# Patient Record
Sex: FEMALE | Race: WHITE | NOT HISPANIC OR LATINO | Employment: OTHER | URBAN - METROPOLITAN AREA
[De-identification: names, ages, dates, MRNs, and addresses within clinical notes are randomized per-mention and may not be internally consistent; named-entity substitution may affect disease eponyms.]

---

## 2020-02-22 ENCOUNTER — HOSPITAL ENCOUNTER (EMERGENCY)
Facility: HOSPITAL | Age: 85
Discharge: HOME | End: 2020-02-22
Attending: EMERGENCY MEDICINE | Admitting: EMERGENCY MEDICINE
Payer: MEDICARE

## 2020-02-22 ENCOUNTER — APPOINTMENT (EMERGENCY)
Dept: RADIOLOGY | Facility: HOSPITAL | Age: 85
End: 2020-02-22
Payer: MEDICARE

## 2020-02-22 VITALS
RESPIRATION RATE: 18 BRPM | DIASTOLIC BLOOD PRESSURE: 70 MMHG | BODY MASS INDEX: 28.25 KG/M2 | TEMPERATURE: 97.4 F | HEART RATE: 55 BPM | HEIGHT: 67 IN | WEIGHT: 180 LBS | OXYGEN SATURATION: 97 % | SYSTOLIC BLOOD PRESSURE: 154 MMHG

## 2020-02-22 DIAGNOSIS — T14.8XXA ABRASION: ICD-10-CM

## 2020-02-22 DIAGNOSIS — S02.31XA FRACTURE OF RIGHT ORBITAL FLOOR (HCC): Primary | ICD-10-CM

## 2020-02-22 LAB
ALBUMIN SERPL BCP-MCNC: 3.7 G/DL (ref 3.5–5)
ALP SERPL-CCNC: 97 U/L (ref 46–116)
ALT SERPL W P-5'-P-CCNC: 25 U/L (ref 12–78)
ANION GAP SERPL CALCULATED.3IONS-SCNC: 6 MMOL/L (ref 4–13)
AST SERPL W P-5'-P-CCNC: 25 U/L (ref 5–45)
ATRIAL RATE: 57 BPM
ATRIAL RATE: 61 BPM
BASOPHILS # BLD AUTO: 0.06 THOUSANDS/ΜL (ref 0–0.1)
BASOPHILS NFR BLD AUTO: 1 % (ref 0–1)
BILIRUB SERPL-MCNC: 0.4 MG/DL (ref 0.2–1)
BUN SERPL-MCNC: 28 MG/DL (ref 5–25)
CALCIUM SERPL-MCNC: 10.1 MG/DL (ref 8.3–10.1)
CHLORIDE SERPL-SCNC: 103 MMOL/L (ref 100–108)
CO2 SERPL-SCNC: 25 MMOL/L (ref 21–32)
CREAT SERPL-MCNC: 1.19 MG/DL (ref 0.6–1.3)
EOSINOPHIL # BLD AUTO: 0.39 THOUSAND/ΜL (ref 0–0.61)
EOSINOPHIL NFR BLD AUTO: 4 % (ref 0–6)
ERYTHROCYTE [DISTWIDTH] IN BLOOD BY AUTOMATED COUNT: 13.5 % (ref 11.6–15.1)
GFR SERPL CREATININE-BSD FRML MDRD: 39 ML/MIN/1.73SQ M
GLUCOSE SERPL-MCNC: 108 MG/DL (ref 65–140)
HCT VFR BLD AUTO: 41.7 % (ref 34.8–46.1)
HGB BLD-MCNC: 14 G/DL (ref 11.5–15.4)
IMM GRANULOCYTES # BLD AUTO: 0.04 THOUSAND/UL (ref 0–0.2)
IMM GRANULOCYTES NFR BLD AUTO: 0 % (ref 0–2)
INR PPP: 1.03 (ref 0.84–1.19)
LYMPHOCYTES # BLD AUTO: 1.63 THOUSANDS/ΜL (ref 0.6–4.47)
LYMPHOCYTES NFR BLD AUTO: 16 % (ref 14–44)
MCH RBC QN AUTO: 31.3 PG (ref 26.8–34.3)
MCHC RBC AUTO-ENTMCNC: 33.6 G/DL (ref 31.4–37.4)
MCV RBC AUTO: 93 FL (ref 82–98)
MONOCYTES # BLD AUTO: 0.71 THOUSAND/ΜL (ref 0.17–1.22)
MONOCYTES NFR BLD AUTO: 7 % (ref 4–12)
NEUTROPHILS # BLD AUTO: 7.17 THOUSANDS/ΜL (ref 1.85–7.62)
NEUTS SEG NFR BLD AUTO: 72 % (ref 43–75)
NRBC BLD AUTO-RTO: 0 /100 WBCS
P AXIS: 50 DEGREES
P AXIS: 51 DEGREES
PLATELET # BLD AUTO: 218 THOUSANDS/UL (ref 149–390)
PMV BLD AUTO: 9.6 FL (ref 8.9–12.7)
POTASSIUM SERPL-SCNC: 4.5 MMOL/L (ref 3.5–5.3)
PR INTERVAL: 180 MS
PR INTERVAL: 182 MS
PROT SERPL-MCNC: 7 G/DL (ref 6.4–8.2)
PROTHROMBIN TIME: 13.1 SECONDS (ref 11.6–14.5)
QRS AXIS: -33 DEGREES
QRS AXIS: -33 DEGREES
QRSD INTERVAL: 130 MS
QRSD INTERVAL: 136 MS
QT INTERVAL: 428 MS
QT INTERVAL: 456 MS
QTC INTERVAL: 430 MS
QTC INTERVAL: 443 MS
RBC # BLD AUTO: 4.47 MILLION/UL (ref 3.81–5.12)
SODIUM SERPL-SCNC: 134 MMOL/L (ref 136–145)
T WAVE AXIS: 52 DEGREES
T WAVE AXIS: 54 DEGREES
VENTRICULAR RATE: 57 BPM
VENTRICULAR RATE: 61 BPM
WBC # BLD AUTO: 10 THOUSAND/UL (ref 4.31–10.16)

## 2020-02-22 PROCEDURE — 85025 COMPLETE CBC W/AUTO DIFF WBC: CPT | Performed by: EMERGENCY MEDICINE

## 2020-02-22 PROCEDURE — 80053 COMPREHEN METABOLIC PANEL: CPT | Performed by: EMERGENCY MEDICINE

## 2020-02-22 PROCEDURE — 70450 CT HEAD/BRAIN W/O DYE: CPT

## 2020-02-22 PROCEDURE — 72125 CT NECK SPINE W/O DYE: CPT

## 2020-02-22 PROCEDURE — 90715 TDAP VACCINE 7 YRS/> IM: CPT | Performed by: EMERGENCY MEDICINE

## 2020-02-22 PROCEDURE — 90471 IMMUNIZATION ADMIN: CPT

## 2020-02-22 PROCEDURE — 99284 EMERGENCY DEPT VISIT MOD MDM: CPT | Performed by: SURGERY

## 2020-02-22 PROCEDURE — 70486 CT MAXILLOFACIAL W/O DYE: CPT

## 2020-02-22 PROCEDURE — 85610 PROTHROMBIN TIME: CPT | Performed by: EMERGENCY MEDICINE

## 2020-02-22 PROCEDURE — 36415 COLL VENOUS BLD VENIPUNCTURE: CPT | Performed by: EMERGENCY MEDICINE

## 2020-02-22 PROCEDURE — 99285 EMERGENCY DEPT VISIT HI MDM: CPT

## 2020-02-22 PROCEDURE — 99284 EMERGENCY DEPT VISIT MOD MDM: CPT | Performed by: EMERGENCY MEDICINE

## 2020-02-22 PROCEDURE — NC001 PR NO CHARGE: Performed by: EMERGENCY MEDICINE

## 2020-02-22 PROCEDURE — 93005 ELECTROCARDIOGRAM TRACING: CPT

## 2020-02-22 PROCEDURE — 93010 ELECTROCARDIOGRAM REPORT: CPT | Performed by: INTERNAL MEDICINE

## 2020-02-22 PROCEDURE — NC001 PR NO CHARGE: Performed by: SURGERY

## 2020-02-22 RX ORDER — METOPROLOL TARTRATE 50 MG/1
50 TABLET, FILM COATED ORAL EVERY 12 HOURS SCHEDULED
COMMUNITY
End: 2021-02-05

## 2020-02-22 RX ORDER — ACETAMINOPHEN 325 MG/1
975 TABLET ORAL ONCE
Status: COMPLETED | OUTPATIENT
Start: 2020-02-22 | End: 2020-02-22

## 2020-02-22 RX ORDER — HYDROCHLOROTHIAZIDE 12.5 MG/1
12.5 TABLET ORAL DAILY
COMMUNITY
End: 2021-01-22 | Stop reason: SDUPTHER

## 2020-02-22 RX ORDER — ASPIRIN 81 MG/1
81 TABLET, CHEWABLE ORAL DAILY
COMMUNITY

## 2020-02-22 RX ADMIN — TETANUS TOXOID, REDUCED DIPHTHERIA TOXOID AND ACELLULAR PERTUSSIS VACCINE, ADSORBED 0.5 ML: 5; 2.5; 8; 8; 2.5 SUSPENSION INTRAMUSCULAR at 11:35

## 2020-02-22 RX ADMIN — ACETAMINOPHEN 975 MG: 325 TABLET ORAL at 11:35

## 2020-02-22 NOTE — ED PROVIDER NOTES
H&P Exam - Trauma   Edwin Corado 80 y o  female MRN: [id number]  Unit/Bed#: ED 22/ED 22 Encounter: [id number]    Assessment/Plan   Trauma Alert: Trauma Acuity: C  Model of Arrival: Mode of Arrival: BLS via Trauma Squad Name and Number: Ruchi Rivas [id number] Team: Current Providers  Attending Provider: Trinidad Gasca MD  Resident: Adi Avelar MD  Resident: Lima Bowles MD  Registered Nurse: Kevin Moody RN  ED Technician: Darrin Dakin  Consultants: Trauma Team, Dr Bridget Jefferson Active Problems: Mechanical fall on ASA    Trauma Plan: CT head, neck and facial bones  Consult to trauma team    Chief Complaint:   Chief Complaint   Patient presents with    Fall     Patient fell while taking out garbage with head strike and 81 mg Aspirin  GCS 15, no LOC  Level C trauma       History of Present Illness   HPI:  Edwin Corado is a 80 y o  female who presents status post fall  Patient states that she was taking out the trash when she missed a step and caused her to fall forward  She struck her face  She denies any LOC  She had difficulty getting up and had to call for help  Her only complaint at this time is facial pain  Denies headache dizziness lightheadedness  No visual changes  No neck pain  No back pain  No chest pain shortness breath palpitations  No nausea no vomiting  No abdominal discomfort  Patient takes a daily aspirin  She denies other blood thinners     Mechanism:Details of Incident: Patient states she was taking out the garbage, tripped and fell  Struck right side of her face on cement  Denies LOC  HPI  Review of Systems   Positive Facial pain  Denies headache or dizziness   Denies visual changes  Denies chest pain  Denies shortness of breath  Denies abdominal pain  No nausea no vomiting  No focal numbness weakness or tingling      Historical Information     Immunizations:   Immunization History   Administered Date(s) Administered    Tdap 02/22/2020 Past Medical History:   Diagnosis Date    Hypertension      History reviewed  No pertinent family history  Past Surgical History:   Procedure Laterality Date    BREAST SURGERY       Social History     Socioeconomic History    Marital status:      Spouse name: None    Number of children: None    Years of education: None    Highest education level: None   Occupational History    None   Social Needs    Financial resource strain: None    Food insecurity:     Worry: None     Inability: None    Transportation needs:     Medical: None     Non-medical: None   Tobacco Use    Smoking status: Never Smoker    Smokeless tobacco: Never Used   Substance and Sexual Activity    Alcohol use: Not Currently    Drug use: Never    Sexual activity: None   Lifestyle    Physical activity:     Days per week: None     Minutes per session: None    Stress: None   Relationships    Social connections:     Talks on phone: None     Gets together: None     Attends Zoroastrian service: None     Active member of club or organization: None     Attends meetings of clubs or organizations: None     Relationship status: None    Intimate partner violence:     Fear of current or ex partner: None     Emotionally abused: None     Physically abused: None     Forced sexual activity: None   Other Topics Concern    None   Social History Narrative    None       Family History: noncontribuatory    Meds/Allergies   Prior to Admission Medications   Prescriptions Last Dose Informant Patient Reported?  Taking?   aspirin 81 mg chewable tablet 2/21/2020 at Unknown time  Yes Yes   Sig: Chew 81 mg daily   hydrochlorothiazide (HYDRODIURIL) 12 5 mg tablet 2/21/2020 at Unknown time  Yes Yes   Sig: Take 12 5 mg by mouth daily   metoprolol tartrate (LOPRESSOR) 50 mg tablet 2/22/2020 at Unknown time  Yes Yes   Sig: Take 50 mg by mouth every 12 (twelve) hours      Facility-Administered Medications: None       No Known Allergies    PHYSICAL EXAM    On normocephalic atraumatic  Pupils equal round reactive to light  Extraocular movements intact bilaterally  Ecchymosis to right periorbital region into the maxillary region  Abrasions to nose  Intact mid face  No active epistaxis  No hemotympanum  Neck is supple full range of motion  No midline tenderness to palpation over CT or L-spine  No step-off no deformity  Heart is regular rate  Lungs are clear to auscultation bilaterally  Abdomen is soft positive bowel sounds no rebound or guarding  No lower extremity edema or calf tenderness palpation  Patient does have abrasions over right ulnar aspect of hand as well as bilateral knees  Full range of motion no tenderness to palpation  No swelling  Intact distal pulses  Awake alert oriented appropriate      Objective   Vitals:   First set: Temperature: (!) 97 4 °F (36 3 °C) (02/22/20 1034)  Pulse: 60 (02/22/20 1034)  Respirations: 18 (02/22/20 1034)  Blood Pressure: (!) 185/89 (02/22/20 1034)  SpO2: 97 % (02/22/20 1034)    Primary Survey:   (A) Airway:  Intact  (B) Breathing:  A vicente normal  (C) Circulation: Pulses:   +2 throughout  (D) Disabliity:  GCS 15  (E) Expose:  Ecchymosis to face and abrasions to right hand and bilateral knees    Secondary Survey: (Click on Physical Exam tab above)  Physical Exam    Invasive Devices     Peripheral Intravenous Line            Peripheral IV 02/22/20 Right Forearm less than 1 day                Lab Results:   Results Reviewed     Procedure Component Value Units Date/Time    Comprehensive metabolic panel [[id number]]  (Abnormal) Collected:  02/22/20 1133    Lab Status:  Final result Specimen:  Blood from Arm, Right Updated:  02/22/20 1203     Sodium 134 mmol/L      Potassium 4 5 mmol/L      Chloride 103 mmol/L      CO2 25 mmol/L      ANION GAP 6 mmol/L      BUN 28 mg/dL      Creatinine 1 19 mg/dL      Glucose 108 mg/dL      Calcium 10 1 mg/dL      AST 25 U/L      ALT 25 U/L      Alkaline Phosphatase 97 U/L Total Protein 7 0 g/dL      Albumin 3 7 g/dL      Total Bilirubin 0 40 mg/dL      eGFR 39 ml/min/1 73sq m     Narrative:       National Kid[employer] guidelines for Chronic Kidney Disease (CKD):     Stage 1 with normal or high GFR (GFR > 90 mL/min/1 73 square meters)    Stage 2 Mild CKD (GFR = 60-89 mL/min/1 73 square meters)    Stage 3A Moderate CKD (GFR = 45-59 mL/min/1 73 square meters)    Stage 3B Moderate CKD (GFR = 30-44 mL/min/1 73 square meters)    Stage 4 Severe CKD (GFR = 15-29 mL/min/1 73 square meters)    Stage 5 End Stage CKD (GFR <15 mL/min/1 73 square meters)  Note: GFR calculation is accurate only with a steady state creatinine    Protime-INR [[id number]]  (Normal) Collected:  02/22/20 1133    Lab Status:  Final result Specimen:  Blood from Arm, Right Updated:  02/22/20 1158     Protime 13 1 seconds      INR 1 03    CBC and differential [[id number]] Collected:  02/22/20 1133    Lab Status:  Final result Specimen:  Blood from Arm, Right Updated:  02/22/20 1144     WBC 10 00 Thousand/uL      RBC 4 47 Million/uL      Hemoglobin 14 0 g/dL      Hematocrit 41 7 %      MCV 93 fL      MCH 31 3 pg      MCHC 33 6 g/dL      RDW 13 5 %      MPV 9 6 fL      Platelets 427 Thousands/uL      nRBC 0 /100 WBCs      Neutrophils Relative 72 %      Immat GRANS % 0 %      Lymphocytes Relative 16 %      Monocytes Relative 7 %      Eosinophils Relative 4 %      Basophils Relative 1 %      Neutrophils Absolute 7 17 Thousands/µL      Immature Grans Absolute 0 04 Thousand/uL      Lymphocytes Absolute 1 63 Thousands/µL      Monocytes Absolute 0 71 Thousand/µL      Eosinophils Absolute 0 39 Thousand/µL      Basophils Absolute 0 06 Thousands/µL                  Imaging Studies:   Direct to CT: Yes  CT head wo contrast   Final Result by Sandra Reinoso MD (02/22 1116)      No acute intracranial abnormality          Right inferior orbital wall fracture with periorbital soft tissue swelling and blood products within the right maxillary sinus  Please see report for dedicated facial bone CT  I personally discussed this study with Yahaira Lucas on 2/22/2020 at 11:16 AM                      Workstation performed: [id number]         CT facial bones without contrast   Final Result by Jaclyn Ortega MD (02/22 1116)      Right inferior orbital wall fracture with displacement of fracture fragments into the maxillary sinus  No herniation of extraocular muscle through fracture defect or retrobulbar hematoma  Periorbital soft tissue swelling and blood products within the    right maxillary sinus also present  I personally discussed this study with Yahaira Lucas on 2/22/2020 at 11:16 AM                         Workstation performed: [id number]         CT spine cervical without contrast   Final Result by Jaclyn Ortega MD (02/22 1117)      No cervical spine fracture or traumatic malalignment  Incidental thyroid nodule(s) for which nonemergent thyroid ultrasound is recommended  I personally discussed this study with Kimberley Godoy on 2/22/2020 at 11:16 AM                       Workstation performed: [id number]             Other Studies:  EKG LAD with LVH    Code Status: No Order  Advance Directive and Living Will:      Power of :    POLST:      Procedures  Procedures         ED Course    please refer to at the station note  MDM      Disposition  [facility] Transfer: No  Final diagnoses:   Fracture of right orbital floor [facility])   Abrasion     Time reflects when diagnosis was documented in both MDM as applicable and the Disposition within this note     Time User Action Codes Description Comment    2/22/2020  3:16 PM Dakota Tolbert [S02 31XA] Fracture of right orbital floor ([address] )     2/22/2020  3:16 PM Dakota Tolbert  [address] Abrasion       ED Disposition     ED Disposition Condition Date/Time Comment    Discharge Stable Sat Feb 22, 2020 3:15 PM Go Mendez discharge to home/self care  Follow-up Information     Follow up With Specialties Details Why Contact Info Additional Information    Rj Rahman, BHAKTI Oral Maxillofacial Surgery Schedule an appointment as soon as possible for a visit   [address] [facility], [place] 16       Chuyita Lind MD [address] [facility] [phone]       [facility] [address] Emergency Department Emergency Medicine   [address]  [phone]  ED, [address] [place] [address], [address]   [phone]        Patient's Medications   Discharge Prescriptions    No medications on file     No discharge procedures on file      PDMP Review     None          ED Provider  Electronically Signed by         Pete Hagan MD  02/28/20 0500

## 2020-02-22 NOTE — ED PROVIDER NOTES
History  Chief Complaint   Patient presents with    Fall     Patient fell while taking out garbage with head strike and 81 mg Aspirin  GCS 15, no LOC  Level C trauma     Patient presents for evaluation of fall  Patient tells me that she when out this morning and was taking out her trash and in doing so she tripped over step falling forward and struck her right face against the concrete ground  She had the immediate onset of pain on the right side of her face as well as a fair amount of bleeding from her nose  Denies any loss of consciousness, headache, change in vision, nausea/vomiting  Patient takes a baby aspirin, no other anti-platelet or anti coagulants  Patient was not able to get up on her own and contacted EMS who brought her into the hospital   Patient also complains of some pain in her right palm where she believes she tried to catch herself during her fall  Prior to Admission Medications   Prescriptions Last Dose Informant Patient Reported? Taking?   aspirin 81 mg chewable tablet 2/21/2020 at Unknown time  Yes Yes   Sig: Chew 81 mg daily   hydrochlorothiazide (HYDRODIURIL) 12 5 mg tablet 2/21/2020 at Unknown time  Yes Yes   Sig: Take 12 5 mg by mouth daily   metoprolol tartrate (LOPRESSOR) 50 mg tablet 2/22/2020 at Unknown time  Yes Yes   Sig: Take 50 mg by mouth every 12 (twelve) hours      Facility-Administered Medications: None       Past Medical History:   Diagnosis Date    Hypertension        Past Surgical History:   Procedure Laterality Date    BREAST SURGERY         History reviewed  No pertinent family history  I have reviewed and agree with the history as documented  Social History     Tobacco Use    Smoking status: Never Smoker    Smokeless tobacco: Never Used   Substance Use Topics    Alcohol use: Not Currently    Drug use: Never        Review of Systems   Constitutional: Negative for chills, diaphoresis, fatigue and fever     Respiratory: Negative for cough and shortness and no friction rub  No murmur heard  Pulmonary/Chest: Effort normal and breath sounds normal  No respiratory distress  She has no wheezes  She has no rales  Abdominal: Soft  Bowel sounds are normal  She exhibits no distension and no mass  There is no tenderness  There is no guarding  Musculoskeletal: Normal range of motion  She exhibits tenderness  She exhibits no deformity  Hands:       Legs:  Neurological: She is alert and oriented to person, place, and time  No cranial nerve deficit or sensory deficit  She exhibits normal muscle tone  Coordination normal    Skin: Skin is warm  She is not diaphoretic  Psychiatric: She has a normal mood and affect  Her behavior is normal  Judgment and thought content normal    Vitals reviewed        ED Medications  Medications   acetaminophen (TYLENOL) tablet 975 mg (975 mg Oral Given 2/22/20 1135)   tetanus-diphtheria-acellular pertussis (BOOSTRIX) IM injection 0 5 mL (0 5 mL Intramuscular Given 2/22/20 1135)       Diagnostic Studies  Results Reviewed     Procedure Component Value Units Date/Time    Comprehensive metabolic panel [[id number]]  (Abnormal) Collected:  02/22/20 1133    Lab Status:  Final result Specimen:  Blood from Arm, Right Updated:  02/22/20 1203     Sodium 134 mmol/L      Potassium 4 5 mmol/L      Chloride 103 mmol/L      CO2 25 mmol/L      ANION GAP 6 mmol/L      BUN 28 mg/dL      Creatinine 1 19 mg/dL      Glucose 108 mg/dL      Calcium 10 1 mg/dL      AST 25 U/L      ALT 25 U/L      Alkaline Phosphatase 97 U/L      Total Protein 7 0 g/dL      Albumin 3 7 g/dL      Total Bilirubin 0 40 mg/dL      eGFR 39 ml/min/1 73sq m     Narrative:       [employer] guidelines for Chronic Kidney Disease (CKD):     Stage 1 with normal or high GFR (GFR > 90 mL/min/1 73 square meters)    Stage 2 Mild CKD (GFR = 60-89 mL/min/1 73 square meters)    Stage 3A Moderate CKD (GFR = 45-59 mL/min/1 73 square meters)    Stage 3B Moderate CKD (GFR = 30-44 mL/min/1 73 square meters)    Stage 4 Severe CKD (GFR = 15-29 mL/min/1 73 square meters)    Stage 5 End Stage CKD (GFR <15 mL/min/1 73 square meters)  Note: GFR calculation is accurate only with a steady state creatinine    Protime-INR [[id number]]  (Normal) Collected:  02/22/20 1133    Lab Status:  Final result Specimen:  Blood from Arm, Right Updated:  02/22/20 1158     Protime 13 1 seconds      INR 1 03    CBC and differential [[id number]] Collected:  02/22/20 1133    Lab Status:  Final result Specimen:  Blood from Arm, Right Updated:  02/22/20 1144     WBC 10 00 Thousand/uL      RBC 4 47 Million/uL      Hemoglobin 14 0 g/dL      Hematocrit 41 7 %      MCV 93 fL      MCH 31 3 pg      MCHC 33 6 g/dL      RDW 13 5 %      MPV 9 6 fL      Platelets 408 Thousands/uL      nRBC 0 /100 WBCs      Neutrophils Relative 72 %      Immat GRANS % 0 %      Lymphocytes Relative 16 %      Monocytes Relative 7 %      Eosinophils Relative 4 %      Basophils Relative 1 %      Neutrophils Absolute 7 17 Thousands/µL      Immature Grans Absolute 0 04 Thousand/uL      Lymphocytes Absolute 1 63 Thousands/µL      Monocytes Absolute 0 71 Thousand/µL      Eosinophils Absolute 0 39 Thousand/µL      Basophils Absolute 0 06 Thousands/µL                  CT head wo contrast   Final Result by Pramod Bell MD (02/22 1116)      No acute intracranial abnormality  Right inferior orbital wall fracture with periorbital soft tissue swelling and blood products within the right maxillary sinus  Please see report for dedicated facial bone CT  I personally discussed this study with Moustapha Ordonez on 2/22/2020 at 11:16 AM                      Workstation performed: [id number]         CT facial bones without contrast   Final Result by Pramod Bell MD (02/22 1116)      Right inferior orbital wall fracture with displacement of fracture fragments into the maxillary sinus    No herniation of extraocular muscle through fracture defect or retrobulbar hematoma  Periorbital soft tissue swelling and blood products within the    right maxillary sinus also present  I personally discussed this study with Andrea Kate on 2/22/2020 at 11:16 AM                         Workstation performed: [id number]         CT spine cervical without contrast   Final Result by Neto Vidal MD (02/22 1117)      No cervical spine fracture or traumatic malalignment  Incidental thyroid nodule(s) for which nonemergent thyroid ultrasound is recommended  I personally discussed this study with Monae Becker on 2/22/2020 at 11:16 AM                       Workstation performed: [id number]               Procedures  Procedures      ED Course           Identification of Seniors at Risk      Most Recent Value   (ISAR) Identification of Seniors at Risk   Before the illness or injury that brought you to the Emergency, did you need someone to help you on a regular basis? 0 Filed at: 02/22/2020 1048   In the last 24 hours, have you needed more help than usual?  0 Filed at: 02/22/2020 1048   Have you been hospitalized for one or more nights during the past 6 months? 0 Filed at: 02/22/2020 1048   In general, do you see well?  0 Filed at: 02/22/2020 1048   In general, do you have serious problems with your memory? 0 Filed at: 02/22/2020 1048   Do you take more than three different medications every day? 1 Filed at: 02/22/2020 1048   ISAR Score  1 Filed at: 02/22/2020 1048                          MDM  Number of Diagnoses or Management Options  Abrasion:   Fracture of right orbital floor [facility]):   Diagnosis management comments: Rate: 57, sinus rhythm, left axis  Normal RI interval, 136 QRS, QT interval within normal range  No ST elevations or depressions to indicate ischemia  No diffuse elevations to indicate pericarditis  No biphasic T waves in precordial leads to indicate Wellens       CT of facial bones showed a inferior orbit fracture on the right side  Trauma was consulted who evaluated patient, contacted [facility]  It was decided to have the patient discharged home to self-care and to follow-up with OMFS as soon as possible  No antibiotic treatment  I discussed with the patient return precautions including severe head pain, eye pain, change in vision, loss of balance, weakness, difficulty swallowing or breathing  Disposition  Final diagnoses:   Fracture of right orbital floor [facility])   Abrasion     Time reflects when diagnosis was documented in both MDM as applicable and the Disposition within this note     Time User Action Codes Description Comment    2/22/2020  3:16 PM José Miguel Tolbert [S02 31XA] Fracture of right orbital floor ([address] )     2/22/2020  3:16 PM José Miguel Tolbert Darwin Membreno  [address] Abrasion       ED Disposition     ED Disposition Condition Date/Time Comment    Discharge Stable Sat Feb 22, 2020  3:15 PM Fabby Christianson discharge to home/self care  Follow-up Information     Follow up With Specialties Details Why Contact Info Additional Information    Bailey Vidal DMD Oral Maxillofacial Surgery Schedule an appointment as soon as possible for a visit   [address] [place] [address]       Marilyn Spence MD Internal [address] [place] [address] [phone]       [address] [place] [address] Emergency Department Emergency Medicine   [address]  [phone]  ED, [address] [place] [address], [address]   [phone]          Patient's Medications   Discharge Prescriptions    No medications on file     No discharge procedures on file  ED Provider  Attending physically available and evaluated Fabby Christianson  I managed the patient along with the ED Attending      Electronically Signed by         Taylor Cabrera MD  02/22/20 2220

## 2020-02-22 NOTE — H&P
H&P Exam - Trauma   Tripp Simpson 80 y o  female MRN: [id number]  Unit/Bed#: ED 22 Encounter: [id number]    Assessment/Plan   Trauma Alert: Evaluation and Other level c  Model of Arrival: Ambulance  Trauma Team: Attending Alexx Farooq, Residents isidoro and Fellow Petit-Me  Consultants: Oral Maxillofacial:   Time Called   now and Ophthalmology:  Time Called   now    Trauma Active Problems:   Mechanical fall  Right orbital floor fracture    Trauma Plan: ***    Chief Complaint: ***    History of Present Illness   HPI:  Tripp Simpson is a 80 y o  female who presents with ***  Mechanism:{Mechanism of injury:99459}    Review of Systems    12-point, complete review of systems was reviewed and negative except as stated above  Historical Information   History is unobtainable from the patient due to ***  Efforts to obtain history included the following sources: {Reason history is unobtainable:27263}    Past Medical History:   Diagnosis Date    Hypertension      Past Surgical History:   Procedure Laterality Date    BREAST SURGERY       Social History   Social History     Substance and Sexual Activity   Alcohol Use Not Currently     Social History     Substance and Sexual Activity   Drug Use Never     Social History     Tobacco Use   Smoking Status Never Smoker   Smokeless Tobacco Never Used     There is no immunization history for the selected administration types on file for this patient    Last Tetanus: ***  Family History: Non-contributory  Unable to obtain/limited by ***      Meds/Allergies   { IP EMDP:074195796}    No Known Allergies      PHYSICAL EXAM    PE limited by: ***    Objective   Vitals:   First set: Temperature: (!) 97 4 °F (36 3 °C) (02/22/20 1034)  Pulse: 60 (02/22/20 1034)  Respirations: 18 (02/22/20 1034)  Blood Pressure: (!) 185/89 (02/22/20 1034)    Primary Survey:   (A) Airway: ***  (B) Breathing: ***  (C) Circulation: Pulses:   {Pulses:32342}  (D) Disabliity:  {GCS response:81118}  (E) Expose: {Completed:50777}    Secondary Survey: (Click on Physical Exam tab above)  Physical Exam    Invasive Devices     None                 Lab Results: {Laboratory AHNB:07531}  Imaging/EKG Studies: {Radiographic HLNV:46684}  Other Studies: ***    Code Status: No Order  Advance Directive and Living Will:      Power of :    POLST:

## 2020-02-22 NOTE — ED ATTENDING ATTESTATION
2/22/2020  I, Trinidad Gasca MD, saw and evaluated the patient  I have discussed the patient with the resident/non-physician practitioner and agree with the resident's/non-physician practitioner's findings, Plan of Care, and MDM as documented in the resident's/non-physician practitioner's note, except where noted  All available labs and Radiology studies were reviewed  I was present for key portions of any procedure(s) performed by the resident/non-physician practitioner and I was immediately available to provide assistance  At this point I agree with the current assessment done in the Emergency Department  I have conducted an independent evaluation of this patient a history and physical is as follows:    OA:  This is evaluation of a 80year-old female who presents emergency department as a trauma will see  She was seen by resident and diamond as myself  She states then walking out to throughout the trash can earlier today, she turned to walk back and House missed her footing on a step that she did not see causing her to fall face forward  She tried to catch herself by placing her hand down on the ground but she struck her head and face  She denies any LOC  She did have difficulty getting him up off the ground and was assisted by family members who were at the home  She currently denies any headache dizziness lightheadedness  No visual changes  No neck or back pain  No preceding or current chest pain, pressure, palpitations, shortness of breath abdominal pain nausea vomiting or focal numbness weakness or tingling  On physical exam patient has swelling and ecchymosis to her right periorbital region and maxillary region  Extraocular muscles intact  Pupils equal round reactive to light  There is no hemotympanum  There is no act all epistaxis  She does have abrasions to her nose  She has intact mid face  Neck is supple full range of motion no midline tenderness palpation no step-off    Heart is regular rate  Lungs are clear  Nontender to palpation over chest as well as hips  Abdomen is soft nontender nondistended positive bowel sounds no rebound or guarding  Patient has small abrasions to knees but no discomfort with range of motion  Intact distal pulses capillary refill less than 2 seconds no edema  Awake alert oriented appropriate  Assessment and plan CT head neck and facial bones  After initial imaging was performed patient found to have orbital floor fracture  Discussion was traumas in the emergency department decision made to obtain basic blood work as well as EKG in anticipation of admission  After trauma evaluated the patient, Dr Mateus Andrade noted okay for dc, does not require admission and can be followed with OMFS as an outpatient  ED Course    1520  Lengthy conversation with the patient and her family  She feels well and wishes for dc  She has never had any chest pain/pressure/palpiations/SOB/lightheadedness or dizziness in associated with the fall or subsequently  EKG obtained given initial concern for admission to trauma  LVH with LAD  No previous however ercords obtained form [facility] and noted to have similar reading (no tracing sent)  This was discussed with pt and family  They are comfortable without additional testing/blood work and wish for dc  They understand strict return and f/u precautions given orbital floor fracture  Seen and evaluated by traumaamol with dc and per discussion with trauma team to FS, no abx necessary at this time  Pt will have family staying with her  Ambulatory in the ED, eating and in NAD         Critical Care Time  Procedures

## 2020-07-17 ENCOUNTER — APPOINTMENT (OUTPATIENT)
Dept: LAB | Facility: HOSPITAL | Age: 85
End: 2020-07-17
Payer: MEDICARE

## 2020-07-17 ENCOUNTER — TRANSCRIBE ORDERS (OUTPATIENT)
Dept: ADMINISTRATIVE | Facility: HOSPITAL | Age: 85
End: 2020-07-17

## 2020-07-17 DIAGNOSIS — Z79.899 ENCOUNTER FOR LONG-TERM (CURRENT) USE OF OTHER MEDICATIONS: ICD-10-CM

## 2020-07-17 DIAGNOSIS — I10 ESSENTIAL HYPERTENSION, BENIGN: Primary | ICD-10-CM

## 2020-07-17 DIAGNOSIS — M81.0 SENILE OSTEOPOROSIS: ICD-10-CM

## 2020-07-17 DIAGNOSIS — E55.9 VITAMIN D DEFICIENCY: ICD-10-CM

## 2020-07-17 DIAGNOSIS — I10 ESSENTIAL HYPERTENSION, BENIGN: ICD-10-CM

## 2020-07-17 LAB
25(OH)D3 SERPL-MCNC: 55.4 NG/ML (ref 30–100)
ALBUMIN SERPL BCP-MCNC: 4.3 G/DL (ref 3.4–4.8)
ALP SERPL-CCNC: 62.9 U/L (ref 35–140)
ALT SERPL W P-5'-P-CCNC: 16 U/L (ref 5–54)
ANION GAP SERPL CALCULATED.3IONS-SCNC: 10 MMOL/L (ref 4–13)
AST SERPL W P-5'-P-CCNC: 22 U/L (ref 15–41)
BASOPHILS # BLD AUTO: 0.06 THOUSANDS/ΜL (ref 0–0.1)
BASOPHILS NFR BLD AUTO: 1 % (ref 0–1)
BILIRUB SERPL-MCNC: 0.79 MG/DL (ref 0.3–1.2)
BUN SERPL-MCNC: 20 MG/DL (ref 6–20)
CALCIUM ALBUM COR SERPL-MCNC: 10.1 MG/DL (ref 8.3–10.1)
CALCIUM SERPL-MCNC: 10.3 MG/DL (ref 8.4–10.2)
CHLORIDE SERPL-SCNC: 98 MMOL/L (ref 96–108)
CO2 SERPL-SCNC: 26 MMOL/L (ref 22–33)
CREAT SERPL-MCNC: 1.24 MG/DL (ref 0.4–1.1)
EOSINOPHIL # BLD AUTO: 0.37 THOUSAND/ΜL (ref 0–0.61)
EOSINOPHIL NFR BLD AUTO: 4 % (ref 0–6)
ERYTHROCYTE [DISTWIDTH] IN BLOOD BY AUTOMATED COUNT: 13.6 % (ref 11.6–15.1)
GFR SERPL CREATININE-BSD FRML MDRD: 37 ML/MIN/1.73SQ M
GLUCOSE P FAST SERPL-MCNC: 99 MG/DL (ref 65–99)
HCT VFR BLD AUTO: 42 % (ref 34.8–46.1)
HGB BLD-MCNC: 14.6 G/DL (ref 11.5–15.4)
IMM GRANULOCYTES # BLD AUTO: 0.02 THOUSAND/UL (ref 0–0.2)
IMM GRANULOCYTES NFR BLD AUTO: 0 % (ref 0–2)
LYMPHOCYTES # BLD AUTO: 2.13 THOUSANDS/ΜL (ref 0.6–4.47)
LYMPHOCYTES NFR BLD AUTO: 25 % (ref 14–44)
MCH RBC QN AUTO: 30.7 PG (ref 26.8–34.3)
MCHC RBC AUTO-ENTMCNC: 34.8 G/DL (ref 31.4–37.4)
MCV RBC AUTO: 88 FL (ref 82–98)
MONOCYTES # BLD AUTO: 0.7 THOUSAND/ΜL (ref 0.17–1.22)
MONOCYTES NFR BLD AUTO: 8 % (ref 4–12)
NEUTROPHILS # BLD AUTO: 5.35 THOUSANDS/ΜL (ref 1.85–7.62)
NEUTS SEG NFR BLD AUTO: 62 % (ref 43–75)
PLATELET # BLD AUTO: 272 THOUSANDS/UL (ref 149–390)
PMV BLD AUTO: 9.6 FL (ref 8.9–12.7)
POTASSIUM SERPL-SCNC: 4 MMOL/L (ref 3.5–5)
PROT SERPL-MCNC: 6.7 G/DL (ref 6.4–8.3)
RBC # BLD AUTO: 4.75 MILLION/UL (ref 3.81–5.12)
SODIUM SERPL-SCNC: 134 MMOL/L (ref 133–145)
WBC # BLD AUTO: 8.63 THOUSAND/UL (ref 4.31–10.16)

## 2020-07-17 PROCEDURE — 36415 COLL VENOUS BLD VENIPUNCTURE: CPT

## 2020-07-17 PROCEDURE — 80053 COMPREHEN METABOLIC PANEL: CPT

## 2020-07-17 PROCEDURE — 82306 VITAMIN D 25 HYDROXY: CPT

## 2020-07-17 PROCEDURE — 85025 COMPLETE CBC W/AUTO DIFF WBC: CPT

## 2020-08-19 ENCOUNTER — TRANSCRIBE ORDERS (OUTPATIENT)
Dept: ADMINISTRATIVE | Facility: HOSPITAL | Age: 85
End: 2020-08-19

## 2020-08-19 DIAGNOSIS — Z12.31 ENCOUNTER FOR SCREENING MAMMOGRAM FOR MALIGNANT NEOPLASM OF BREAST: Primary | ICD-10-CM

## 2021-01-16 ENCOUNTER — TRANSCRIBE ORDERS (OUTPATIENT)
Dept: LAB | Facility: HOSPITAL | Age: 86
End: 2021-01-16

## 2021-01-16 ENCOUNTER — APPOINTMENT (OUTPATIENT)
Dept: LAB | Facility: HOSPITAL | Age: 86
End: 2021-01-16
Attending: INTERNAL MEDICINE
Payer: MEDICARE

## 2021-01-16 DIAGNOSIS — E83.52 HYPERCALCEMIA: ICD-10-CM

## 2021-01-16 DIAGNOSIS — E83.52 HYPERCALCEMIA: Primary | ICD-10-CM

## 2021-01-16 LAB
ALBUMIN SERPL BCP-MCNC: 4.3 G/DL (ref 3.4–4.8)
ALP SERPL-CCNC: 75.2 U/L (ref 35–140)
ALT SERPL W P-5'-P-CCNC: 16 U/L (ref 5–54)
ANION GAP SERPL CALCULATED.3IONS-SCNC: 7 MMOL/L (ref 4–13)
AST SERPL W P-5'-P-CCNC: 23 U/L (ref 15–41)
BILIRUB SERPL-MCNC: 0.61 MG/DL (ref 0.3–1.2)
BUN SERPL-MCNC: 25 MG/DL (ref 6–20)
CALCIUM SERPL-MCNC: 10.1 MG/DL (ref 8.4–10.2)
CHLORIDE SERPL-SCNC: 102 MMOL/L (ref 96–108)
CO2 SERPL-SCNC: 30 MMOL/L (ref 22–33)
CREAT SERPL-MCNC: 1.25 MG/DL (ref 0.4–1.1)
GFR SERPL CREATININE-BSD FRML MDRD: 37 ML/MIN/1.73SQ M
GLUCOSE P FAST SERPL-MCNC: 96 MG/DL (ref 70–105)
POTASSIUM SERPL-SCNC: 5.1 MMOL/L (ref 3.5–5)
PROT SERPL-MCNC: 6.7 G/DL (ref 6.4–8.3)
SODIUM SERPL-SCNC: 139 MMOL/L (ref 133–145)

## 2021-01-16 PROCEDURE — 36415 COLL VENOUS BLD VENIPUNCTURE: CPT

## 2021-01-16 PROCEDURE — 80053 COMPREHEN METABOLIC PANEL: CPT

## 2021-01-22 ENCOUNTER — OFFICE VISIT (OUTPATIENT)
Dept: INTERNAL MEDICINE CLINIC | Facility: CLINIC | Age: 86
End: 2021-01-22
Payer: MEDICARE

## 2021-01-22 VITALS
BODY MASS INDEX: 28.19 KG/M2 | TEMPERATURE: 98.2 F | SYSTOLIC BLOOD PRESSURE: 128 MMHG | HEART RATE: 70 BPM | DIASTOLIC BLOOD PRESSURE: 74 MMHG | OXYGEN SATURATION: 98 % | HEIGHT: 67 IN

## 2021-01-22 DIAGNOSIS — M17.0 PRIMARY OSTEOARTHRITIS OF BOTH KNEES: Chronic | ICD-10-CM

## 2021-01-22 DIAGNOSIS — I10 ESSENTIAL HYPERTENSION: Primary | ICD-10-CM

## 2021-01-22 DIAGNOSIS — E83.52 HYPERCALCEMIA: Chronic | ICD-10-CM

## 2021-01-22 DIAGNOSIS — N18.32 STAGE 3B CHRONIC KIDNEY DISEASE (HCC): Chronic | ICD-10-CM

## 2021-01-22 DIAGNOSIS — R60.0 EDEMA OF BOTH LOWER LEGS: Chronic | ICD-10-CM

## 2021-01-22 DIAGNOSIS — H35.30 MACULAR DEGENERATION OF BOTH EYES, UNSPECIFIED TYPE: Chronic | ICD-10-CM

## 2021-01-22 PROBLEM — N18.9 CKD (CHRONIC KIDNEY DISEASE): Chronic | Status: ACTIVE | Noted: 2021-01-22

## 2021-01-22 PROBLEM — E87.5 HYPERKALEMIA: Status: ACTIVE | Noted: 2021-01-22

## 2021-01-22 PROBLEM — M81.0 AGE RELATED OSTEOPOROSIS: Chronic | Status: ACTIVE | Noted: 2021-01-22

## 2021-01-22 PROBLEM — M19.91 PRIMARY OSTEOARTHRITIS: Chronic | Status: ACTIVE | Noted: 2021-01-22

## 2021-01-22 PROCEDURE — 99214 OFFICE O/P EST MOD 30 MIN: CPT | Performed by: INTERNAL MEDICINE

## 2021-01-22 RX ORDER — ZINC GLUCONATE 50 MG
50 TABLET ORAL DAILY
COMMUNITY

## 2021-01-22 RX ORDER — CALCIUM ACETATE 667 MG/1
667 CAPSULE ORAL
COMMUNITY
End: 2021-01-22 | Stop reason: CLARIF

## 2021-01-22 RX ORDER — ACETAMINOPHEN 500 MG
500 TABLET ORAL 2 TIMES DAILY PRN
COMMUNITY
End: 2022-01-21 | Stop reason: ALTCHOICE

## 2021-01-22 RX ORDER — OMEGA-3-ACID ETHYL ESTERS 1 G/1
2 CAPSULE, LIQUID FILLED ORAL 2 TIMES DAILY
COMMUNITY

## 2021-01-22 RX ORDER — TAMOXIFEN CITRATE 10 MG/1
TABLET ORAL
COMMUNITY
End: 2021-01-22 | Stop reason: ALTCHOICE

## 2021-01-22 RX ORDER — MULTIVIT-MIN/IRON FUM/FOLIC AC 7.5 MG-4
1 TABLET ORAL DAILY
COMMUNITY

## 2021-01-22 RX ORDER — METOPROLOL SUCCINATE 25 MG/1
TABLET, EXTENDED RELEASE ORAL
COMMUNITY
End: 2021-04-23 | Stop reason: SDUPTHER

## 2021-01-22 RX ORDER — HYDROCHLOROTHIAZIDE 12.5 MG/1
12.5 TABLET ORAL DAILY
Qty: 90 TABLET | Refills: 1 | Status: SHIPPED | OUTPATIENT
Start: 2021-01-22 | End: 2021-04-23 | Stop reason: SDUPTHER

## 2021-01-22 RX ORDER — PHENOL 1.4 %
600 AEROSOL, SPRAY (ML) MUCOUS MEMBRANE 2 TIMES DAILY WITH MEALS
COMMUNITY

## 2021-01-22 NOTE — ASSESSMENT & PLAN NOTE
Better  Still has slightly  around ankles area  Most likely dependent and venous insufficiency  Patient does not like to wear elastic stockings    Advised to keep legs elevated

## 2021-01-22 NOTE — ASSESSMENT & PLAN NOTE
Potassium 5 1  Could be secondary to dehydration versus increase potassium intake  Discussed with patient low-potassium diet  Advised to increase fluids

## 2021-01-22 NOTE — PATIENT INSTRUCTIONS
Patient advised to continue present medications discussed with the patient medications and laboratory data in detail  Follow-up with me in 3 months    If any blood test was ordered please do 1 week prior to next appointment  If you have any questions please call the office [phone]

## 2021-01-23 NOTE — PROGRESS NOTES
Assessment/Plan:         Problem List Items Addressed This Visit        Cardiovascular and Mediastinum    Essential hypertension - Primary (Chronic)     BP contolled on meds  And diet;         Relevant Medications    metoprolol succinate (Toprol XL) 25 mg 24 hr tablet    hydrochlorothiazide (HYDRODIURIL) 12 5 mg tablet       Musculoskeletal and Integument    Primary osteoarthritis (Chronic)     See takes p r n  Acetaminophen  Genitourinary    CKD (chronic kidney disease) (Chronic)     Lab Results   Component Value Date    EGFR 37 01/16/2021    EGFR 37 07/17/2020    EGFR 39 02/22/2020    CREATININE 1 25 (H) 01/16/2021    CREATININE 1 24 (H) 07/17/2020    CREATININE 1 19 02/22/2020   BUN and creatinine increased probable prerenal versus renal   Patient advised to increase fluid  Relevant Medications    hydrochlorothiazide (HYDRODIURIL) 12 5 mg tablet       Other    Hypercalcemia (Chronic)     Her calcium decrease from 10 3-10 1 after she decreased her calcium intake 500 mg t i d  To b i d  Edema of both lower legs (Chronic)     Better  Still has slightly  around ankles area  Most likely dependent and venous insufficiency  Patient does not like to wear elastic stockings  Advised to keep legs elevated         BMI 28 0-28 9,adult (Chronic)     Advised to watch calori  intake lose weight         Macular degeneration (Chronic)     Patient has been following up turns use  She does not drive anymore  Subjective:      Patient ID: Anabella Hunt is a 80 y o  female  Patient is here with her daughter for follow-up  Patient overall doing well  She denies any chest pain, shortness of breath, or pain in abdomen  She denies any cough, mucus, chills,  She has some swelling around the ankles which is chronic in nature        The following portions of the patient's history were reviewed and updated as appropriate:   Past Medical History:  She has a past medical history of Age related osteoporosis (1/22/2021), BMI 28 0-28 9,adult (1/22/2021), CKD (chronic kidney disease), CKD (chronic kidney disease) (1/22/2021), Colonoscopy refused, Dizziness, DJD (degenerative joint disease), Edema of both legs, Edema of both legs, Edema of both lower legs (1/22/2021), Essential hypertension (1/22/2021), GE reflux, General weakness, Heart murmur, aortic, History of breast cancer, Hypercalcemia, Hypercalcemia (1/22/2021), Hyperkalemia (1/22/2021), Hypertension, Macular degeneration, Macular degeneration, Macular degeneration (1/22/2021), Osteoporosis, Overweight, Primary osteoarthritis (1/22/2021), Skin lesion, and Stasis edema of both lower extremities  ,  _______________________________________________________________________  Medical Problems:  does not have any pertinent problems on file ,  _______________________________________________________________________  Past Surgical History:   has a past surgical history that includes Breast surgery; Squamous cell carcinoma excision; Cholecystectomy; Total hip arthroplasty (Bilateral); Total shoulder replacement (Right); Abdominal hysterectomy; Mastectomy (Left); Colonoscopy (07/18/2011); DXA procedure(historical) (01/29/2014); Mammo (historical) (10/05/2020); IR kyphoplasty/vertebroplasty; and [facility] CKD PROGRAM (HISTORICAL)  ,  _______________________________________________________________________  Family History:  family history includes Lung cancer in her father ,  _______________________________________________________________________  Social History:   reports that she has never smoked  She has never used smokeless tobacco  She reports previous alcohol use  She reports that she does not use drugs  ,  _______________________________________________________________________  Allergies:  is allergic to actonel [risedronate sodium]; amlodipine; and orudis [ketoprofen]     _______________________________________________________________________  Current Outpatient Medications   Medication Sig Dispense Refill    acetaminophen (TYLENOL) 500 mg tablet Take 500 mg by mouth 2 (two) times a day as needed      aspirin 81 mg chewable tablet Chew 81 mg daily      calcium carbonate (OS-HUMBERTO) 600 MG tablet Take 600 mg by mouth 2 (two) times a day with meals 500mg BID      hydrochlorothiazide (HYDRODIURIL) 12 5 mg tablet Take 1 tablet (12 5 mg total) by mouth daily 90 tablet 1    metoprolol succinate (Toprol XL) 25 mg 24 hr tablet 1 5 daily      metoprolol tartrate (LOPRESSOR) 50 mg tablet Take 50 mg by mouth every 12 (twelve) hours      Multiple Vitamins-Minerals (multivitamin with minerals) tablet Take 1 tablet by mouth daily      omega-3-acid ethyl esters (LOVAZA) 1 g capsule Take 2 g by mouth 2 (two) times a day      zinc gluconate 50 mg tablet Take 50 mg by mouth daily       No current facility-administered medications for this visit       _______________________________________________________________________  Review of Systems   Constitutional: Negative for chills, fatigue and fever  HENT: Negative for congestion, ear pain, postnasal drip, sinus pain, sore throat and trouble swallowing  Eyes: Negative for pain and visual disturbance  Respiratory: Negative for cough, chest tightness and shortness of breath  Cardiovascular: Negative for chest pain, palpitations and leg swelling  Gastrointestinal: Negative for abdominal pain, blood in stool, constipation, diarrhea and nausea  Genitourinary: Negative for dysuria, flank pain and frequency  Musculoskeletal: Positive for arthralgias (See gets sometime pain in her knees and shoulders but which is chronic in nature and not worsening taking acetaminophen p r n  )  Negative for myalgias  Skin: Negative for rash  Neurological: Negative for dizziness, speech difficulty, weakness and headaches  Hematological: Does not bruise/bleed easily  Psychiatric/Behavioral: Negative for behavioral problems  Objective:  Vitals:    01/22/21 0853   BP: 128/74   BP Location: Right arm   Patient Position: Sitting   Cuff Size: Adult   Pulse: 70   Temp: 98 2 °F (36 8 °C)   TempSrc: Tympanic   SpO2: 98%   Height: 5' 7" (1 702 m)     Body mass index is 28 19 kg/m²  Physical Exam  Vitals signs and nursing note reviewed  Constitutional:       Appearance: Normal appearance  HENT:      Head: Normocephalic  Right Ear: Tympanic membrane and external ear normal       Left Ear: Tympanic membrane and external ear normal       Mouth/Throat:      Comments: Patient has face mask on  Eyes:      General: No scleral icterus  Pupils: Pupils are equal, round, and reactive to light  Neck:      Musculoskeletal: No neck rigidity or muscular tenderness  Vascular: No carotid bruit  Cardiovascular:      Rate and Rhythm: Normal rate and regular rhythm  Pulses: Normal pulses  Heart sounds: Normal heart sounds  No murmur  Pulmonary:      Effort: Pulmonary effort is normal  No respiratory distress  Breath sounds: Normal breath sounds  No wheezing  Abdominal:      General: Abdomen is flat  Palpations: Abdomen is soft  There is no mass  Tenderness: There is no abdominal tenderness  There is no rebound  Musculoskeletal:      Right lower leg: Edema (Has stress edema mainly around ankle  Homans signs negative  has trace edema around ankle area, size negative ) present  Left lower leg: Edema ( has edema around ankle area  Homans signs negative ) present  Skin:     General: Skin is warm  Findings: No rash  Neurological:      General: No focal deficit present  Mental Status: She is alert  Motor: No weakness     Psychiatric:         Mood and Affect: Mood normal          Behavior: Behavior normal

## 2021-01-25 ENCOUNTER — IMMUNIZATIONS (OUTPATIENT)
Dept: FAMILY MEDICINE CLINIC | Facility: HOSPITAL | Age: 86
End: 2021-01-25

## 2021-01-25 DIAGNOSIS — Z23 ENCOUNTER FOR IMMUNIZATION: Primary | ICD-10-CM

## 2021-01-25 PROCEDURE — 91301 SARS-COV-2 / COVID-19 MRNA VACCINE (MODERNA) 100 MCG: CPT

## 2021-01-25 PROCEDURE — 0011A SARS-COV-2 / COVID-19 MRNA VACCINE (MODERNA) 100 MCG: CPT

## 2021-01-26 DIAGNOSIS — Z12.31 ENCOUNTER FOR SCREENING MAMMOGRAM FOR MALIGNANT NEOPLASM OF BREAST: ICD-10-CM

## 2021-02-05 DIAGNOSIS — I10 ESSENTIAL HYPERTENSION: Primary | Chronic | ICD-10-CM

## 2021-02-05 RX ORDER — METOPROLOL TARTRATE 50 MG/1
TABLET, FILM COATED ORAL
Qty: 180 TABLET | Refills: 1 | Status: SHIPPED | OUTPATIENT
Start: 2021-02-05 | End: 2021-04-23 | Stop reason: SDUPTHER

## 2021-02-22 ENCOUNTER — IMMUNIZATIONS (OUTPATIENT)
Dept: FAMILY MEDICINE CLINIC | Facility: HOSPITAL | Age: 86
End: 2021-02-22

## 2021-02-22 DIAGNOSIS — Z23 ENCOUNTER FOR IMMUNIZATION: Primary | ICD-10-CM

## 2021-02-22 PROCEDURE — 0012A SARS-COV-2 / COVID-19 MRNA VACCINE (MODERNA) 100 MCG: CPT | Performed by: NURSE PRACTITIONER

## 2021-02-22 PROCEDURE — 91301 SARS-COV-2 / COVID-19 MRNA VACCINE (MODERNA) 100 MCG: CPT | Performed by: NURSE PRACTITIONER

## 2021-03-22 ENCOUNTER — TELEPHONE (OUTPATIENT)
Dept: INTERNAL MEDICINE CLINIC | Facility: CLINIC | Age: 86
End: 2021-03-22

## 2021-03-22 DIAGNOSIS — C50.912 CARCINOMA OF LEFT FEMALE BREAST, UNSPECIFIED ESTROGEN RECEPTOR STATUS, UNSPECIFIED SITE OF BREAST (HCC): Primary | ICD-10-CM

## 2021-03-22 DIAGNOSIS — Z12.31 ENCOUNTER FOR SCREENING MAMMOGRAM FOR MALIGNANT NEOPLASM OF BREAST: ICD-10-CM

## 2021-03-22 NOTE — TELEPHONE ENCOUNTER
Has her appointment for her Mammogram set up for 4/9/21 - we need to fax an order to [facility] AT [facility] @ [phone]    Order needs to be for Diagnostic RT  Breast and RT breast US

## 2021-04-23 ENCOUNTER — OFFICE VISIT (OUTPATIENT)
Dept: INTERNAL MEDICINE CLINIC | Facility: CLINIC | Age: 86
End: 2021-04-23
Payer: MEDICARE

## 2021-04-23 VITALS
HEART RATE: 60 BPM | DIASTOLIC BLOOD PRESSURE: 82 MMHG | SYSTOLIC BLOOD PRESSURE: 128 MMHG | OXYGEN SATURATION: 99 % | TEMPERATURE: 98.9 F

## 2021-04-23 DIAGNOSIS — E87.5 HYPERKALEMIA: ICD-10-CM

## 2021-04-23 DIAGNOSIS — N18.32 STAGE 3B CHRONIC KIDNEY DISEASE (HCC): Chronic | ICD-10-CM

## 2021-04-23 DIAGNOSIS — I10 ESSENTIAL HYPERTENSION: Primary | Chronic | ICD-10-CM

## 2021-04-23 DIAGNOSIS — C50.012 MALIGNANT NEOPLASM OF NIPPLE OF LEFT BREAST IN FEMALE, UNSPECIFIED ESTROGEN RECEPTOR STATUS (HCC): ICD-10-CM

## 2021-04-23 DIAGNOSIS — R60.0 EDEMA OF BOTH LOWER LEGS: Chronic | ICD-10-CM

## 2021-04-23 DIAGNOSIS — M17.0 PRIMARY OSTEOARTHRITIS OF BOTH KNEES: Chronic | ICD-10-CM

## 2021-04-23 DIAGNOSIS — H35.30 MACULAR DEGENERATION OF BOTH EYES, UNSPECIFIED TYPE: Chronic | ICD-10-CM

## 2021-04-23 PROCEDURE — 99214 OFFICE O/P EST MOD 30 MIN: CPT | Performed by: INTERNAL MEDICINE

## 2021-04-23 RX ORDER — HYDROCHLOROTHIAZIDE 12.5 MG/1
12.5 TABLET ORAL DAILY
Qty: 90 TABLET | Refills: 1 | Status: SHIPPED | OUTPATIENT
Start: 2021-04-23 | End: 2021-10-21

## 2021-04-23 RX ORDER — METOPROLOL TARTRATE 50 MG/1
50 TABLET, FILM COATED ORAL 2 TIMES DAILY
Qty: 180 TABLET | Refills: 1 | Status: SHIPPED | OUTPATIENT
Start: 2021-04-23 | End: 2021-10-21

## 2021-04-23 RX ORDER — LUTEIN 10 MG
TABLET ORAL
COMMUNITY

## 2021-04-23 NOTE — ASSESSMENT & PLAN NOTE
She had a mammogram of her right breast April 9, 2021 was unremarkable except fat necrosis was advised follow-up in 1 year    Patient has stopped seeing her oncologist

## 2021-04-23 NOTE — ASSESSMENT & PLAN NOTE
Most likely dependent and secondary venous insufficiency  On small dose of HCTZ  Has stress edema of the right leg patient advised to wear elastic stockings    Will avoid more diuretics to avoid dehydration

## 2021-04-23 NOTE — PATIENT INSTRUCTIONS
Patient advised to continue present medications discussed with the patient medications and laboratory data in detail  Follow-up with me in 3 months    If any blood test was ordered please do 1 week prior to next appointment  If you have any questions please call the office [phone]

## 2021-04-23 NOTE — ASSESSMENT & PLAN NOTE
Patient has a blurring of vision  She does not drive she has been seeing ophthalmologist every 3 months and getting injection right eye

## 2021-04-23 NOTE — ASSESSMENT & PLAN NOTE
She gets sometime pain in the knees and sometime low back pain for which she takes p r n  Acetaminophen

## 2021-04-23 NOTE — PROGRESS NOTES
Assessment/Plan:    1  Essential hypertension  Assessment & Plan:  Blood pressure well controlled  Advised to continue present medication  Advised for low-salt diet    Orders:  -     metoprolol tartrate (LOPRESSOR) 50 mg tablet; Take 1 tablet (50 mg total) by mouth 2 (two) times a day  -     hydrochlorothiazide (HYDRODIURIL) 12 5 mg tablet; Take 1 tablet (12 5 mg total) by mouth daily  -     CBC and differential; Future  -     Comprehensive metabolic panel; Future    2  Primary osteoarthritis of both knees  Assessment & Plan:  She gets sometime pain in the knees and sometime low back pain for which she takes p r n  Acetaminophen  3  Hyperkalemia  Assessment & Plan:  Last time potassium was 5 1  Could be secondary underlying CKD, dehydration, and /or high potassium diet  Second done banana to at half benign 3-4 times per week she does not drink on just been will follow potassium level    Orders:  -     Comprehensive metabolic panel; Future    4  Stage 3b chronic kidney disease [facility])  Assessment & Plan:  Lab Results   Component Value Date    EGFR 37 01/16/2021    EGFR 37 07/17/2020    EGFR 39 02/22/2020    CREATININE 1 25 (H) 01/16/2021    CREATININE 1 24 (H) 07/17/2020    CREATININE 1 19 02/22/2020   Stable   Has elevated BUN creatinine possible some prerenal component  Advised to increase fluids  Will follow GFR  Orders:  -     Comprehensive metabolic panel; Future    5  Macular degeneration of both eyes, unspecified type  Assessment & Plan:  Patient has a blurring of vision  She does not drive she has been seeing ophthalmologist every 3 months and getting injection right eye  6  Edema of both lower legs  Assessment & Plan:  Most likely dependent and secondary venous insufficiency  On small dose of HCTZ  Has stress edema of the right leg patient advised to wear elastic stockings  Will avoid more diuretics to avoid dehydration    Orders:  -     TSH, 3rd generation; Future    7   Malignant neoplasm of nipple of left breast in female, unspecified estrogen receptor status ([facility] [address] )  Assessment & Plan:  She had a mammogram of her right breast April 9, 2021 was unremarkable except fat necrosis was advised follow-up in 1 year  Patient has stopped seeing her oncologist    See has a left breast mastectomy and she wears left breast prosthesis  Subjective:  Patient presents for follow-up  Patient ID: Laine Porter is a 80 y o  female  HPI   70-year-old white female patient presents for follow-up of her medical problems  She denies any chest pain, shortness of breath, cough  Denies any fever chills denies any nausea vomiting diarrhea pain abdomen she had a COVID-19 vaccination  She has a blurring of vision bilaterally due to macular degeneration for which she has been seeing ophthalmologist and has been getting injection in right  q 3 months per patient  The following portions of the patient's history were reviewed and updated as appropriate:     Past Medical History:  She has a past medical history of Age related osteoporosis (1/22/2021), BMI 28 0-28 9,adult (1/22/2021), CKD (chronic kidney disease), Colonoscopy refused, Dizziness, DJD (degenerative joint disease), Edema of both lower legs (1/22/2021), Essential hypertension (1/22/2021), GE reflux, General weakness, Heart murmur, aortic, History of breast cancer, Hypercalcemia, Hyperkalemia (1/22/2021), Hypertension, Macular degeneration, Osteoporosis, Osteoporosis, Overweight, Primary osteoarthritis (1/22/2021), Skin lesion, and Stasis edema of both lower extremities  ,  _______________________________________________________________________  Past Surgical History:   has a past surgical history that includes Breast surgery; Squamous cell carcinoma excision; Cholecystectomy; Total hip arthroplasty (Bilateral); Total shoulder replacement (Right); Abdominal hysterectomy; Mastectomy (Left);  Colonoscopy (07/18/2011); DXA procedure(historical) (01/29/2014); Mammo (historical) (10/05/2020); IR kyphoplasty/vertebroplasty; and [facility] CKD PROGRAM (HISTORICAL)  ,  _______________________________________________________________________  Family History:  family history includes Lung cancer in her father ,  _______________________________________________________________________  Social History:   reports that she has never smoked  She has never used smokeless tobacco  She reports previous alcohol use  She reports that she does not use drugs  ,  _______________________________________________________________________  Allergies:  is allergic to actonel [risedronate sodium], amlodipine, and orudis [ketoprofen]     _______________________________________________________________________  Current Outpatient Medications   Medication Sig Dispense Refill    acetaminophen (TYLENOL) 500 mg tablet Take 500 mg by mouth 2 (two) times a day as needed      aspirin 81 mg chewable tablet Chew 81 mg daily      calcium carbonate (OS-HUMBERTO) 600 MG tablet Take 600 mg by mouth 2 (two) times a day with meals 500mg BID      hydrochlorothiazide (HYDRODIURIL) 12 5 mg tablet Take 1 tablet (12 5 mg total) by mouth daily 90 tablet 1    Lutein 10 MG TABS Take by mouth      metoprolol tartrate (LOPRESSOR) 50 mg tablet Take 1 tablet (50 mg total) by mouth 2 (two) times a day 180 tablet 1    Multiple Vitamins-Minerals (multivitamin with minerals) tablet Take 1 tablet by mouth daily      omega-3-acid ethyl esters (LOVAZA) 1 g capsule Take 2 g by mouth 2 (two) times a day      zinc gluconate 50 mg tablet Take 50 mg by mouth daily       No current facility-administered medications for this visit      _______________________________________________________________________  Review of Systems   Constitutional: Negative for chills and fever  HENT: Negative for congestion, ear pain, hearing loss, nosebleeds, sinus pain, sore throat and trouble swallowing      Eyes: Positive for visual disturbance  Negative for discharge and redness  Respiratory: Negative for cough, chest tightness and shortness of breath  Cardiovascular: Positive for leg swelling ( has slight swelling of right lower leg )  Negative for chest pain and palpitations  Gastrointestinal: Negative for abdominal pain, blood in stool, constipation, diarrhea, nausea and vomiting  Genitourinary: Negative for dysuria, flank pain, frequency and hematuria  Musculoskeletal: Positive for arthralgias (Gets pain in the knees ) and back pain ( gets low back pain mainly after she vacuums and does housework  )  Negative for myalgias and neck pain  Skin: Negative for color change and rash  Neurological: Negative for dizziness, speech difficulty, weakness and headaches  Hematological: Does not bruise/bleed easily  Psychiatric/Behavioral: Negative for agitation and behavioral problems  Objective:  Vitals:    04/23/21 0850   BP: 128/82   BP Location: Right arm   Patient Position: Sitting   Cuff Size: Adult   Pulse: 60   Temp: 98 9 °F (37 2 °C)   TempSrc: Tympanic   SpO2: 99%     There is no height or weight on file to calculate BMI  Physical Exam  Vitals signs and nursing note reviewed  Constitutional:       General: She is not in acute distress  Appearance: Normal appearance  HENT:      Head: Normocephalic and atraumatic  Right Ear: Ear canal and external ear normal       Left Ear: Ear canal and external ear normal       Nose: Nose normal       Mouth/Throat:      Mouth: Mucous membranes are moist    Eyes:      General: No scleral icterus  Conjunctiva/sclera: Conjunctivae normal    Neck:      Musculoskeletal: Normal range of motion and neck supple  No muscular tenderness  Cardiovascular:      Rate and Rhythm: Normal rate and regular rhythm  Pulses: Normal pulses  Heart sounds: Murmur (Has a 1/6 systolic murmur aortic and left sternal border area) present     Pulmonary:      Effort: Pulmonary effort is normal       Breath sounds: Normal breath sounds  Abdominal:      General: Bowel sounds are normal       Palpations: Abdomen is soft  Tenderness: There is no abdominal tenderness  Musculoskeletal: Normal range of motion  General: Tenderness ( has mild tenderness in the knees on flexion extension has a crepitations ) present  Right lower leg: Edema ( has trace edema of right lower extremity Homans signs negative) present  Left lower leg: No edema  Skin:     General: Skin is warm  Findings: No rash  Neurological:      General: No focal deficit present  Mental Status: She is alert and oriented to person, place, and time  Psychiatric:         Mood and Affect: Mood normal          Behavior: Behavior normal        I spent 30 minutes with the patient today    More than 50% time spent for reviewing of external notes, reviewing of the results of diagnostics test, management of care, patient education and ordering of test

## 2021-04-23 NOTE — ASSESSMENT & PLAN NOTE
Last time potassium was 5 1  Could be secondary underlying CKD, dehydration, and /or high potassium diet    Second done banana to at half benign 3-4 times per week she does not drink on just been will follow potassium level

## 2021-04-23 NOTE — ASSESSMENT & PLAN NOTE
Lab Results   Component Value Date    EGFR 37 01/16/2021    EGFR 37 07/17/2020    EGFR 39 02/22/2020    CREATININE 1 25 (H) 01/16/2021    CREATININE 1 24 (H) 07/17/2020    CREATININE 1 19 02/22/2020   Stable   Has elevated BUN creatinine possible some prerenal component  Advised to increase fluids  Will follow GFR

## 2021-06-07 ENCOUNTER — TELEPHONE (OUTPATIENT)
Dept: INTERNAL MEDICINE CLINIC | Facility: CLINIC | Age: 86
End: 2021-06-07

## 2021-06-07 NOTE — TELEPHONE ENCOUNTER
Pt is need of a new Left Breast Prosthesis  Hiral Harrisonzier is 8years old  [facility] Londono knows you will not be back in the office until 6/14 - they can wait for you to return      You will need to fax the order and there last OV notes to [facility] - Fax# [phone]

## 2021-07-17 ENCOUNTER — APPOINTMENT (OUTPATIENT)
Dept: LAB | Facility: HOSPITAL | Age: 86
End: 2021-07-17
Attending: INTERNAL MEDICINE
Payer: MEDICARE

## 2021-07-17 DIAGNOSIS — N18.32 STAGE 3B CHRONIC KIDNEY DISEASE (HCC): Chronic | ICD-10-CM

## 2021-07-17 DIAGNOSIS — E87.5 HYPERKALEMIA: ICD-10-CM

## 2021-07-17 DIAGNOSIS — R60.0 EDEMA OF BOTH LOWER LEGS: Chronic | ICD-10-CM

## 2021-07-17 DIAGNOSIS — I10 ESSENTIAL HYPERTENSION: Chronic | ICD-10-CM

## 2021-07-17 LAB
ALBUMIN SERPL BCP-MCNC: 4 G/DL (ref 3.4–4.8)
ALP SERPL-CCNC: 79.8 U/L (ref 35–140)
ALT SERPL W P-5'-P-CCNC: 13 U/L (ref 5–54)
ANION GAP SERPL CALCULATED.3IONS-SCNC: 7 MMOL/L (ref 4–13)
AST SERPL W P-5'-P-CCNC: 20 U/L (ref 15–41)
BASOPHILS # BLD AUTO: 0.08 THOUSANDS/ΜL (ref 0–0.1)
BASOPHILS NFR BLD AUTO: 1 % (ref 0–1)
BILIRUB SERPL-MCNC: 0.81 MG/DL (ref 0.3–1.2)
BUN SERPL-MCNC: 19 MG/DL (ref 6–20)
CALCIUM SERPL-MCNC: 9.7 MG/DL (ref 8.4–10.2)
CHLORIDE SERPL-SCNC: 97 MMOL/L (ref 96–108)
CO2 SERPL-SCNC: 28 MMOL/L (ref 22–33)
CREAT SERPL-MCNC: 1.09 MG/DL (ref 0.4–1.1)
EOSINOPHIL # BLD AUTO: 0.39 THOUSAND/ΜL (ref 0–0.61)
EOSINOPHIL NFR BLD AUTO: 4 % (ref 0–6)
ERYTHROCYTE [DISTWIDTH] IN BLOOD BY AUTOMATED COUNT: 14 % (ref 11.6–15.1)
GFR SERPL CREATININE-BSD FRML MDRD: 43 ML/MIN/1.73SQ M
GLUCOSE P FAST SERPL-MCNC: 95 MG/DL (ref 70–105)
HCT VFR BLD AUTO: 41.3 % (ref 34.8–46.1)
HGB BLD-MCNC: 14.1 G/DL (ref 11.5–15.4)
IMM GRANULOCYTES # BLD AUTO: 0.03 THOUSAND/UL (ref 0–0.2)
IMM GRANULOCYTES NFR BLD AUTO: 0 % (ref 0–2)
LYMPHOCYTES # BLD AUTO: 2.47 THOUSANDS/ΜL (ref 0.6–4.47)
LYMPHOCYTES NFR BLD AUTO: 27 % (ref 14–44)
MCH RBC QN AUTO: 30.9 PG (ref 26.8–34.3)
MCHC RBC AUTO-ENTMCNC: 34.1 G/DL (ref 31.4–37.4)
MCV RBC AUTO: 90 FL (ref 82–98)
MONOCYTES # BLD AUTO: 0.98 THOUSAND/ΜL (ref 0.17–1.22)
MONOCYTES NFR BLD AUTO: 11 % (ref 4–12)
NEUTROPHILS # BLD AUTO: 5.3 THOUSANDS/ΜL (ref 1.85–7.62)
NEUTS SEG NFR BLD AUTO: 57 % (ref 43–75)
PLATELET # BLD AUTO: 240 THOUSANDS/UL (ref 149–390)
PMV BLD AUTO: 9.6 FL (ref 8.9–12.7)
POTASSIUM SERPL-SCNC: 4.5 MMOL/L (ref 3.5–5)
PROT SERPL-MCNC: 6.6 G/DL (ref 6.4–8.3)
RBC # BLD AUTO: 4.57 MILLION/UL (ref 3.81–5.12)
SODIUM SERPL-SCNC: 132 MMOL/L (ref 133–145)
TSH SERPL DL<=0.05 MIU/L-ACNC: 7.36 UIU/ML (ref 0.34–5.6)
WBC # BLD AUTO: 9.25 THOUSAND/UL (ref 4.31–10.16)

## 2021-07-17 PROCEDURE — 80053 COMPREHEN METABOLIC PANEL: CPT

## 2021-07-17 PROCEDURE — 84443 ASSAY THYROID STIM HORMONE: CPT

## 2021-07-17 PROCEDURE — 36415 COLL VENOUS BLD VENIPUNCTURE: CPT

## 2021-07-17 PROCEDURE — 85025 COMPLETE CBC W/AUTO DIFF WBC: CPT

## 2021-07-23 ENCOUNTER — OFFICE VISIT (OUTPATIENT)
Dept: INTERNAL MEDICINE CLINIC | Facility: CLINIC | Age: 86
End: 2021-07-23
Payer: MEDICARE

## 2021-07-23 VITALS
RESPIRATION RATE: 16 BRPM | SYSTOLIC BLOOD PRESSURE: 140 MMHG | BODY MASS INDEX: 28.19 KG/M2 | OXYGEN SATURATION: 97 % | DIASTOLIC BLOOD PRESSURE: 90 MMHG | HEIGHT: 67 IN | TEMPERATURE: 97.7 F | HEART RATE: 60 BPM

## 2021-07-23 DIAGNOSIS — C50.012 MALIGNANT NEOPLASM OF NIPPLE OF LEFT BREAST IN FEMALE, UNSPECIFIED ESTROGEN RECEPTOR STATUS (HCC): ICD-10-CM

## 2021-07-23 DIAGNOSIS — R20.0 NUMBNESS AND TINGLING: ICD-10-CM

## 2021-07-23 DIAGNOSIS — H35.30 MACULAR DEGENERATION OF BOTH EYES, UNSPECIFIED TYPE: Chronic | ICD-10-CM

## 2021-07-23 DIAGNOSIS — R60.0 EDEMA OF BOTH LOWER LEGS: Chronic | ICD-10-CM

## 2021-07-23 DIAGNOSIS — Z00.00 MEDICARE ANNUAL WELLNESS VISIT, SUBSEQUENT: Primary | ICD-10-CM

## 2021-07-23 DIAGNOSIS — E03.9 ACQUIRED HYPOTHYROIDISM: ICD-10-CM

## 2021-07-23 DIAGNOSIS — N18.32 STAGE 3B CHRONIC KIDNEY DISEASE (HCC): Chronic | ICD-10-CM

## 2021-07-23 DIAGNOSIS — I10 ESSENTIAL HYPERTENSION: Chronic | ICD-10-CM

## 2021-07-23 DIAGNOSIS — R20.2 NUMBNESS AND TINGLING: ICD-10-CM

## 2021-07-23 PROCEDURE — 1123F ACP DISCUSS/DSCN MKR DOCD: CPT | Performed by: INTERNAL MEDICINE

## 2021-07-23 PROCEDURE — G0439 PPPS, SUBSEQ VISIT: HCPCS | Performed by: INTERNAL MEDICINE

## 2021-07-23 PROCEDURE — 99213 OFFICE O/P EST LOW 20 MIN: CPT | Performed by: INTERNAL MEDICINE

## 2021-07-23 RX ORDER — MULTIVIT-MIN/IRON/FOLIC ACID/K 18-600-40
CAPSULE ORAL
COMMUNITY

## 2021-07-23 RX ORDER — LEVOTHYROXINE SODIUM 0.03 MG/1
25 TABLET ORAL DAILY
Qty: 90 TABLET | Refills: 1 | Status: SHIPPED | OUTPATIENT
Start: 2021-07-23 | End: 2021-10-22 | Stop reason: SDUPTHER

## 2021-07-23 RX ORDER — IBUPROFEN 200 MG
TABLET ORAL EVERY 6 HOURS PRN
COMMUNITY
End: 2021-07-23 | Stop reason: ALTCHOICE

## 2021-07-23 NOTE — PROGRESS NOTES
Assessment/Plan:    1  Medicare annual wellness visit, subsequent    2  Essential hypertension  Assessment & Plan:  Blood pressure elevated  Patient does not want to add any more medication  Will continue her metoprolol and hydrochlorothiazide for now  Her sodium is 132 so will avoid strict low-salt diet  Able to pressure persistent elevated will adjust medications  Orders:  -     Basic metabolic panel; Future    3  Macular degeneration of both eyes, unspecified type  Assessment & Plan:  Patient has been followed by ophthalmologist and getting a injection eye  She does not drive anymore  4  Edema of both lower legs  Assessment & Plan:  Better after being on HCTZ  once a day  Advised to keep legs elevated  Orders:  -     Basic metabolic panel; Future    5  Malignant neoplasm of nipple of left breast in female, unspecified estrogen receptor status [facility])  Assessment & Plan:  Patient used to follow oncologist Dr Linda Sebastian but she does not follow with any oncologist now  She had a mammogram of the right breast which was unremarkable  6  Acquired hypothyroidism  Assessment & Plan:  Her TSH 7 4 it was 3 4 a year ago  Patient would like to start medication so will start levothyroxine 25 mcg once a day every morning  Will follow TSH level next time  Orders:  -     levothyroxine 25 mcg tablet; Take 1 tablet (25 mcg total) by mouth daily  -     TSH, 3rd generation; Future    7  Numbness and tingling  Assessment & Plan:  Complaint on tingling and numbness of toes but no numbness or tingling in legs Or foot  Will check patient's K50 folic acid level  No signs of diabetes mellitus  Advised to proper size of Shoes to avoid any compression of toes  Will observe  Orders:  -     Vitamin B12; Future  -     Folate; Future    8   Stage 3b chronic kidney disease [facility])  Assessment & Plan:  Lab Results   Component Value Date    EGFR 43 07/17/2021    EGFR 37 01/16/2021    EGFR 37 07/17/2020    CREATININE 1 09 07/17/2021    CREATININE 1 25 (H) 01/16/2021    CREATININE 1 24 (H) 07/17/2020   GFR slightly improved  Will follow renal function advised to increase fluids  Subjective:  Patient presents for annual wellness exam as well as follow-up of her medical problems  Patient ID: Rajesh Griffiths is a 80 y o  female  HPI   77-year-old white female patient presents for annual wellness exam as well as follow-up of her medical problems  She denies any chest pain, shortness of breath, pain abdomen  Denies any cough, fever, chills  Denies any nausea, vomiting diarrhea  She got her COVID-19 vaccination  She has been followed by ophthalmologist for her macular degeneration  She does not drive anymore  The following portions of the patient's history were reviewed and updated as appropriate:     Past Medical History:  She has a past medical history of Age related osteoporosis (1/22/2021), BMI 28 0-28 9,adult (1/22/2021), CKD (chronic kidney disease), Colonoscopy refused, Dizziness, DJD (degenerative joint disease), Edema of both lower legs (1/22/2021), Essential hypertension (1/22/2021), GE reflux, General weakness, Heart murmur, aortic, History of breast cancer, Hypercalcemia, Hyperkalemia (1/22/2021), Hypertension, Hypothyroidism (7/23/2021), Macular degeneration, Numbness and tingling (7/23/2021), Osteoporosis, Osteoporosis, Overweight, Primary osteoarthritis (1/22/2021), Skin lesion, and Stasis edema of both lower extremities  ,  _______________________________________________________________________  Past Surgical History:   has a past surgical history that includes Breast surgery; Squamous cell carcinoma excision; Cholecystectomy; Total hip arthroplasty (Bilateral); Total shoulder replacement (Right); Abdominal hysterectomy; Mastectomy (Left); Colonoscopy (07/18/2011); DXA procedure(historical) (01/29/2014); Mammo (historical) (10/05/2020);  IR kyphoplasty/vertebroplasty; and LITHOLINK CKD PROGRAM (HISTORICAL)  ,  _______________________________________________________________________  Family History:  family history includes Lung cancer in her father ,  _______________________________________________________________________  Social History:   reports that she has never smoked  She has never used smokeless tobacco  She reports previous alcohol use  She reports that she does not use drugs  ,  _______________________________________________________________________  Allergies:  is allergic to actonel [risedronate sodium], amlodipine, and orudis [ketoprofen]     _______________________________________________________________________  Current Outpatient Medications   Medication Sig Dispense Refill    acetaminophen (TYLENOL) 500 mg tablet Take 500 mg by mouth 2 (two) times a day as needed      Ascorbic Acid (Vitamin C) 500 MG CAPS Take by mouth      aspirin 81 mg chewable tablet Chew 81 mg daily      calcium carbonate (OS-HUMBERTO) 600 MG tablet Take 600 mg by mouth 2 (two) times a day with meals 500mg BID      hydrochlorothiazide (HYDRODIURIL) 12 5 mg tablet Take 1 tablet (12 5 mg total) by mouth daily 90 tablet 1    Lutein 10 MG TABS Take by mouth      metoprolol tartrate (LOPRESSOR) 50 mg tablet Take 1 tablet (50 mg total) by mouth 2 (two) times a day 180 tablet 1    Multiple Vitamins-Minerals (multivitamin with minerals) tablet Take 1 tablet by mouth daily      omega-3-acid ethyl esters (LOVAZA) 1 g capsule Take 2 g by mouth 2 (two) times a day      zinc gluconate 50 mg tablet Take 50 mg by mouth daily      levothyroxine 25 mcg tablet Take 1 tablet (25 mcg total) by mouth daily 90 tablet 1     No current facility-administered medications for this visit      _______________________________________________________________________  Review of Systems   Constitutional: Negative for chills and fever     HENT: Negative for congestion, ear pain, hearing loss, nosebleeds, sinus pain, sore throat and trouble swallowing  Eyes: Positive for visual disturbance ( she has macular degeneration and has a poor eyesight followed by ophthalmologist getting injections)  Negative for discharge and redness  Respiratory: Negative for cough, chest tightness and shortness of breath  Cardiovascular: Negative for chest pain and palpitations  Gastrointestinal: Negative for abdominal pain, blood in stool, constipation, diarrhea, nausea and vomiting  Genitourinary: Negative for dysuria, flank pain, frequency and hematuria  Musculoskeletal: Positive for arthralgias (Sometimes gets pain in the knees  )  Negative for myalgias and neck pain  Skin: Negative for color change and rash  Neurological: Positive for numbness (  Complain of tingling numbness of toes of both feet  )  Negative for dizziness, speech difficulty, weakness and headaches  Hematological: Does not bruise/bleed easily  Psychiatric/Behavioral: Negative for agitation and behavioral problems  Objective:  Vitals:    07/23/21 1028   BP: 140/90   BP Location: Right arm   Patient Position: Sitting   Cuff Size: Adult   Pulse: 60   Resp: 16   Temp: 97 7 °F (36 5 °C)   TempSrc: Temporal   SpO2: 97%   Height: 5' 7" (1 702 m)     Body mass index is 28 19 kg/m²  Physical Exam  Vitals and nursing note reviewed  Constitutional:       General: She is not in acute distress  Appearance: Normal appearance  HENT:      Head: Normocephalic and atraumatic  Right Ear: Tympanic membrane, ear canal and external ear normal       Left Ear: Tympanic membrane, ear canal and external ear normal       Mouth/Throat:      Comments: Patient has a face mask on  Eyes:      General: No scleral icterus  Right eye: No discharge  Left eye: No discharge  Conjunctiva/sclera: Conjunctivae normal    Cardiovascular:      Rate and Rhythm: Normal rate and regular rhythm  Pulses: Normal pulses     Pulmonary:      Effort: Pulmonary effort is normal  Breath sounds: Normal breath sounds  Abdominal:      General: Bowel sounds are normal       Palpations: Abdomen is soft  Tenderness: There is no abdominal tenderness  Musculoskeletal:         General: Normal range of motion  Cervical back: Normal range of motion and neck supple  No muscular tenderness  Right lower leg: No edema  Left lower leg: No edema  Skin:     General: Skin is warm  Findings: No rash  Neurological:      General: No focal deficit present  Mental Status: She is alert and oriented to person, place, and time     Psychiatric:         Mood and Affect: Mood normal          Behavior: Behavior normal

## 2021-07-23 NOTE — PROGRESS NOTES
Assessment and Plan:     Problem List Items Addressed This Visit     None           Preventive health issues were discussed with patient, and age appropriate screening tests were ordered as noted in patient's After Visit Summary  Personalized health advice and appropriate referrals for health education or preventive services given if needed, as noted in patient's After Visit Summary       History of Present Illness:     Patient presents for Medicare Annual Wellness visit    Patient Care Team:  Jocelyne Lebron MD as PCP - General (Internal Medicine)     Problem List:     Patient Active Problem List   Diagnosis    Essential hypertension    Hypercalcemia    Hyperkalemia    CKD (chronic kidney disease)    Edema of both lower legs    Age related osteoporosis    Primary osteoarthritis    BMI 28 0-28 9,adult    Macular degeneration    Malignant neoplasm of nipple of left breast in female [facility])      Past Medical and Surgical History:     Past Medical History:   Diagnosis Date    Age related osteoporosis 1/22/2021    BMI 28 0-28 9,adult 1/22/2021    CKD (chronic kidney disease)     Colonoscopy refused     Dizziness     DJD (degenerative joint disease)     Edema of both lower legs 1/22/2021    Essential hypertension 1/22/2021    GE reflux     General weakness     Heart murmur, aortic     History of breast cancer     Hypercalcemia     Hyperkalemia 1/22/2021    Hypertension     Macular degeneration     Osteoporosis     Osteoporosis     Overweight     Primary osteoarthritis 1/22/2021    Skin lesion     Stasis edema of both lower extremities      Past Surgical History:   Procedure Laterality Date    ABDOMINAL HYSTERECTOMY      BREAST SURGERY      CHOLECYSTECTOMY      COLONOSCOPY  07/18/2011    Does not want any more    DXA PROCEDURE (HISTORICAL)  01/29/2014    Does not want any more    IR KYPHOPLASTY/VERTEBROPLASTY      LITHOLINK CKD PROGRAM (HISTORICAL)      MAMMO (HISTORICAL) 10/05/2020    Mammo and US     MASTECTOMY Left     SQUAMOUS CELL CARCINOMA EXCISION      Left forearm     TOTAL HIP ARTHROPLASTY Bilateral     TOTAL SHOULDER REPLACEMENT Right       Family History:     Family History   Problem Relation Age of Onset    Lung cancer Father       Social History:     Social History     Socioeconomic History    Marital status:      Spouse name: None    Number of children: None    Years of education: None    Highest education level: None   Occupational History    Occupation: Retired    Tobacco Use    Smoking status: Never Smoker    Smokeless tobacco: Never Used   Vaping Use    Vaping Use: Never used   Substance and Sexual Activity    Alcohol use: Not Currently    Drug use: Never    Sexual activity: None   Other Topics Concern    None   Social History Narrative    Annual eye exam: Follows Ophthalmology     Social Determinants of Health     Financial Resource Strain:     Difficulty of Paying Living Expenses:    Food Insecurity:     Worried About Running Out of Food in the Last Year:     [address] in the Last Year:    Transportation Needs:     Lack of Transportation (Medical):      Lack of Transportation (Non-Medical):    Physical Activity:     Days of Exercise per Week:     Minutes of Exercise per Session:    Stress:     Feeling of Stress :    Social Connections:     Frequency of Communication with Friends and Family:     Frequency of Social Gatherings with Friends and Family:     Attends Buddhism Services:     Active Member of Clubs or Organizations:     Attends Club or Organization Meetings:     Marital Status:    Intimate Partner Violence:     Fear of Current or Ex-Partner:     Emotionally Abused:     Physically Abused:     Sexually Abused:       Medications and Allergies:     Current Outpatient Medications   Medication Sig Dispense Refill    acetaminophen (TYLENOL) 500 mg tablet Take 500 mg by mouth 2 (two) times a day as needed      Ascorbic Acid (Vitamin C) 500 MG CAPS Take by mouth      aspirin 81 mg chewable tablet Chew 81 mg daily      calcium carbonate (OS-HUMBERTO) 600 MG tablet Take 600 mg by mouth 2 (two) times a day with meals 500mg BID      hydrochlorothiazide (HYDRODIURIL) 12 5 mg tablet Take 1 tablet (12 5 mg total) by mouth daily 90 tablet 1    ibuprofen (MOTRIN) 200 mg tablet Take by mouth every 6 (six) hours as needed for mild pain      Lutein 10 MG TABS Take by mouth      metoprolol tartrate (LOPRESSOR) 50 mg tablet Take 1 tablet (50 mg total) by mouth 2 (two) times a day 180 tablet 1    Multiple Vitamins-Minerals (multivitamin with minerals) tablet Take 1 tablet by mouth daily      omega-3-acid ethyl esters (LOVAZA) 1 g capsule Take 2 g by mouth 2 (two) times a day      zinc gluconate 50 mg tablet Take 50 mg by mouth daily       No current facility-administered medications for this visit  Allergies   Allergen Reactions    Actonel [Risedronate Sodium] Fatigue    Amlodipine Fatigue    Orudis [Ketoprofen] Fatigue      Immunizations:     Immunization History   Administered Date(s) Administered    SARS-CoV-2 / COVID-19 mRNA IM (Moderna) 01/25/2021, 02/22/2021    Tdap 02/22/2020      Health Maintenance:         Topic Date Due    Breast Cancer Screening: Mammogram  04/09/2022         Topic Date Due    Pneumococcal Vaccine: 65+ Years (2 of 2 - PPSV23) 11/24/1990    Influenza Vaccine (1) 09/01/2021      Medicare Health Risk Assessment:     /98 (BP Location: Right arm, Patient Position: Sitting, Cuff Size: Adult)   Pulse 58   Temp 97 7 °F (36 5 °C) (Temporal)   Resp 16   Ht 5' 7" (1 702 m)   SpO2 97%   BMI 28 19 kg/m²      Ruddy Norris is here for her Subsequent Wellness visit  Health Risk Assessment:   Patient rates overall health as good  Patient feels that their physical health rating is same  Patient is very satisfied with their life  Eyesight was rated as slightly worse  Hearing was rated as same   Patient feels that their emotional and mental health rating is same  Patients states they are never, rarely angry  Patient states they are sometimes unusually tired/fatigued  Pain experienced in the last 7 days has been some  Patient's pain rating has been 5/10  Patient states that she has experienced no weight loss or gain in last 6 months  Depression Screening:   PHQ-2 Score: 0      Fall Risk Screening: In the past year, patient has experienced: no history of falling in past year      Urinary Incontinence Screening:   Patient has leaked urine accidently in the last six months  Home Safety:  Patient has trouble with stairs inside or outside of their home  Patient has working smoke alarms and has working carbon monoxide detector  Home safety hazards include: household clutter and not having non-slip bath and/or shower mats  Pt has a shower bench    Nutrition:   Current diet is No Added Salt  Medications:   Patient is currently taking over-the-counter supplements  OTC medications include: see medication list  Patient is able to manage medications  Activities of Daily Living (ADLs)/Instrumental Activities of Daily Living (IADLs):   Walk and transfer into and out of bed and chair?: Yes  Dress and groom yourself?: Yes    Bathe or shower yourself?: Yes    Feed yourself?  Yes  Do your laundry/housekeeping?: Yes  Manage your money, pay your bills and track your expenses?: No  Make your own meals?: Yes    Do your own shopping?: No    ADL comments: Pt does her own laundry, but has help from family members cleaning her home    Previous Hospitalizations:   Any hospitalizations or ED visits within the last 12 months?: No      Advance Care Planning:   Living will: Yes    Durable POA for healthcare: No    Advanced directive: Yes    Advanced directive counseling given: Yes    Five wishes given: Yes    Patient declined ACP directive: No    End of Life Decisions reviewed with patient: Yes      Cognitive Screening: Provider or family/friend/caregiver concerned regarding cognition?: No    PREVENTIVE SCREENINGS      Cardiovascular Screening:    General: Screening Current      Diabetes Screening:     General: Screening Current      Colorectal Cancer Screening:     General: Screening Not Indicated      Breast Cancer Screening:     General: History Breast Cancer and Screening Current      Cervical Cancer Screening:    General: Screening Not Indicated      Osteoporosis Screening:    General: History Osteoporosis and Risks and Benefits Discussed      Abdominal Aortic Aneurysm (AAA) Screening:        General: Screening Not Indicated      Lung Cancer Screening:     General: Screening Not Indicated    Screening, Brief Intervention, and Referral to Treatment (SBIRT)    Screening  Typical number of drinks in a day: 0  Typical number of drinks in a week: 1  Interpretation: Low risk drinking behavior  Single Item Drug Screening:  How often have you used an illegal drug (including marijuana) or a prescription medication for non-medical reasons in the past year? never    Single Item Drug Screen Score: 0  Interpretation: Negative screen for possible drug use disorder    Brief Intervention  Alcohol & drug use screenings were reviewed  No concerns regarding substance use disorder identified  Other Counseling Topics:   Skin self-exam, sunscreen and calcium and vitamin D intake and regular weightbearing exercise         Jayme Pang MD

## 2021-07-25 NOTE — ASSESSMENT & PLAN NOTE
Lab Results   Component Value Date    EGFR 43 07/17/2021    EGFR 37 01/16/2021    EGFR 37 07/17/2020    CREATININE 1 09 07/17/2021    CREATININE 1 25 (H) 01/16/2021    CREATININE 1 24 (H) 07/17/2020   GFR slightly improved  Will follow renal function advised to increase fluids

## 2021-07-25 NOTE — ASSESSMENT & PLAN NOTE
Her TSH 7 4 it was 3 4 a year ago  Patient would like to start medication so will start levothyroxine 25 mcg once a day every morning  Will follow TSH level next time

## 2021-07-25 NOTE — ASSESSMENT & PLAN NOTE
Patient used to follow oncologist Dr Adela Ortega but she does not follow with any oncologist now  She had a mammogram of the right breast which was unremarkable

## 2021-07-25 NOTE — ASSESSMENT & PLAN NOTE
Complaint on tingling and numbness of toes but no numbness or tingling in legs Or foot  Will check patient's H28 folic acid level  No signs of diabetes mellitus  Advised to proper size of Shoes to avoid any compression of toes  Will observe

## 2021-07-25 NOTE — ASSESSMENT & PLAN NOTE
Blood pressure elevated  Patient does not want to add any more medication  Will continue her metoprolol and hydrochlorothiazide for now  Her sodium is 132 so will avoid strict low-salt diet  Able to pressure persistent elevated will adjust medications

## 2021-09-15 ENCOUNTER — OFFICE VISIT (OUTPATIENT)
Dept: INTERNAL MEDICINE CLINIC | Facility: CLINIC | Age: 86
End: 2021-09-15
Payer: MEDICARE

## 2021-09-15 VITALS
DIASTOLIC BLOOD PRESSURE: 82 MMHG | TEMPERATURE: 98.3 F | SYSTOLIC BLOOD PRESSURE: 128 MMHG | HEART RATE: 80 BPM | OXYGEN SATURATION: 97 %

## 2021-09-15 DIAGNOSIS — E03.9 ACQUIRED HYPOTHYROIDISM: ICD-10-CM

## 2021-09-15 DIAGNOSIS — S00.03XA HEMATOMA OF SCALP, INITIAL ENCOUNTER: ICD-10-CM

## 2021-09-15 DIAGNOSIS — S61.011A LACERATION OF RIGHT THUMB WITHOUT FOREIGN BODY WITHOUT DAMAGE TO NAIL, INITIAL ENCOUNTER: Primary | ICD-10-CM

## 2021-09-15 DIAGNOSIS — Z23 NEED FOR PROPHYLACTIC VACCINATION AND INOCULATION AGAINST INFLUENZA: ICD-10-CM

## 2021-09-15 DIAGNOSIS — I10 ESSENTIAL HYPERTENSION: Chronic | ICD-10-CM

## 2021-09-15 PROCEDURE — G0008 ADMIN INFLUENZA VIRUS VAC: HCPCS | Performed by: INTERNAL MEDICINE

## 2021-09-15 PROCEDURE — 99214 OFFICE O/P EST MOD 30 MIN: CPT | Performed by: INTERNAL MEDICINE

## 2021-09-15 PROCEDURE — 90662 IIV NO PRSV INCREASED AG IM: CPT | Performed by: INTERNAL MEDICINE

## 2021-09-15 RX ORDER — DOXYCYCLINE HYCLATE 100 MG
100 TABLET ORAL 2 TIMES DAILY
Qty: 14 TABLET | Refills: 0 | Status: SHIPPED | OUTPATIENT
Start: 2021-09-15 | End: 2021-09-22

## 2021-09-15 NOTE — PATIENT INSTRUCTIONS
Patient advised to continue present medications discussed with the patient medications and laboratory data in detail    Follow-up with me as scheduled  If any blood test was ordered please do 1 week prior to next appointment  If you have any questions please call the office [phone]

## 2021-09-15 NOTE — ASSESSMENT & PLAN NOTE
Under sterile technique   Sutures removed from right thumb  Has minimal erythema around sutures line and distal phalanx  No discharge  Questionable mild cellulitis  We prescribe doxycycline

## 2021-09-15 NOTE — ASSESSMENT & PLAN NOTE
Has a hematoma to the left frontal area  Advised to apply warm compresses t i d   Patient had a CT scan of the brain in the emergency room

## 2021-09-15 NOTE — PROGRESS NOTES
Assessment/Plan:    1  Laceration of right thumb without foreign body without damage to nail, initial encounter  Assessment & Plan:  Under sterile technique   Sutures removed from right thumb  Has minimal erythema around sutures line and distal phalanx  No discharge  Questionable mild cellulitis  We prescribe doxycycline  Orders:  -     doxycycline hyclate (VIBRA-TABS) 100 mg tablet; Take 1 tablet (100 mg total) by mouth 2 (two) times a day for 7 days    2  Essential hypertension  Assessment & Plan:    Blood pressure well controlled  Advised to continue present medications  3  Hematoma of scalp, initial encounter  Assessment & Plan:    Has a hematoma to the left frontal area  Advised to apply warm compresses t i d   Patient had a CT scan of the brain in the emergency room  4  Need for prophylactic vaccination and inoculation against influenza  -     influenza vaccine, high-dose, PF 0 7 mL (FLUZONE HIGH-DOSE)    5  Acquired hypothyroidism  Assessment & Plan:    Continue same dose of levothyroxine  Her blood tests and CT scans report reviewed     Subjective:   Patient presents for removal of sutures and follow-up after emergency room visit after her fall  Patient ID: Lesa Stevenson is a 80 y o  female  HPI     60-year-old white female who fell accidentally at home about 12 days ago went to emergency room had a CT of the     Brain, chest, abdomen and pelvis  Had a CT scan of C-spine, T-spines, lumbar spines,  Blood test  And sutures placed in her right thumb for  Laceration  She was also given tetanus injection she had any chest pain, shortness of breath, pain in abdomen  Denies any headache or focal weakness  Has a lump on left frontal head  Denies any fever or chills or cough      The following portions of the patient's history were reviewed and updated as appropriate:     Past Medical History:  She has a past medical history of Age related osteoporosis (1/22/2021), BMI 28 0-28 9,adult (1/22/2021), CKD (chronic kidney disease), Colonoscopy refused, Dizziness, DJD (degenerative joint disease), Edema of both lower legs (1/22/2021), Essential hypertension (1/22/2021), GE reflux, General weakness, Heart murmur, aortic, Hematoma of scalp (9/15/2021), History of breast cancer, Hypercalcemia, Hyperkalemia (1/22/2021), Hypertension, Hypothyroidism (7/23/2021), Laceration of right thumb (9/15/2021), Macular degeneration, Numbness and tingling (7/23/2021), Osteoporosis, Osteoporosis, Overweight, Primary osteoarthritis (1/22/2021), Skin lesion, and Stasis edema of both lower extremities  ,  _______________________________________________________________________  Past Surgical History:   has a past surgical history that includes Breast surgery; Squamous cell carcinoma excision; Cholecystectomy; Total hip arthroplasty (Bilateral); Total shoulder replacement (Right); Abdominal hysterectomy; Mastectomy (Left); Colonoscopy (07/18/2011); DXA procedure(historical) (01/29/2014); Mammo (historical) (10/05/2020); IR kyphoplasty/vertebroplasty; and [facility] CKD PROGRAM (HISTORICAL)  ,  _______________________________________________________________________  Family History:  family history includes Lung cancer in her father ,  _______________________________________________________________________  Social History:   reports that she has never smoked  She has never used smokeless tobacco  She reports previous alcohol use  She reports that she does not use drugs  ,  _______________________________________________________________________  Allergies:  is allergic to actonel [risedronate sodium], amlodipine, and orudis [ketoprofen]     _______________________________________________________________________  Current Outpatient Medications   Medication Sig Dispense Refill    acetaminophen (TYLENOL) 500 mg tablet Take 500 mg by mouth 2 (two) times a day as needed      Ascorbic Acid (Vitamin C) 500 MG CAPS Take by mouth      aspirin 81 mg chewable tablet Chew 81 mg daily      calcium carbonate (OS-HUMBERTO) 600 MG tablet Take 600 mg by mouth 2 (two) times a day with meals 500mg BID      doxycycline hyclate (VIBRA-TABS) 100 mg tablet Take 1 tablet (100 mg total) by mouth 2 (two) times a day for 7 days 14 tablet 0    hydrochlorothiazide (HYDRODIURIL) 12 5 mg tablet Take 1 tablet (12 5 mg total) by mouth daily 90 tablet 1    levothyroxine 25 mcg tablet Take 1 tablet (25 mcg total) by mouth daily 90 tablet 1    Lutein 10 MG TABS Take by mouth      metoprolol tartrate (LOPRESSOR) 50 mg tablet Take 1 tablet (50 mg total) by mouth 2 (two) times a day 180 tablet 1    Multiple Vitamins-Minerals (multivitamin with minerals) tablet Take 1 tablet by mouth daily      omega-3-acid ethyl esters (LOVAZA) 1 g capsule Take 2 g by mouth 2 (two) times a day      zinc gluconate 50 mg tablet Take 50 mg by mouth daily       No current facility-administered medications for this visit      _______________________________________________________________________  Review of Systems   Constitutional: Negative for chills and fever  HENT: Negative for congestion, ear pain, nosebleeds, sinus pain, sore throat and trouble swallowing  Eyes: Negative for discharge, redness and visual disturbance  Respiratory: Negative for cough, chest tightness and shortness of breath  Cardiovascular: Negative for chest pain and palpitations  Gastrointestinal: Negative for abdominal pain, blood in stool, constipation, diarrhea, nausea and vomiting  Genitourinary: Negative for dysuria, flank pain and hematuria  Musculoskeletal: Negative for arthralgias and neck pain  Skin: Positive for wound ( has some abrasions  right knee and right thigh area and sutures in her right thumb )  Negative for color change and rash  Neurological: Negative for dizziness, speech difficulty, weakness and headaches  Hematological: Does not bruise/bleed easily  Psychiatric/Behavioral: Negative for agitation and behavioral problems  Objective:  Vitals:    09/15/21 0842   BP: 128/82   BP Location: Right arm   Patient Position: Sitting   Cuff Size: Adult   Pulse: 80   Temp: 98 3 °F (36 8 °C)   TempSrc: Tympanic   SpO2: 97%     There is no height or weight on file to calculate BMI  Physical Exam  Vitals and nursing note reviewed  Constitutional:       General: She is not in acute distress  Appearance: Normal appearance  HENT:      Head: Normocephalic  Comments: Has hematoma left frontal area  Right Ear: Ear canal and external ear normal       Left Ear: Ear canal and external ear normal       Nose: Nose normal       Mouth/Throat:      Mouth: Mucous membranes are moist    Eyes:      General: No scleral icterus  Extraocular Movements: Extraocular movements intact  Conjunctiva/sclera: Conjunctivae normal    Cardiovascular:      Rate and Rhythm: Normal rate and regular rhythm  Pulses: Normal pulses  Pulmonary:      Effort: Pulmonary effort is normal       Breath sounds: Normal breath sounds  Abdominal:      General: Bowel sounds are normal       Palpations: Abdomen is soft  Tenderness: There is no abdominal tenderness  Musculoskeletal:         General: Normal range of motion  Cervical back: Normal range of motion and neck supple  No muscular tenderness  Right lower leg: No edema  Left lower leg: No edema  Skin:     General: Skin is warm  Findings: No rash  Comments: Has sutures intact right thumb area with mild erythema  right distal phalanx area  No discharge  Neurological:      General: No focal deficit present  Mental Status: She is alert and oriented to person, place, and time     Psychiatric:         Mood and Affect: Mood normal          Behavior: Behavior normal

## 2021-10-16 ENCOUNTER — APPOINTMENT (OUTPATIENT)
Dept: LAB | Facility: HOSPITAL | Age: 86
End: 2021-10-16
Attending: INTERNAL MEDICINE
Payer: MEDICARE

## 2021-10-16 DIAGNOSIS — R60.0 EDEMA OF BOTH LOWER LEGS: Chronic | ICD-10-CM

## 2021-10-16 DIAGNOSIS — R20.2 NUMBNESS AND TINGLING: ICD-10-CM

## 2021-10-16 DIAGNOSIS — E03.9 ACQUIRED HYPOTHYROIDISM: ICD-10-CM

## 2021-10-16 DIAGNOSIS — I10 ESSENTIAL HYPERTENSION: Chronic | ICD-10-CM

## 2021-10-16 DIAGNOSIS — R20.0 NUMBNESS AND TINGLING: ICD-10-CM

## 2021-10-16 LAB
ANION GAP SERPL CALCULATED.3IONS-SCNC: 7 MMOL/L (ref 4–13)
BUN SERPL-MCNC: 23 MG/DL (ref 6–20)
CALCIUM SERPL-MCNC: 10.2 MG/DL (ref 8.4–10.2)
CHLORIDE SERPL-SCNC: 104 MMOL/L (ref 96–108)
CO2 SERPL-SCNC: 28 MMOL/L (ref 22–33)
CREAT SERPL-MCNC: 1.23 MG/DL (ref 0.4–1.1)
FOLATE SERPL-MCNC: >20 NG/ML (ref 3.1–17.5)
GFR SERPL CREATININE-BSD FRML MDRD: 37 ML/MIN/1.73SQ M
GLUCOSE P FAST SERPL-MCNC: 94 MG/DL (ref 70–105)
POTASSIUM SERPL-SCNC: 4.5 MMOL/L (ref 3.5–5)
SODIUM SERPL-SCNC: 139 MMOL/L (ref 133–145)
TSH SERPL DL<=0.05 MIU/L-ACNC: 5.43 UIU/ML (ref 0.34–5.6)
VIT B12 SERPL-MCNC: 4694 PG/ML (ref 100–900)

## 2021-10-16 PROCEDURE — 84443 ASSAY THYROID STIM HORMONE: CPT

## 2021-10-16 PROCEDURE — 82607 VITAMIN B-12: CPT

## 2021-10-16 PROCEDURE — 82746 ASSAY OF FOLIC ACID SERUM: CPT

## 2021-10-16 PROCEDURE — 80048 BASIC METABOLIC PNL TOTAL CA: CPT

## 2021-10-16 PROCEDURE — 36415 COLL VENOUS BLD VENIPUNCTURE: CPT

## 2021-10-21 DIAGNOSIS — I10 ESSENTIAL HYPERTENSION: Chronic | ICD-10-CM

## 2021-10-21 RX ORDER — METOPROLOL TARTRATE 50 MG/1
TABLET, FILM COATED ORAL
Qty: 180 TABLET | Refills: 0 | Status: SHIPPED | OUTPATIENT
Start: 2021-10-21 | End: 2021-10-22 | Stop reason: SDUPTHER

## 2021-10-21 RX ORDER — HYDROCHLOROTHIAZIDE 12.5 MG/1
TABLET ORAL
Qty: 90 TABLET | Refills: 0 | Status: SHIPPED | OUTPATIENT
Start: 2021-10-21 | End: 2021-10-22 | Stop reason: SDUPTHER

## 2021-10-22 ENCOUNTER — OFFICE VISIT (OUTPATIENT)
Dept: INTERNAL MEDICINE CLINIC | Facility: CLINIC | Age: 86
End: 2021-10-22
Payer: MEDICARE

## 2021-10-22 VITALS
BODY MASS INDEX: 28.25 KG/M2 | SYSTOLIC BLOOD PRESSURE: 124 MMHG | HEIGHT: 67 IN | HEART RATE: 71 BPM | OXYGEN SATURATION: 97 % | TEMPERATURE: 98.4 F | DIASTOLIC BLOOD PRESSURE: 82 MMHG | WEIGHT: 180 LBS

## 2021-10-22 DIAGNOSIS — R10.32 ABDOMINAL PAIN, LLQ: ICD-10-CM

## 2021-10-22 DIAGNOSIS — E03.9 ACQUIRED HYPOTHYROIDISM: ICD-10-CM

## 2021-10-22 DIAGNOSIS — R20.0 NUMBNESS AND TINGLING: ICD-10-CM

## 2021-10-22 DIAGNOSIS — R60.0 EDEMA OF BOTH LOWER LEGS: Chronic | ICD-10-CM

## 2021-10-22 DIAGNOSIS — R20.2 NUMBNESS AND TINGLING: ICD-10-CM

## 2021-10-22 DIAGNOSIS — H35.30 MACULAR DEGENERATION OF BOTH EYES, UNSPECIFIED TYPE: Chronic | ICD-10-CM

## 2021-10-22 DIAGNOSIS — I10 ESSENTIAL HYPERTENSION: Primary | Chronic | ICD-10-CM

## 2021-10-22 DIAGNOSIS — I35.8 HEART MURMUR, AORTIC: ICD-10-CM

## 2021-10-22 DIAGNOSIS — N18.32 STAGE 3B CHRONIC KIDNEY DISEASE (HCC): Chronic | ICD-10-CM

## 2021-10-22 PROCEDURE — 99214 OFFICE O/P EST MOD 30 MIN: CPT | Performed by: INTERNAL MEDICINE

## 2021-10-22 RX ORDER — METOPROLOL TARTRATE 50 MG/1
50 TABLET, FILM COATED ORAL 2 TIMES DAILY
Qty: 180 TABLET | Refills: 1 | Status: SHIPPED | OUTPATIENT
Start: 2021-10-22 | End: 2022-02-14 | Stop reason: SDUPTHER

## 2021-10-22 RX ORDER — LEVOTHYROXINE SODIUM 0.03 MG/1
25 TABLET ORAL DAILY
Qty: 90 TABLET | Refills: 1 | Status: SHIPPED | OUTPATIENT
Start: 2021-10-22 | End: 2022-04-22

## 2021-10-22 RX ORDER — HYDROCHLOROTHIAZIDE 12.5 MG/1
12.5 TABLET ORAL DAILY
Qty: 90 TABLET | Refills: 1 | Status: SHIPPED | OUTPATIENT
Start: 2021-10-22

## 2021-12-29 ENCOUNTER — TELEMEDICINE (OUTPATIENT)
Dept: INTERNAL MEDICINE CLINIC | Facility: CLINIC | Age: 86
End: 2021-12-29
Payer: MEDICARE

## 2021-12-29 ENCOUNTER — TELEPHONE (OUTPATIENT)
Dept: INTERNAL MEDICINE CLINIC | Facility: CLINIC | Age: 86
End: 2021-12-29

## 2021-12-29 DIAGNOSIS — I10 ESSENTIAL HYPERTENSION: Chronic | ICD-10-CM

## 2021-12-29 DIAGNOSIS — R11.0 NAUSEA: ICD-10-CM

## 2021-12-29 DIAGNOSIS — R51.9 SINUS HEADACHE: Primary | ICD-10-CM

## 2021-12-29 DIAGNOSIS — R06.02 SOB (SHORTNESS OF BREATH): ICD-10-CM

## 2021-12-29 PROCEDURE — 99213 OFFICE O/P EST LOW 20 MIN: CPT | Performed by: INTERNAL MEDICINE

## 2021-12-29 RX ORDER — CEFUROXIME AXETIL 250 MG/1
250 TABLET ORAL EVERY 12 HOURS SCHEDULED
Qty: 20 TABLET | Refills: 0 | Status: SHIPPED | OUTPATIENT
Start: 2021-12-29 | End: 2022-01-08

## 2022-01-21 ENCOUNTER — OFFICE VISIT (OUTPATIENT)
Dept: INTERNAL MEDICINE CLINIC | Facility: CLINIC | Age: 87
End: 2022-01-21
Payer: MEDICARE

## 2022-01-21 VITALS
TEMPERATURE: 98.5 F | DIASTOLIC BLOOD PRESSURE: 82 MMHG | HEART RATE: 72 BPM | SYSTOLIC BLOOD PRESSURE: 126 MMHG | OXYGEN SATURATION: 97 %

## 2022-01-21 DIAGNOSIS — H35.30 MACULAR DEGENERATION OF BOTH EYES, UNSPECIFIED TYPE: Chronic | ICD-10-CM

## 2022-01-21 DIAGNOSIS — I35.8 HEART MURMUR, AORTIC: ICD-10-CM

## 2022-01-21 DIAGNOSIS — R51.9 NONINTRACTABLE HEADACHE, UNSPECIFIED CHRONICITY PATTERN, UNSPECIFIED HEADACHE TYPE: ICD-10-CM

## 2022-01-21 DIAGNOSIS — E03.9 ACQUIRED HYPOTHYROIDISM: ICD-10-CM

## 2022-01-21 DIAGNOSIS — D72.829 LEUKOCYTOSIS, UNSPECIFIED TYPE: ICD-10-CM

## 2022-01-21 DIAGNOSIS — N18.32 STAGE 3B CHRONIC KIDNEY DISEASE (HCC): Chronic | ICD-10-CM

## 2022-01-21 DIAGNOSIS — R60.0 EDEMA OF BOTH LOWER LEGS: Chronic | ICD-10-CM

## 2022-01-21 DIAGNOSIS — I10 ESSENTIAL HYPERTENSION: Primary | Chronic | ICD-10-CM

## 2022-01-21 PROCEDURE — 99214 OFFICE O/P EST MOD 30 MIN: CPT | Performed by: INTERNAL MEDICINE

## 2022-01-21 NOTE — PATIENT INSTRUCTIONS
Patient was advised to continue present medications  discussed with the patient medications and laboratory data in detail  Follow-up with me in 3 months or as advised  If any blood test was ordered please do 1 week prior to next appointment unless advise to get earlier    If you have any questions please call the office [phone]

## 2022-01-21 NOTE — ASSESSMENT & PLAN NOTE
Patient has been followed by her ophthalmologist   She is getting a injection right eye every 3 months

## 2022-01-21 NOTE — PROGRESS NOTES
Assessment/Plan:    1  Essential hypertension  Assessment & Plan:  Blood pressure well controlled  Advised to continue present medication  Advised for low-salt diet  Orders:  -     Basic metabolic panel; Future    2  Acquired hypothyroidism  Assessment & Plan:  Last TSH 5 4 advised to continue same dose of levothyroxine and will follow TSH    Orders:  -     TSH, 3rd generation; Future    3  BMI 28 0-28 9,adult  Assessment & Plan:  Patient  was advised to decrease portion size  Advised to decrease carb, sugar, cholesterol intake  Advised to exercise 3-5 times per week as much as she can  Patient state used to go to [facility]     Advised to lose weight  4  Stage 3b chronic kidney disease [facility])  Assessment & Plan:  Lab Results   Component Value Date    EGFR 37 10/16/2021    EGFR 43 07/17/2021    EGFR 37 01/16/2021    CREATININE 1 23 (H) 10/16/2021    CREATININE 1 09 07/17/2021    CREATININE 1 25 (H) 01/16/2021   Stable  Advised to maintain hydration will follow renal function  Orders:  -     Basic metabolic panel; Future    5  Edema of both lower legs  Assessment & Plan:  Most likely dependent  Discussed with the patient to change hydrochlorothiazide to furosemide but she does not want to change the diuretic  Advised to wear elastic stockings keep legs elevated and low-salt diet  6  Nonintractable headache, unspecified chronicity pattern, unspecified headache type  Assessment & Plan:  Sometime on right side of the head, on temporal parietal area without any associated focal weakness numbness or any difficulty walking  No new visual changes except she has macular degeneration  Discussed with the patient about getting a CT scan head but she does not want  Patient states headache release by itself  Denies any nasal discharge or sinus pressure  Will observe  Patient agreed      7   Macular degeneration of both eyes, unspecified type  Assessment & Plan:  Patient has been followed by her ophthalmologist   She is getting a injection right eye every 3 months  8  Leukocytosis, unspecified type  Assessment & Plan:  Last time her WBC was 13 9 with elevated hemoglobin  No signs of infection  Will observe and follow CBC  Orders:  -     CBC and differential; Future    9  Heart murmur, aortic  Assessment & Plan:  Presently asymptomatic  Does not want to  see cardiologist     Patient advised to get a COVID-19 booster dose  Subjective:  Patient presents for follow-up of her medical problems  She denies any chest pain, shortness of breath, pain in abdomen  Denies any cough, fever, chills  She gets headache on the right side of head, on and off  Not associated with any focal weakness numbness  No new visual symptoms except she has a macular degeneration  Denies any fever or chills  Denies any injury  Patient ID: Houston Lutz is a 80 y o  female      HPI       The following portions of the patient's history were reviewed and updated as appropriate:     Past Medical History:  She has a past medical history of Abdominal pain, LLQ (10/22/2021), Age related osteoporosis (01/22/2021), BMI 28 0-28 9,adult (01/22/2021), CKD (chronic kidney disease), Colonoscopy refused, Dizziness, DJD (degenerative joint disease), Edema of both lower legs (01/22/2021), Essential hypertension (01/22/2021), GE reflux, General weakness, Heart murmur, aortic, Heart murmur, aortic (10/22/2021), Hematoma of scalp (09/15/2021), History of breast cancer, Hypercalcemia, Hyperkalemia (01/22/2021), Hypertension, Hypothyroidism (07/23/2021), Laceration of right thumb (09/15/2021), Leukocytosis (1/21/2022), Macular degeneration, Nausea (12/29/2021), Numbness and tingling (07/23/2021), Osteoporosis, Overweight, Primary osteoarthritis (01/22/2021), Sinus headache (12/29/2021), Skin lesion, SOB (shortness of breath) (12/29/2021), and Stasis edema of both lower extremities  ,  _______________________________________________________________________  Past Surgical History:   has a past surgical history that includes Breast surgery; Squamous cell carcinoma excision; Cholecystectomy; Total hip arthroplasty (Bilateral); Total shoulder replacement (Right); Abdominal hysterectomy; Mastectomy (Left); Colonoscopy (07/18/2011); DXA procedure(historical) (01/29/2014); Mammo (historical) (10/05/2020); IR kyphoplasty/vertebroplasty; and [facility] CKD PROGRAM (HISTORICAL)  ,  _______________________________________________________________________  Family History:  family history includes Lung cancer in her father ,  _______________________________________________________________________  Social History:   reports that she has never smoked  She has never used smokeless tobacco  She reports previous alcohol use  She reports that she does not use drugs  ,  _______________________________________________________________________  Allergies:  is allergic to actonel [risedronate sodium], amlodipine, and orudis [ketoprofen]     _______________________________________________________________________  Current Outpatient Medications   Medication Sig Dispense Refill    Ascorbic Acid (Vitamin C) 500 MG CAPS Take by mouth      aspirin 81 mg chewable tablet Chew 81 mg daily      calcium carbonate (OS-HUMBERTO) 600 MG tablet Take 600 mg by mouth 2 (two) times a day with meals 500mg BID      hydrochlorothiazide (HYDRODIURIL) 12 5 mg tablet Take 1 tablet (12 5 mg total) by mouth daily 90 tablet 1    levothyroxine 25 mcg tablet Take 1 tablet (25 mcg total) by mouth daily 90 tablet 1    Lutein 10 MG TABS Take by mouth      metoprolol tartrate (LOPRESSOR) 50 mg tablet Take 1 tablet (50 mg total) by mouth 2 (two) times a day 180 tablet 1    Multiple Vitamins-Minerals (multivitamin with minerals) tablet Take 1 tablet by mouth daily      omega-3-acid ethyl esters (LOVAZA) 1 g capsule Take 2 g by mouth 2 (two) times a day      zinc gluconate 50 mg tablet Take 50 mg by mouth daily       No current facility-administered medications for this visit      _______________________________________________________________________  Review of Systems   Constitutional: Negative for chills and fever  HENT: Negative for congestion, ear pain, hearing loss, nosebleeds, sinus pain, sore throat and trouble swallowing  Eyes: Negative for discharge, redness and visual disturbance  Respiratory: Negative for cough, chest tightness and shortness of breath  Cardiovascular: Negative for chest pain and palpitations  Gastrointestinal: Negative for abdominal pain, blood in stool, constipation, diarrhea, nausea and vomiting  Genitourinary: Negative for dysuria, flank pain and hematuria  Musculoskeletal: Negative for arthralgias, myalgias and neck pain  Skin: Negative for color change and rash  Neurological: Positive for headaches  Negative for dizziness, syncope, facial asymmetry, speech difficulty, weakness and numbness  Hematological: Does not bruise/bleed easily  Psychiatric/Behavioral: Negative for agitation and behavioral problems  Objective:  Vitals:    01/21/22 0856   BP: 126/82   BP Location: Left arm   Patient Position: Sitting   Cuff Size: Adult   Pulse: 72   Temp: 98 5 °F (36 9 °C)   TempSrc: Tympanic   SpO2: 97%     There is no height or weight on file to calculate BMI  Physical Exam  Vitals and nursing note reviewed  Constitutional:       General: She is not in acute distress  Appearance: Normal appearance  She is normal weight  HENT:      Head: Normocephalic and atraumatic  Right Ear: Ear canal and external ear normal       Left Ear: Ear canal and external ear normal       Nose:      Comments: Patient has a face mask on     Mouth/Throat:      Comments: Patient has a face mask on  Eyes:      General: No scleral icterus  Right eye: No discharge  Left eye: No discharge  Extraocular Movements: Extraocular movements intact  Conjunctiva/sclera: Conjunctivae normal    Cardiovascular:      Rate and Rhythm: Normal rate and regular rhythm  Pulses: Normal pulses  Heart sounds: Murmur heard  Pulmonary:      Effort: Pulmonary effort is normal  No respiratory distress  Breath sounds: Normal breath sounds  Abdominal:      General: Bowel sounds are normal       Palpations: Abdomen is soft  Tenderness: There is no abdominal tenderness  Musculoskeletal:         General: Normal range of motion  Cervical back: Normal range of motion and neck supple  Right lower leg: Edema (Trace pedal edema to 1+) present  Left lower leg: Edema ( trace pedal edema to 1+) present  Skin:     General: Skin is warm  Findings: No rash  Neurological:      General: No focal deficit present  Mental Status: She is alert and oriented to person, place, and time  Motor: No weakness  Coordination: Coordination normal    Psychiatric:         Mood and Affect: Mood normal          Behavior: Behavior normal          I spent 30 minutes with the patient today    More than 50% time spent for reviewing of external notes, reviewing of the results of diagnostics test, management of care, patient education and ordering of test

## 2022-01-21 NOTE — ASSESSMENT & PLAN NOTE
Sometime on right side of the head, on temporal parietal area without any associated focal weakness numbness or any difficulty walking  No new visual changes except she has macular degeneration  Discussed with the patient about getting a CT scan head but she does not want  Patient states headache release by itself  Denies any nasal discharge or sinus pressure  Will observe    Patient agreed

## 2022-01-21 NOTE — ASSESSMENT & PLAN NOTE
Last time her WBC was 13 9 with elevated hemoglobin  No signs of infection  Will observe and follow CBC

## 2022-01-21 NOTE — ASSESSMENT & PLAN NOTE
Most likely dependent  Discussed with the patient to change hydrochlorothiazide to furosemide but she does not want to change the diuretic  Advised to wear elastic stockings keep legs elevated and low-salt diet

## 2022-01-21 NOTE — ASSESSMENT & PLAN NOTE
Lab Results   Component Value Date    EGFR 37 10/16/2021    EGFR 43 07/17/2021    EGFR 37 01/16/2021    CREATININE 1 23 (H) 10/16/2021    CREATININE 1 09 07/17/2021    CREATININE 1 25 (H) 01/16/2021   Stable  Advised to maintain hydration will follow renal function

## 2022-01-21 NOTE — ASSESSMENT & PLAN NOTE
Patient  was advised to decrease portion size  Advised to decrease carb, sugar, cholesterol intake  Advised to exercise 3-5 times per week as much as she can  Patient state used to go to [facility]     Advised to lose weight

## 2022-02-14 ENCOUNTER — TELEPHONE (OUTPATIENT)
Dept: INTERNAL MEDICINE CLINIC | Facility: CLINIC | Age: 87
End: 2022-02-14

## 2022-02-14 DIAGNOSIS — I10 ESSENTIAL HYPERTENSION: Chronic | ICD-10-CM

## 2022-02-14 RX ORDER — METOPROLOL TARTRATE 50 MG/1
50 TABLET, FILM COATED ORAL 2 TIMES DAILY
Qty: 180 TABLET | Refills: 1 | Status: SHIPPED | OUTPATIENT
Start: 2022-02-14 | End: 2022-02-14 | Stop reason: SDUPTHER

## 2022-02-14 RX ORDER — METOPROLOL TARTRATE 50 MG/1
50 TABLET, FILM COATED ORAL 2 TIMES DAILY
Qty: 180 TABLET | Refills: 1 | Status: SHIPPED | OUTPATIENT
Start: 2022-02-14 | End: 2022-04-22 | Stop reason: SDUPTHER

## 2022-02-14 NOTE — TELEPHONE ENCOUNTER
metoprolol tartrate (LOPRESSOR) 50 mg tablet   BID #180    Send to [facility] on [address]

## 2022-02-14 NOTE — TELEPHONE ENCOUNTER
I sent prescription
Send refill request for metoprolol tartrate 50 mg to [facility] on [address] per phone call message from Shadi Voss
HSQ    ADMIT TO F

## 2022-04-09 ENCOUNTER — APPOINTMENT (OUTPATIENT)
Dept: LAB | Facility: HOSPITAL | Age: 87
End: 2022-04-09
Attending: INTERNAL MEDICINE
Payer: MEDICARE

## 2022-04-09 DIAGNOSIS — N18.32 STAGE 3B CHRONIC KIDNEY DISEASE (HCC): Chronic | ICD-10-CM

## 2022-04-09 DIAGNOSIS — I10 ESSENTIAL HYPERTENSION: Chronic | ICD-10-CM

## 2022-04-09 DIAGNOSIS — D72.829 LEUKOCYTOSIS, UNSPECIFIED TYPE: ICD-10-CM

## 2022-04-09 DIAGNOSIS — E03.9 ACQUIRED HYPOTHYROIDISM: ICD-10-CM

## 2022-04-09 LAB
ANION GAP SERPL CALCULATED.3IONS-SCNC: 7 MMOL/L (ref 4–13)
BASOPHILS # BLD AUTO: 0.08 THOUSANDS/ΜL (ref 0–0.1)
BASOPHILS NFR BLD AUTO: 1 % (ref 0–1)
BUN SERPL-MCNC: 22 MG/DL (ref 5–25)
CALCIUM SERPL-MCNC: 10.4 MG/DL (ref 8.4–10.2)
CHLORIDE SERPL-SCNC: 102 MMOL/L (ref 96–108)
CO2 SERPL-SCNC: 29 MMOL/L (ref 21–32)
CREAT SERPL-MCNC: 1.25 MG/DL (ref 0.6–1.3)
EOSINOPHIL # BLD AUTO: 0.47 THOUSAND/ΜL (ref 0–0.61)
EOSINOPHIL NFR BLD AUTO: 5 % (ref 0–6)
ERYTHROCYTE [DISTWIDTH] IN BLOOD BY AUTOMATED COUNT: 14.2 % (ref 11.6–15.1)
GFR SERPL CREATININE-BSD FRML MDRD: 36 ML/MIN/1.73SQ M
GLUCOSE P FAST SERPL-MCNC: 88 MG/DL (ref 65–99)
HCT VFR BLD AUTO: 44.6 % (ref 34.8–46.1)
HGB BLD-MCNC: 15.1 G/DL (ref 11.5–15.4)
IMM GRANULOCYTES # BLD AUTO: 0.03 THOUSAND/UL (ref 0–0.2)
IMM GRANULOCYTES NFR BLD AUTO: 0 % (ref 0–2)
LYMPHOCYTES # BLD AUTO: 2.65 THOUSANDS/ΜL (ref 0.6–4.47)
LYMPHOCYTES NFR BLD AUTO: 28 % (ref 14–44)
MCH RBC QN AUTO: 31.1 PG (ref 26.8–34.3)
MCHC RBC AUTO-ENTMCNC: 33.9 G/DL (ref 31.4–37.4)
MCV RBC AUTO: 92 FL (ref 82–98)
MONOCYTES # BLD AUTO: 0.82 THOUSAND/ΜL (ref 0.17–1.22)
MONOCYTES NFR BLD AUTO: 9 % (ref 4–12)
NEUTROPHILS # BLD AUTO: 5.46 THOUSANDS/ΜL (ref 1.85–7.62)
NEUTS SEG NFR BLD AUTO: 57 % (ref 43–75)
NRBC BLD AUTO-RTO: 0 /100 WBCS
PLATELET # BLD AUTO: 265 THOUSANDS/UL (ref 149–390)
PMV BLD AUTO: 9.6 FL (ref 8.9–12.7)
POTASSIUM SERPL-SCNC: 4.1 MMOL/L (ref 3.5–5.3)
RBC # BLD AUTO: 4.85 MILLION/UL (ref 3.81–5.12)
SODIUM SERPL-SCNC: 138 MMOL/L (ref 135–147)
TSH SERPL DL<=0.05 MIU/L-ACNC: 4.66 UIU/ML (ref 0.45–4.5)
WBC # BLD AUTO: 9.51 THOUSAND/UL (ref 4.31–10.16)

## 2022-04-09 PROCEDURE — 36415 COLL VENOUS BLD VENIPUNCTURE: CPT

## 2022-04-09 PROCEDURE — 80048 BASIC METABOLIC PNL TOTAL CA: CPT

## 2022-04-09 PROCEDURE — 85025 COMPLETE CBC W/AUTO DIFF WBC: CPT

## 2022-04-09 PROCEDURE — 84443 ASSAY THYROID STIM HORMONE: CPT

## 2022-04-22 ENCOUNTER — OFFICE VISIT (OUTPATIENT)
Dept: INTERNAL MEDICINE CLINIC | Facility: CLINIC | Age: 87
End: 2022-04-22
Payer: MEDICARE

## 2022-04-22 VITALS
SYSTOLIC BLOOD PRESSURE: 126 MMHG | DIASTOLIC BLOOD PRESSURE: 78 MMHG | HEART RATE: 70 BPM | TEMPERATURE: 98.7 F | OXYGEN SATURATION: 97 % | RESPIRATION RATE: 12 BRPM

## 2022-04-22 DIAGNOSIS — M19.012 PRIMARY OSTEOARTHRITIS OF LEFT SHOULDER: Chronic | ICD-10-CM

## 2022-04-22 DIAGNOSIS — E83.52 HYPERCALCEMIA: Chronic | ICD-10-CM

## 2022-04-22 DIAGNOSIS — J00 ACUTE RHINITIS: ICD-10-CM

## 2022-04-22 DIAGNOSIS — J02.9 ACUTE PHARYNGITIS, UNSPECIFIED ETIOLOGY: ICD-10-CM

## 2022-04-22 DIAGNOSIS — C50.012 MALIGNANT NEOPLASM OF NIPPLE OF LEFT BREAST IN FEMALE, UNSPECIFIED ESTROGEN RECEPTOR STATUS (HCC): ICD-10-CM

## 2022-04-22 DIAGNOSIS — E03.9 ACQUIRED HYPOTHYROIDISM: ICD-10-CM

## 2022-04-22 DIAGNOSIS — H35.30 MACULAR DEGENERATION OF BOTH EYES, UNSPECIFIED TYPE: Chronic | ICD-10-CM

## 2022-04-22 DIAGNOSIS — I10 ESSENTIAL HYPERTENSION: Primary | Chronic | ICD-10-CM

## 2022-04-22 DIAGNOSIS — N18.32 STAGE 3B CHRONIC KIDNEY DISEASE (HCC): Chronic | ICD-10-CM

## 2022-04-22 DIAGNOSIS — R60.0 EDEMA OF BOTH LOWER LEGS: Chronic | ICD-10-CM

## 2022-04-22 PROBLEM — J06.9 URI (UPPER RESPIRATORY INFECTION): Status: ACTIVE | Noted: 2022-04-22

## 2022-04-22 PROCEDURE — 99214 OFFICE O/P EST MOD 30 MIN: CPT | Performed by: INTERNAL MEDICINE

## 2022-04-22 RX ORDER — AMOXICILLIN 500 MG/1
500 CAPSULE ORAL EVERY 8 HOURS SCHEDULED
Qty: 21 CAPSULE | Refills: 0 | Status: SHIPPED | OUTPATIENT
Start: 2022-04-22 | End: 2022-04-29

## 2022-04-22 RX ORDER — METOPROLOL TARTRATE 50 MG/1
50 TABLET, FILM COATED ORAL 2 TIMES DAILY
Qty: 180 TABLET | Refills: 1 | Status: SHIPPED | OUTPATIENT
Start: 2022-04-22

## 2022-04-22 RX ORDER — LEVOTHYROXINE SODIUM 0.03 MG/1
TABLET ORAL
Qty: 90 TABLET | Refills: 0 | Status: SHIPPED | OUTPATIENT
Start: 2022-04-22 | End: 2022-07-25

## 2022-04-22 NOTE — PROGRESS NOTES
Assessment/Plan:    1  Essential hypertension  Assessment & Plan:  Blood pressure well controlled  Advised to continue present medication  Advised for low-salt diet  Orders:  -     metoprolol tartrate (LOPRESSOR) 50 mg tablet; Take 1 tablet (50 mg total) by mouth 2 (two) times a day    2  Acquired hypothyroidism  Assessment & Plan:  Tsh 4 6 slightly elevated will observe on same dose of Levothyroxine      3  Stage 3b chronic kidney disease [facility])  Assessment & Plan:  Lab Results   Component Value Date    EGFR 36 04/09/2022    EGFR 37 10/16/2021    EGFR 43 07/17/2021    CREATININE 1 25 04/09/2022    CREATININE 1 23 (H) 10/16/2021    CREATININE 1 09 07/17/2021   Renal function stable  Advised for to maintain hydration      4  Hypercalcemia  Assessment & Plan:  Calcium 10 4 so advised to decrease calcium intake 600 mg b i d  to once a day  5  BMI 28 0-28 9,adult  Assessment & Plan:  Patient  was advised to decrease portion size  Advised to decrease carb, sugar, cholesterol intake  Advised to exercise 3-5 times per week  Advised to lose weight  6  Macular degeneration of both eyes, unspecified type  Assessment & Plan:  Patient has been followed by ophthalmologist   Patient is not driving  7  Edema of both lower legs  Assessment & Plan:  Better and resolved after being on HCTZ  Advised to keep legs elevated  8  Malignant neoplasm of nipple of left breast in female, unspecified estrogen receptor status ([facility] [address] )  Assessment & Plan:  Her last mammogram was unremarkable on April 22, 2022       9  Acute pharyngitis, unspecified etiology  Assessment & Plan: Will start amoxicillin and advised for salt water gargle  Orders:  -     amoxicillin (AMOXIL) 500 mg capsule; Take 1 capsule (500 mg total) by mouth every 8 (eight) hours for 7 days    10  Acute rhinitis  Assessment & Plan:  Advised to take Zyrtec 10 mg p o  q day p r n        11  Primary osteoarthritis of left shoulder  Assessment & Plan:  Most likely cause of her left shoulder pain advised to take acetaminophen 500 mg tablet 1 p o  q 8 hours p r n  for pain  Subjective:  Patient presents for follow-up  Patient ID: Freddie Javed is a 80 y o  female  HPI   40-year-old white female patient presents for follow-up her medical problems  Patient complain of sore throat and sinus congestion sinus drip  Denies any cough, fever, chills  Denies any nausea, vomiting, diarrhea  Denies any chest pain or shortness of breath  Denies any fever or chills  The following portions of the patient's history were reviewed and updated as appropriate:     Past Medical History:  She has a past medical history of Abdominal pain, LLQ (10/22/2021), Acute rhinitis (4/22/2022), Age related osteoporosis (01/22/2021), BMI 28 0-28 9,adult (01/22/2021), CKD (chronic kidney disease), Colonoscopy refused, Dizziness, DJD (degenerative joint disease), Edema of both lower legs (01/22/2021), Essential hypertension (01/22/2021), GE reflux, General weakness, Heart murmur, aortic, Heart murmur, aortic (10/22/2021), Hematoma of scalp (09/15/2021), History of breast cancer, Hypercalcemia, Hyperkalemia (01/22/2021), Hypertension, Hypothyroidism (07/23/2021), Laceration of right thumb (09/15/2021), Leukocytosis (1/21/2022), Macular degeneration, Nausea (12/29/2021), Numbness and tingling (07/23/2021), Osteoporosis, Overweight, Primary osteoarthritis (01/22/2021), Sinus headache (12/29/2021), Skin lesion, SOB (shortness of breath) (12/29/2021), Stasis edema of both lower extremities, and URI (upper respiratory infection) (4/22/2022)  ,  _______________________________________________________________________  Past Surgical History:   has a past surgical history that includes Breast surgery; Squamous cell carcinoma excision; Cholecystectomy; Total hip arthroplasty (Bilateral); Total shoulder replacement (Right); Abdominal hysterectomy; Mastectomy (Left);  Colonoscopy (07/18/2011); DXA procedure(historical) (01/29/2014); Mammo (historical) (10/05/2020); IR kyphoplasty/vertebroplasty; and LITHOLINK CKD PROGRAM (HISTORICAL)  ,  _______________________________________________________________________  Family History:  family history includes Lung cancer in her father ,  _______________________________________________________________________  Social History:   reports that she has never smoked  She has never used smokeless tobacco  She reports previous alcohol use  She reports that she does not use drugs  ,  _______________________________________________________________________  Allergies:  is allergic to actonel [risedronate sodium], amlodipine, and orudis [ketoprofen]     _______________________________________________________________________  Current Outpatient Medications   Medication Sig Dispense Refill    Ascorbic Acid (Vitamin C) 500 MG CAPS Take by mouth      aspirin 81 mg chewable tablet Chew 81 mg daily      calcium carbonate (OS-HUMBERTO) 600 MG tablet Take 600 mg by mouth 2 (two) times a day with meals 500mg BID      hydrochlorothiazide (HYDRODIURIL) 12 5 mg tablet Take 1 tablet (12 5 mg total) by mouth daily 90 tablet 1    levothyroxine 25 mcg tablet TAKE ONE TABLET BY MOUTH ONCE DAILY 90 tablet 0    Lutein 10 MG TABS Take by mouth      metoprolol tartrate (LOPRESSOR) 50 mg tablet Take 1 tablet (50 mg total) by mouth 2 (two) times a day 180 tablet 1    Multiple Vitamins-Minerals (multivitamin with minerals) tablet Take 1 tablet by mouth daily      omega-3-acid ethyl esters (LOVAZA) 1 g capsule Take 2 g by mouth 2 (two) times a day      zinc gluconate 50 mg tablet Take 50 mg by mouth daily      amoxicillin (AMOXIL) 500 mg capsule Take 1 capsule (500 mg total) by mouth every 8 (eight) hours for 7 days 21 capsule 0     No current facility-administered medications for this visit      _______________________________________________________________________  Review of Systems   Constitutional: Negative for chills and fever  HENT: Positive for congestion, rhinorrhea and sore throat  Negative for ear pain, hearing loss, nosebleeds, sinus pain and trouble swallowing  Eyes: Positive for visual disturbance ( due to macular degeneration)  Negative for discharge and redness  Respiratory: Negative for cough, chest tightness and shortness of breath  Cardiovascular: Negative for chest pain and palpitations  Gastrointestinal: Negative for abdominal pain, blood in stool, constipation, diarrhea, nausea and vomiting  Genitourinary: Negative for dysuria, flank pain, frequency and hematuria  Musculoskeletal: Positive for arthralgias ( sometimes gets pain in the left shoulder  No injury or fall  )  Negative for myalgias and neck pain  Skin: Negative for color change and rash  Neurological: Negative for dizziness, speech difficulty, weakness and headaches  Hematological: Does not bruise/bleed easily  Psychiatric/Behavioral: Negative for agitation and behavioral problems  Objective:  Vitals:    04/22/22 0851   BP: 126/78   BP Location: Right arm   Patient Position: Sitting   Cuff Size: Adult   Pulse: 70   Resp: 12   Temp: 98 7 °F (37 1 °C)   TempSrc: Tympanic   SpO2: 97%     There is no height or weight on file to calculate BMI  Physical Exam  Vitals and nursing note reviewed  Constitutional:       General: She is not in acute distress  Appearance: Normal appearance  HENT:      Head: Normocephalic and atraumatic  Right Ear: Ear canal and external ear normal       Left Ear: Ear canal and external ear normal       Nose: Rhinorrhea present  Mouth/Throat:      Mouth: Mucous membranes are moist       Pharynx: Posterior oropharyngeal erythema present  No oropharyngeal exudate  Eyes:      General: No scleral icterus  Right eye: No discharge  Left eye: No discharge  Extraocular Movements: Extraocular movements intact  Conjunctiva/sclera: Conjunctivae normal    Cardiovascular:      Rate and Rhythm: Normal rate and regular rhythm  Pulses: Normal pulses  Heart sounds: No murmur heard  Pulmonary:      Effort: Pulmonary effort is normal  No respiratory distress  Breath sounds: Normal breath sounds  Abdominal:      General: Bowel sounds are normal       Palpations: Abdomen is soft  Tenderness: There is no abdominal tenderness  Musculoskeletal:         General: Tenderness (Has some discomfort in the left shoulder when she lifts up  Vinny Guise ) present  Normal range of motion  Cervical back: Normal range of motion and neck supple  No muscular tenderness  Right lower leg: No edema  Left lower leg: No edema  Skin:     General: Skin is warm  Findings: No rash  Neurological:      General: No focal deficit present  Mental Status: She is alert and oriented to person, place, and time  Motor: No weakness  Coordination: Coordination normal    Psychiatric:         Mood and Affect: Mood normal          Behavior: Behavior normal          I spent 30 minutes with the patient today    More than 50% time spent for reviewing of external notes, reviewing of the results of diagnostics test, management of care, patient education and ordering of test

## 2022-04-22 NOTE — PATIENT INSTRUCTIONS
Patient was advised to continue present medications  discussed with the patient medications and laboratory data in detail  Follow-up with me in 3 months or as advised  If any blood test was ordered please do 1 week prior to next appointment unless advise to get earlier    If you have any questions please call the office [phone]

## 2022-04-23 NOTE — ASSESSMENT & PLAN NOTE
Most likely cause of her left shoulder pain advised to take acetaminophen 500 mg tablet 1 p o  q 8 hours p r n  for pain

## 2022-04-23 NOTE — ASSESSMENT & PLAN NOTE
Lab Results   Component Value Date    EGFR 36 04/09/2022    EGFR 37 10/16/2021    EGFR 43 07/17/2021    CREATININE 1 25 04/09/2022    CREATININE 1 23 (H) 10/16/2021    CREATININE 1 09 07/17/2021   Renal function stable    Advised for to maintain hydration

## 2022-06-30 ENCOUNTER — HOSPITAL ENCOUNTER (EMERGENCY)
Facility: HOSPITAL | Age: 87
Discharge: HOME | End: 2022-06-30
Attending: EMERGENCY MEDICINE
Payer: MEDICARE

## 2022-06-30 ENCOUNTER — APPOINTMENT (EMERGENCY)
Dept: CT IMAGING | Facility: HOSPITAL | Age: 87
End: 2022-06-30
Payer: MEDICARE

## 2022-06-30 ENCOUNTER — APPOINTMENT (EMERGENCY)
Dept: RADIOLOGY | Facility: HOSPITAL | Age: 87
End: 2022-06-30
Payer: MEDICARE

## 2022-06-30 VITALS
RESPIRATION RATE: 16 BRPM | HEART RATE: 56 BPM | BODY MASS INDEX: 29.49 KG/M2 | TEMPERATURE: 97.9 F | SYSTOLIC BLOOD PRESSURE: 189 MMHG | WEIGHT: 188.27 LBS | OXYGEN SATURATION: 97 % | DIASTOLIC BLOOD PRESSURE: 93 MMHG

## 2022-06-30 DIAGNOSIS — R91.1 PULMONARY NODULE: ICD-10-CM

## 2022-06-30 DIAGNOSIS — I10 HYPERTENSION: ICD-10-CM

## 2022-06-30 DIAGNOSIS — R07.89 ATYPICAL CHEST PAIN: Primary | ICD-10-CM

## 2022-06-30 LAB
2HR DELTA HS TROPONIN: 0 NG/L
ALBUMIN SERPL BCP-MCNC: 4 G/DL (ref 3.5–5)
ALP SERPL-CCNC: 81 U/L (ref 34–104)
ALT SERPL W P-5'-P-CCNC: 16 U/L (ref 7–52)
ANION GAP SERPL CALCULATED.3IONS-SCNC: 6 MMOL/L (ref 4–13)
AST SERPL W P-5'-P-CCNC: 22 U/L (ref 13–39)
ATRIAL RATE: 60 BPM
BASOPHILS # BLD AUTO: 0.12 THOUSANDS/ΜL (ref 0–0.1)
BASOPHILS NFR BLD AUTO: 1 % (ref 0–1)
BILIRUB SERPL-MCNC: 0.73 MG/DL (ref 0.2–1)
BUN SERPL-MCNC: 25 MG/DL (ref 5–25)
CALCIUM SERPL-MCNC: 10 MG/DL (ref 8.4–10.2)
CARDIAC TROPONIN I PNL SERPL HS: 6 NG/L
CARDIAC TROPONIN I PNL SERPL HS: 6 NG/L
CHLORIDE SERPL-SCNC: 104 MMOL/L (ref 96–108)
CO2 SERPL-SCNC: 28 MMOL/L (ref 21–32)
CREAT SERPL-MCNC: 1.04 MG/DL (ref 0.6–1.3)
D DIMER PPP FEU-MCNC: 1.13 UG/ML FEU
EOSINOPHIL # BLD AUTO: 0.54 THOUSAND/ΜL (ref 0–0.61)
EOSINOPHIL NFR BLD AUTO: 5 % (ref 0–6)
ERYTHROCYTE [DISTWIDTH] IN BLOOD BY AUTOMATED COUNT: 14.4 % (ref 11.6–15.1)
GFR SERPL CREATININE-BSD FRML MDRD: 45 ML/MIN/1.73SQ M
GLUCOSE SERPL-MCNC: 98 MG/DL (ref 65–140)
HCT VFR BLD AUTO: 43.9 % (ref 34.8–46.1)
HGB BLD-MCNC: 14.3 G/DL (ref 11.5–15.4)
IMM GRANULOCYTES # BLD AUTO: 0.05 THOUSAND/UL (ref 0–0.2)
IMM GRANULOCYTES NFR BLD AUTO: 1 % (ref 0–2)
LYMPHOCYTES # BLD AUTO: 2.06 THOUSANDS/ΜL (ref 0.6–4.47)
LYMPHOCYTES NFR BLD AUTO: 20 % (ref 14–44)
MCH RBC QN AUTO: 30.8 PG (ref 26.8–34.3)
MCHC RBC AUTO-ENTMCNC: 32.6 G/DL (ref 31.4–37.4)
MCV RBC AUTO: 94 FL (ref 82–98)
MONOCYTES # BLD AUTO: 0.7 THOUSAND/ΜL (ref 0.17–1.22)
MONOCYTES NFR BLD AUTO: 7 % (ref 4–12)
NEUTROPHILS # BLD AUTO: 6.72 THOUSANDS/ΜL (ref 1.85–7.62)
NEUTS SEG NFR BLD AUTO: 66 % (ref 43–75)
NRBC BLD AUTO-RTO: 0 /100 WBCS
P AXIS: 56 DEGREES
PLATELET # BLD AUTO: 255 THOUSANDS/UL (ref 149–390)
PMV BLD AUTO: 9.6 FL (ref 8.9–12.7)
POTASSIUM SERPL-SCNC: 4.6 MMOL/L (ref 3.5–5.3)
PR INTERVAL: 186 MS
PROT SERPL-MCNC: 6.9 G/DL (ref 6.4–8.4)
QRS AXIS: -25 DEGREES
QRSD INTERVAL: 120 MS
QT INTERVAL: 416 MS
QTC INTERVAL: 416 MS
RBC # BLD AUTO: 4.65 MILLION/UL (ref 3.81–5.12)
SODIUM SERPL-SCNC: 138 MMOL/L (ref 135–147)
T WAVE AXIS: 42 DEGREES
VENTRICULAR RATE: 60 BPM
WBC # BLD AUTO: 10.19 THOUSAND/UL (ref 4.31–10.16)

## 2022-06-30 PROCEDURE — 85379 FIBRIN DEGRADATION QUANT: CPT | Performed by: EMERGENCY MEDICINE

## 2022-06-30 PROCEDURE — 99285 EMERGENCY DEPT VISIT HI MDM: CPT | Performed by: EMERGENCY MEDICINE

## 2022-06-30 PROCEDURE — 84484 ASSAY OF TROPONIN QUANT: CPT

## 2022-06-30 PROCEDURE — 36415 COLL VENOUS BLD VENIPUNCTURE: CPT

## 2022-06-30 PROCEDURE — 93010 ELECTROCARDIOGRAM REPORT: CPT | Performed by: INTERNAL MEDICINE

## 2022-06-30 PROCEDURE — 71275 CT ANGIOGRAPHY CHEST: CPT

## 2022-06-30 PROCEDURE — 80053 COMPREHEN METABOLIC PANEL: CPT

## 2022-06-30 PROCEDURE — 99285 EMERGENCY DEPT VISIT HI MDM: CPT

## 2022-06-30 PROCEDURE — 85025 COMPLETE CBC W/AUTO DIFF WBC: CPT

## 2022-06-30 PROCEDURE — G1004 CDSM NDSC: HCPCS

## 2022-06-30 PROCEDURE — 93005 ELECTROCARDIOGRAM TRACING: CPT

## 2022-06-30 PROCEDURE — 71045 X-RAY EXAM CHEST 1 VIEW: CPT

## 2022-06-30 RX ADMIN — IOHEXOL 70 ML: 350 INJECTION, SOLUTION INTRAVENOUS at 15:16

## 2022-06-30 NOTE — ED PROVIDER NOTES
History  Chief Complaint   Patient presents with    Chest Pain     Onset PTA while at dentist office  Pain free on arrival     80year-old female coming into the ED for evaluation of left-sided chest pain that occurred while at a dentist office  She states they had her changing chairs few times and when she went to lean out of a chair forward she developed a sharp stabbing pain to the left side of her chest that hurt to breathe  She was given nitroglycerin tablet and rested and the pain resolved  She denies any radiation of the pain, no shortness of breath, no palpitations, no nausea, no diaphoresis  No history of heart disease  She does have a history of hypertension is on medication  History provided by:  Patient   used: No    Chest Pain      Prior to Admission Medications   Prescriptions Last Dose Informant Patient Reported? Taking?    Ascorbic Acid (Vitamin C) 500 MG CAPS   Yes No   Sig: Take by mouth   Lutein 10 MG TABS   Yes No   Sig: Take by mouth   Multiple Vitamins-Minerals (multivitamin with minerals) tablet   Yes No   Sig: Take 1 tablet by mouth daily   aspirin 81 mg chewable tablet   Yes No   Sig: Chew 81 mg daily   calcium carbonate (OS-HUMBERTO) 600 MG tablet   Yes No   Sig: Take 600 mg by mouth 2 (two) times a day with meals 500mg BID   hydrochlorothiazide (HYDRODIURIL) 12 5 mg tablet   No No   Sig: Take 1 tablet (12 5 mg total) by mouth daily   levothyroxine 25 mcg tablet   No No   Sig: TAKE ONE TABLET BY MOUTH ONCE DAILY   metoprolol tartrate (LOPRESSOR) 50 mg tablet   No No   Sig: Take 1 tablet (50 mg total) by mouth 2 (two) times a day   omega-3-acid ethyl esters (LOVAZA) 1 g capsule   Yes No   Sig: Take 2 g by mouth 2 (two) times a day   zinc gluconate 50 mg tablet   Yes No   Sig: Take 50 mg by mouth daily      Facility-Administered Medications: None       Past Medical History:   Diagnosis Date    Abdominal pain, LLQ 10/22/2021    Acute rhinitis 4/22/2022    Age related osteoporosis 01/22/2021    BMI 28 0-28 9,adult 01/22/2021    CKD (chronic kidney disease)     Colonoscopy refused     Dizziness     DJD (degenerative joint disease)     Edema of both lower legs 01/22/2021    Essential hypertension 01/22/2021    GE reflux     General weakness     Heart murmur, aortic     Heart murmur, aortic 10/22/2021    Hematoma of scalp 09/15/2021    History of breast cancer     Hypercalcemia     Hyperkalemia 01/22/2021    Hypertension     Hypothyroidism 07/23/2021    Laceration of right thumb 09/15/2021    Leukocytosis 1/21/2022    Macular degeneration     Nausea 12/29/2021    Numbness and tingling 07/23/2021    Osteoporosis     Overweight     Primary osteoarthritis 01/22/2021    Sinus headache 12/29/2021    Skin lesion     SOB (shortness of breath) 12/29/2021    Stasis edema of both lower extremities     URI (upper respiratory infection) 4/22/2022       Past Surgical History:   Procedure Laterality Date    ABDOMINAL HYSTERECTOMY      BREAST SURGERY      CHOLECYSTECTOMY      COLONOSCOPY  07/18/2011    Does not want any more    DXA PROCEDURE (HISTORICAL)  01/29/2014    Does not want any more    IR KYPHOPLASTY/VERTEBROPLASTY      LITHOLINK CKD PROGRAM (HISTORICAL)      MAMMO (HISTORICAL)  10/05/2020    Mammo and US     MASTECTOMY Left     SQUAMOUS CELL CARCINOMA EXCISION      Left forearm     TOTAL HIP ARTHROPLASTY Bilateral     TOTAL SHOULDER REPLACEMENT Right        Family History   Problem Relation Age of Onset    Lung cancer Father      I have reviewed and agree with the history as documented      E-Cigarette/Vaping    E-Cigarette Use Never User      E-Cigarette/Vaping Substances    Nicotine No     THC No     CBD No     Flavoring No     Other No     Unknown No      Social History     Tobacco Use    Smoking status: Never Smoker    Smokeless tobacco: Never Used   Vaping Use    Vaping Use: Never used   Substance Use Topics    Alcohol use: Not Currently    Drug use: Never       Review of Systems   Cardiovascular: Positive for chest pain  All other systems reviewed and are negative  Physical Exam  Physical Exam  Vitals and nursing note reviewed  Constitutional:       General: She is not in acute distress  Appearance: Normal appearance  She is well-developed and normal weight  She is not ill-appearing, toxic-appearing or diaphoretic  HENT:      Head: Normocephalic and atraumatic  Right Ear: External ear normal       Left Ear: External ear normal       Nose: Nose normal       Mouth/Throat:      Mouth: Mucous membranes are moist       Pharynx: Oropharynx is clear  Eyes:      Conjunctiva/sclera: Conjunctivae normal    Cardiovascular:      Rate and Rhythm: Normal rate and regular rhythm  Pulses: Normal pulses  Heart sounds: Normal heart sounds  Pulmonary:      Effort: Pulmonary effort is normal       Breath sounds: Normal breath sounds  Abdominal:      General: Abdomen is flat  Bowel sounds are normal  There is no distension or abdominal bruit  There are no signs of injury  Palpations: Abdomen is soft  There is no shifting dullness  Tenderness: There is no abdominal tenderness  Genitourinary:     Adnexa: Right adnexa normal and left adnexa normal    Musculoskeletal:         General: Normal range of motion  Cervical back: Normal range of motion and neck supple  Right lower leg: No edema  Left lower leg: No edema  Skin:     General: Skin is warm and dry  Capillary Refill: Capillary refill takes less than 2 seconds  Neurological:      General: No focal deficit present  Mental Status: She is alert and oriented to person, place, and time  Mental status is at baseline     Psychiatric:         Mood and Affect: Mood normal          Behavior: Behavior normal          Vital Signs  ED Triage Vitals   Temperature Pulse Respirations Blood Pressure SpO2   06/30/22 1042 06/30/22 1042 06/30/22 1042 06/30/22 1042 06/30/22 1042   97 9 °F (36 6 °C) 56 16 (!) 182/89 97 %      Temp Source Heart Rate Source Patient Position - Orthostatic VS BP Location FiO2 (%)   06/30/22 1042 06/30/22 1042 06/30/22 1525 06/30/22 1042 --   Oral Monitor Sitting Right arm       Pain Score       06/30/22 1042       No Pain           Vitals:    06/30/22 1042 06/30/22 1045 06/30/22 1525 06/30/22 1600   BP: (!) 182/89 (!) 182/89 (!) 203/96 (!) 189/93   Pulse: 56 56 64 56   Patient Position - Orthostatic VS:   Sitting          Visual Acuity      ED Medications  Medications   iohexol (OMNIPAQUE) 350 MG/ML injection (SINGLE-DOSE) 70 mL (70 mL Intravenous Given 6/30/22 1516)       Diagnostic Studies  Results Reviewed     Procedure Component Value Units Date/Time    HS Troponin I 2hr [[id number]]  (Normal) Collected: 06/30/22 1445    Lab Status: Final result Specimen: Blood from Arm, Right Updated: 06/30/22 1534     hs TnI 2hr 6 ng/L      Delta 2hr hsTnI 0 ng/L     D-dimer, quantitative [[id number]]  (Abnormal) Collected: 06/30/22 1146    Lab Status: Final result Specimen: Blood from Arm, Right Updated: 06/30/22 1242     D-Dimer, Quant 1 13 ug/ml FEU     Narrative: In the evaluation for possible pulmonary embolism, in the appropriate (Well's Score of 4 or less) patient, the age adjusted d-dimer cutoff for this patient can be calculated as:    Age x 0 01 (in ug/mL) for Age-adjusted D-dimer exclusion threshold for a patient over 50 years      HS Troponin 0hr (reflex protocol) [[id number]]  (Normal) Collected: 06/30/22 1146    Lab Status: Final result Specimen: Blood from Arm, Right Updated: 06/30/22 1237     hs TnI 0hr 6 ng/L     Comprehensive metabolic panel [[id number]] Collected: 06/30/22 1146    Lab Status: Final result Specimen: Blood from Arm, Right Updated: 06/30/22 1230     Sodium 138 mmol/L      Potassium 4 6 mmol/L      Chloride 104 mmol/L      CO2 28 mmol/L      ANION GAP 6 mmol/L      BUN 25 mg/dL      Creatinine 1 04 mg/dL      Glucose 98 mg/dL      Calcium 10 0 mg/dL      AST 22 U/L      ALT 16 U/L      Alkaline Phosphatase 81 U/L      Total Protein 6 9 g/dL      Albumin 4 0 g/dL      Total Bilirubin 0 73 mg/dL      eGFR 45 ml/min/1 73sq m     Narrative:      [place] guidelines for Chronic Kidney Disease (CKD):     Stage 1 with normal or high GFR (GFR > 90 mL/min/1 73 square meters)    Stage 2 Mild CKD (GFR = 60-89 mL/min/1 73 square meters)    Stage 3A Moderate CKD (GFR = 45-59 mL/min/1 73 square meters)    Stage 3B Moderate CKD (GFR = 30-44 mL/min/1 73 square meters)    Stage 4 Severe CKD (GFR = 15-29 mL/min/1 73 square meters)    Stage 5 End Stage CKD (GFR <15 mL/min/1 73 square meters)  Note: GFR calculation is accurate only with a steady state creatinine    CBC and differential [[id number]]  (Abnormal) Collected: 06/30/22 1146    Lab Status: Final result Specimen: Blood from Arm, Right Updated: 06/30/22 1209     WBC 10 19 Thousand/uL      RBC 4 65 Million/uL      Hemoglobin 14 3 g/dL      Hematocrit 43 9 %      MCV 94 fL      MCH 30 8 pg      MCHC 32 6 g/dL      RDW 14 4 %      MPV 9 6 fL      Platelets 649 Thousands/uL      nRBC 0 /100 WBCs      Neutrophils Relative 66 %      Immat GRANS % 1 %      Lymphocytes Relative 20 %      Monocytes Relative 7 %      Eosinophils Relative 5 %      Basophils Relative 1 %      Neutrophils Absolute 6 72 Thousands/µL      Immature Grans Absolute 0 05 Thousand/uL      Lymphocytes Absolute 2 06 Thousands/µL      Monocytes Absolute 0 70 Thousand/µL      Eosinophils Absolute 0 54 Thousand/µL      Basophils Absolute 0 12 Thousands/µL                  CTA ED chest PE study   Final Result by Gloria Giles MD (06/30 1755)      No pulmonary embolism  No acute consolidation   Mosaic attenuation lung parenchyma may be due to small airway disease      Incidentally detected left upper lobe lung nodule measuring about 3 mm   Based on current Fleischner Society 2017 Guidelines on incidental pulmonary nodule, no routine follow-up is needed if the patient is low risk  If the patient is high risk, optional    follow-up chest CT at 12 months can be considered  Workstation performed: [id number]         XR chest 1 view portable   Final Result by Elissa Wilkins MD (06/30 1601)      Overall interstitial prominence may be related to mild interstitial edema  Workstation performed: [id number]                    Procedures  ECG 12 Lead Documentation Only    Date/Time: 6/30/2022 12:08 PM  Performed by: Nickie Shirley DO  Authorized by: Nickie Shirley DO     Indications / Diagnosis:  Chest pain  ECG reviewed by me, the ED Provider: yes    Patient location:  ED  Previous ECG:     Previous ECG:  Compared to current    Similarity:  No change    Comparison to cardiac monitor: Yes    Rate:     ECG rate:  60    ECG rate assessment: normal    Rhythm:     Rhythm: sinus rhythm    Ectopy:     Ectopy: none    QRS:     QRS axis:  Normal    QRS intervals:  Normal  Conduction:     Conduction: normal    ST segments:     ST segments:  Normal  T waves:     T waves: normal    Other findings:     Other findings: LVH               ED Course             HEART Risk Score    Flowsheet Row Most Recent Value   Heart Score Risk Calculator    History 0 Filed at: 06/30/2022 1215   ECG 0 Filed at: 06/30/2022 1215   Age 2 Filed at: 06/30/2022 1215   Risk Factors 1 Filed at: 06/30/2022 1215   Troponin 0 Filed at: 06/30/2022 1215   HEART Score 3 Filed at: 06/30/2022 1215                        SBIRT 22yo+    Flowsheet Row Most Recent Value   SBIRT (25 yo +)    In order to provide better care to our patients, we are screening all of our patients for alcohol and drug use  Would it be okay to ask you these screening questions?  Unable to answer at this time Filed at: 06/30/2022 1454                    MDM  Number of Diagnoses or Management Options  Atypical chest pain: new and requires workup  Hypertension: new and requires workup  Pulmonary nodule: new and requires workup  Diagnosis management comments: 79 yo F w/ atypical brief L sided chest pain, positional in nature, brief lasting maybe 15-20 minutes  L sided, pleuritic, worse w/ movement  Resolved in ED  Trop neg x 2, 6, then 6 again  CTA neg for PE after + d-dimer  Heart Score 3, low risk for MACE  Stable for d/c home, f/u PCP  Amount and/or Complexity of Data Reviewed  Clinical lab tests: ordered and reviewed  Tests in the radiology section of CPT®: ordered and reviewed  Decide to obtain previous medical records or to obtain history from someone other than the patient: yes    Risk of Complications, Morbidity, and/or Mortality  Presenting problems: moderate  Diagnostic procedures: moderate  Management options: moderate    Patient Progress  Patient progress: stable      Disposition  Final diagnoses:   Atypical chest pain   Pulmonary nodule   Hypertension     Time reflects when diagnosis was documented in both MDM as applicable and the Disposition within this note     Time User Action Codes Description Comment    6/30/2022  4:12 PM Doc Zulma Add [R07 89] Atypical chest pain     6/30/2022  4:12 PM Doc Zulma Add [R91 1] Pulmonary nodule     6/30/2022  4:14 PM Kamran [address] [place] [address] Hypertension       ED Disposition     ED Disposition   Discharge    Condition   Stable    Date/Time   Thu Jun 30, 2022  4:12 PM    Comment   Ayleen Wood discharge to home/self care                 Follow-up Information    None         Discharge Medication List as of 6/30/2022  4:14 PM      CONTINUE these medications which have NOT CHANGED    Details   Ascorbic Acid (Vitamin C) 500 MG CAPS Take by mouth, Historical Med      aspirin 81 mg chewable tablet Chew 81 mg daily, Historical Med      calcium carbonate (OS-HUMBERTO) 600 MG tablet Take 600 mg by mouth 2 (two) times a day with meals 500mg BID, Historical Med      hydrochlorothiazide (HYDRODIURIL) 12 5 mg tablet Take 1 tablet (12 5 mg total) by mouth daily, Starting Fri 10/22/2021, Normal      levothyroxine 25 mcg tablet TAKE ONE TABLET BY MOUTH ONCE DAILY, Normal      Lutein 10 MG TABS Take by mouth, Historical Med      metoprolol tartrate (LOPRESSOR) 50 mg tablet Take 1 tablet (50 mg total) by mouth 2 (two) times a day, Starting Fri 4/22/2022, Normal      Multiple Vitamins-Minerals (multivitamin with minerals) tablet Take 1 tablet by mouth daily, Historical Med      omega-3-acid ethyl esters (LOVAZA) 1 g capsule Take 2 g by mouth 2 (two) times a day, Historical Med      zinc gluconate 50 mg tablet Take 50 mg by mouth daily, Historical Med             No discharge procedures on file      PDMP Review     None          ED Provider  Electronically Signed by           Nickie Shirley DO  06/30/22 2011

## 2022-07-22 ENCOUNTER — HOSPITAL ENCOUNTER (OUTPATIENT)
Dept: RADIOLOGY | Facility: HOSPITAL | Age: 87
Discharge: HOME | End: 2022-07-22
Attending: INTERNAL MEDICINE
Payer: MEDICARE

## 2022-07-22 ENCOUNTER — OFFICE VISIT (OUTPATIENT)
Dept: INTERNAL MEDICINE CLINIC | Facility: CLINIC | Age: 87
End: 2022-07-22
Payer: MEDICARE

## 2022-07-22 VITALS
HEART RATE: 81 BPM | OXYGEN SATURATION: 97 % | SYSTOLIC BLOOD PRESSURE: 142 MMHG | HEIGHT: 67 IN | DIASTOLIC BLOOD PRESSURE: 100 MMHG | BODY MASS INDEX: 29.49 KG/M2 | TEMPERATURE: 97.7 F | RESPIRATION RATE: 16 BRPM

## 2022-07-22 DIAGNOSIS — E03.9 ACQUIRED HYPOTHYROIDISM: ICD-10-CM

## 2022-07-22 DIAGNOSIS — M25.512 CHRONIC LEFT SHOULDER PAIN: ICD-10-CM

## 2022-07-22 DIAGNOSIS — G89.29 CHRONIC LEFT SHOULDER PAIN: ICD-10-CM

## 2022-07-22 DIAGNOSIS — R91.1 NODULE OF LEFT LUNG: ICD-10-CM

## 2022-07-22 DIAGNOSIS — I35.8 HEART MURMUR, AORTIC: ICD-10-CM

## 2022-07-22 DIAGNOSIS — M54.2 NECK PAIN: ICD-10-CM

## 2022-07-22 DIAGNOSIS — R07.89 OTHER CHEST PAIN: ICD-10-CM

## 2022-07-22 DIAGNOSIS — I10 ESSENTIAL HYPERTENSION: Chronic | ICD-10-CM

## 2022-07-22 DIAGNOSIS — N18.32 STAGE 3B CHRONIC KIDNEY DISEASE (HCC): Chronic | ICD-10-CM

## 2022-07-22 DIAGNOSIS — R60.0 EDEMA OF BOTH LOWER LEGS: Chronic | ICD-10-CM

## 2022-07-22 DIAGNOSIS — M54.2 NECK PAIN: Primary | ICD-10-CM

## 2022-07-22 PROBLEM — R07.9 CHEST PAIN: Status: ACTIVE | Noted: 2022-07-22

## 2022-07-22 PROCEDURE — 73030 X-RAY EXAM OF SHOULDER: CPT

## 2022-07-22 PROCEDURE — 99214 OFFICE O/P EST MOD 30 MIN: CPT | Performed by: INTERNAL MEDICINE

## 2022-07-22 PROCEDURE — 72050 X-RAY EXAM NECK SPINE 4/5VWS: CPT

## 2022-07-22 RX ORDER — CEPHALEXIN 500 MG/1
CAPSULE ORAL
COMMUNITY
Start: 2022-06-19 | End: 2022-07-22 | Stop reason: ALTCHOICE

## 2022-07-22 RX ORDER — AMOXICILLIN 500 MG/1
CAPSULE ORAL
COMMUNITY
Start: 2022-06-19 | End: 2022-07-22 | Stop reason: ALTCHOICE

## 2022-07-22 RX ORDER — METHYLPREDNISOLONE 4 MG/1
TABLET ORAL
Qty: 21 EACH | Refills: 0 | Status: SHIPPED | OUTPATIENT
Start: 2022-07-22 | End: 2022-10-21 | Stop reason: ALTCHOICE

## 2022-07-22 NOTE — PATIENT INSTRUCTIONS
Patient was advised to continue present medications  discussed with the patient medications and laboratory data in detail  Follow-up with me as advised  If any blood test was ordered please do 1 week prior to next appointment unless advise to get earlier    If you have any questions please call the office [phone]

## 2022-07-22 NOTE — PROGRESS NOTES
Assessment/Plan:    1  Neck pain  Assessment & Plan:  She has a left-sided neck pain going in the left arm and sometimes gets tingling numbness of her left hand fingers possible radiculopathy  Will start Medrol Dosepak  Will obtain x-ray of the C spines  Discussed with the patient to starting the physical therapy and seeing the specialist but she would like to wait  Orders:  -     XR spine cervical complete 4 or 5 vw non injury; Future; Expected date: 07/22/2022  -     methylPREDNISolone 4 MG tablet therapy pack; Use as directed on package    2  Chronic left shoulder pain  Assessment & Plan:  Possible arthritis versus other etiology  Has a prior history of trauma  No recent trauma  Will obtain x-ray  She takes 2 Advil q day p r n  as acetaminophen is not effective  Discussed with the patient avoid NSAID due to CKD    Orders:  -     XR shoulder 2+ vw left; Future; Expected date: 07/22/2022    3  Essential hypertension  Assessment & Plan:  Blood pressure elevated  Patient states when she checked her blood pressure at home  systolic blood pressure was 130 but she does not know how was diastolic blood pressure  She has blood pressure elevated advised to increase metoprolol 50 mg b i d  to 75 mg b i d   Monitor blood pressure at home  4  Acquired hypothyroidism  Assessment & Plan:  Her TSH was slightly elevated to 4 6 last time  Has been taking levothyroxine 25 mcg daily  Will follow up a TSH in the future advised to continue present medication      5  Stage 3b chronic kidney disease [facility])  Assessment & Plan:  Lab Results   Component Value Date    EGFR 45 06/30/2022    EGFR 36 04/09/2022    EGFR 37 10/16/2021    CREATININE 1 04 06/30/2022    CREATININE 1 25 04/09/2022    CREATININE 1 23 (H) 10/16/2021   Renal function stable  Advised to increase and maintain hydration        6  Heart murmur, aortic  Assessment & Plan:  She has a murmur aortic area advised to see cardiologist but she would like to wait      7  Other chest pain  Assessment & Plan:  She had chest pain for which she was seen at emergency room  Had a CTA of chest did not reveal any pulmonary embolism  She was given 1 nitroglycerin with relief of chest pain per patient  Discussed with the patient to see cardiologist but she would like to wait  No further chest pain since after emergency room visit  8  BMI 29 0-29 9,adult  Assessment & Plan:  Patient  was advised to decrease portion size  Advised to decrease carb, sugar, cholesterol intake  Advised to lose weight  9  Nodule of left lung  Assessment & Plan:  She has a 3 mm left upper lung nodule when she went to emergency room  She never smoked  Discussed the patient follow-up CT scan after 12 months due to history of CA breast       10  Edema of both lower legs  Assessment & Plan:  Better after she was placed on HCTZ and she keeps leg elevated  BMI Counseling: Body mass index is 29 49 kg/m²  The BMI is above normal  Nutrition recommendations include decreasing portion sizes, decreasing fast food intake, consuming healthier snacks, limiting drinks that contain sugar, moderation in carbohydrate intake, reducing intake of saturated and trans fat and reducing intake of cholesterol  No pharmacotherapy was ordered  Rationale for BMI follow-up plan is due to patient being overweight or obese  Depression Screening and Follow-up Plan: Patient was screened for depression during today's encounter  They screened negative with a PHQ-2 score of 0  Subjective:  Patient presents follow-up  Patient ID: Kelly Posadas is a 80 y o  female  HPI   42-year-old white female patient presents for follow-up her medical problems  Patient is here with her daughter  She had a chest pain June 30, 2022 for which she went to emergency room  Had a workup including CT of the chest, EKG, blood test   She got 1 nitroglycerin got better    She has no further chest pain since he left the hospital   She has no cough  Denies any nausea vomiting diarrhea pain abdomen  Denies any shortness of breath  Has a left-sided neck pain radiating to left upper arm and tingling numbness in the left hand fingers sometime  Complain of left shoulder pain  Has a prior history of trauma  No recent injury  The following portions of the patient's history were reviewed and updated as appropriate:     Past Medical History:  She has a past medical history of Abdominal pain, LLQ (10/22/2021), Acute rhinitis (04/22/2022), Age related osteoporosis (01/22/2021), Arthritis, BMI 28 0-28 9,adult (01/22/2021), BMI 29 0-29 9,adult (7/22/2022), [facility] ([facility] ), Chest pain (7/22/2022), CKD (chronic kidney disease), Colonoscopy refused, Diverticulitis of colon, Dizziness, DJD (degenerative joint disease), Edema of both lower legs (01/22/2021), Essential hypertension (01/22/2021), GE reflux, General weakness, Heart murmur, aortic, Heart murmur, aortic (10/22/2021), Hematoma of scalp (09/15/2021), History of breast cancer, Hypercalcemia, Hyperkalemia (01/22/2021), Hypertension, Hypothyroidism (07/23/2021), Laceration of right thumb (09/15/2021), Left shoulder pain (7/22/2022), Leukocytosis (01/21/2022), Macular degeneration, Nausea (12/29/2021), Neck pain (7/22/2022), Nodule of left lung (7/22/2022), Numbness and tingling (07/23/2021), Osteoporosis, Other chest pain (7/22/2022), Overweight, Primary osteoarthritis (01/22/2021), Sinus headache (12/29/2021), Skin lesion, SOB (shortness of breath) (12/29/2021), Stasis edema of both lower extremities, and URI (upper respiratory infection) (04/22/2022)  ,  _______________________________________________________________________  Past Surgical History:   has a past surgical history that includes Breast surgery; Squamous cell carcinoma excision; Cholecystectomy; Total hip arthroplasty (Bilateral); Total shoulder replacement (Right); Abdominal hysterectomy; Mastectomy (Left);  Colonoscopy (07/18/2011); DXA procedure(historical) (01/29/2014); Mammo (historical) (10/05/2020); IR kyphoplasty/vertebroplasty; and LITHOLINK CKD PROGRAM (HISTORICAL)  ,  _______________________________________________________________________  Family History:  family history includes Lung cancer in her father ,  _______________________________________________________________________  Social History:   reports that she has never smoked  She has never used smokeless tobacco  She reports current alcohol use of about 1 0 standard drink of alcohol per week  She reports that she does not use drugs  ,  _______________________________________________________________________  Allergies:  is allergic to actonel [risedronate sodium], amlodipine, and orudis [ketoprofen]     _______________________________________________________________________  Current Outpatient Medications   Medication Sig Dispense Refill    Ascorbic Acid (Vitamin C) 500 MG CAPS Take by mouth      aspirin 81 mg chewable tablet Chew 81 mg daily      calcium carbonate (OS-HUMBERTO) 600 MG tablet Take 600 mg by mouth 2 (two) times a day with meals 500mg BID      hydrochlorothiazide (HYDRODIURIL) 12 5 mg tablet Take 1 tablet (12 5 mg total) by mouth daily 90 tablet 1    levothyroxine 25 mcg tablet TAKE ONE TABLET BY MOUTH ONCE DAILY 90 tablet 0    Lutein 10 MG TABS Take by mouth      methylPREDNISolone 4 MG tablet therapy pack Use as directed on package 21 each 0    metoprolol tartrate (LOPRESSOR) 50 mg tablet Take 1 tablet (50 mg total) by mouth 2 (two) times a day 180 tablet 1    Multiple Vitamins-Minerals (multivitamin with minerals) tablet Take 1 tablet by mouth daily      omega-3-acid ethyl esters (LOVAZA) 1 g capsule Take 2 g by mouth 2 (two) times a day      zinc gluconate 50 mg tablet Take 50 mg by mouth daily       No current facility-administered medications for this visit      _______________________________________________________________________  Review of Systems   Constitutional: Negative for chills and fever  HENT: Negative for congestion, ear pain, hearing loss, nosebleeds, sinus pain, sore throat and trouble swallowing  Eyes: Negative for discharge, redness and visual disturbance  Respiratory: Negative for cough, chest tightness and shortness of breath  Cardiovascular: Negative for chest pain and palpitations  Gastrointestinal: Negative for abdominal pain, blood in stool, constipation, diarrhea, nausea and vomiting  Genitourinary: Negative for dysuria, flank pain, genital sores and hematuria  Musculoskeletal: Positive for arthralgias and neck pain (Left side of the neck)  Negative for myalgias  Back pain:  left shoulder pain  Skin: Negative for color change and rash  Neurological: Positive for numbness ( sometime tingling numbness fingers of the left hand)  Negative for dizziness, speech difficulty, weakness and headaches  Hematological: Does not bruise/bleed easily  Psychiatric/Behavioral: Negative for agitation and behavioral problems  Objective:    Vitals:    07/22/22 0846   BP: 142/100   BP Location: Right arm   Patient Position: Sitting   Cuff Size: Standard   Pulse: 81   Resp: 16   Temp: 97 7 °F (36 5 °C)   TempSrc: Tympanic   SpO2: 97%   Height: 5' 7" (1 702 m)     Body mass index is 29 49 kg/m²  Physical Exam  Vitals and nursing note reviewed  Constitutional:       General: She is not in acute distress  Appearance: Normal appearance  HENT:      Head: Normocephalic and atraumatic  Right Ear: Ear canal and external ear normal       Left Ear: Ear canal and external ear normal       Nose: Nose normal       Mouth/Throat:      Mouth: Mucous membranes are moist    Eyes:      General: No scleral icterus  Right eye: No discharge  Left eye: No discharge  Extraocular Movements: Extraocular movements intact        Conjunctiva/sclera: Conjunctivae normal    Cardiovascular:      Rate and Rhythm: Normal rate and regular rhythm  Pulses: Normal pulses  Heart sounds: No murmur heard  Pulmonary:      Effort: Pulmonary effort is normal  No respiratory distress  Breath sounds: Normal breath sounds  Abdominal:      General: Bowel sounds are normal       Palpations: Abdomen is soft  Tenderness: There is no abdominal tenderness  Musculoskeletal:         General: Tenderness (Has some tenderness left shoulder and decreased range of motion left shoulder pain) present  Normal range of motion  Cervical back: Normal range of motion and neck supple  Tenderness ( has some tenderness on left side of the neck and left trepezius  muscle area ) present  No muscular tenderness  Right lower leg: No edema  Left lower leg: No edema  Skin:     General: Skin is warm  Findings: No rash  Neurological:      General: No focal deficit present  Mental Status: She is alert and oriented to person, place, and time  Motor: No weakness  Coordination: Coordination normal    Psychiatric:         Mood and Affect: Mood normal          Behavior: Behavior normal        I spent 30 minutes with the patient today    More than 50% time spent for reviewing of external notes, reviewing of the results of diagnostics test, management of care, patient education and ordering of test

## 2022-07-23 DIAGNOSIS — E03.9 ACQUIRED HYPOTHYROIDISM: ICD-10-CM

## 2022-07-23 NOTE — ASSESSMENT & PLAN NOTE
Patient  was advised to decrease portion size  Advised to decrease carb, sugar, cholesterol intake  Advised to lose weight

## 2022-07-23 NOTE — ASSESSMENT & PLAN NOTE
She has a left-sided neck pain going in the left arm and sometimes gets tingling numbness of her left hand fingers possible radiculopathy  Will start Medrol Dosepak  Will obtain x-ray of the C spines  Discussed with the patient to starting the physical therapy and seeing the specialist but she would like to wait

## 2022-07-23 NOTE — ASSESSMENT & PLAN NOTE
Lab Results   Component Value Date    EGFR 45 06/30/2022    EGFR 36 04/09/2022    EGFR 37 10/16/2021    CREATININE 1 04 06/30/2022    CREATININE 1 25 04/09/2022    CREATININE 1 23 (H) 10/16/2021   Renal function stable  Advised to increase and maintain hydration

## 2022-07-23 NOTE — ASSESSMENT & PLAN NOTE
Her TSH was slightly elevated to 4 6 last time  Has been taking levothyroxine 25 mcg daily    Will follow up a TSH in the future advised to continue present medication

## 2022-07-23 NOTE — ASSESSMENT & PLAN NOTE
She has a 3 mm left upper lung nodule when she went to emergency room  She never smoked    Discussed the patient follow-up CT scan after 12 months due to history of CA breast

## 2022-07-23 NOTE — ASSESSMENT & PLAN NOTE
Possible arthritis versus other etiology  Has a prior history of trauma  No recent trauma  Will obtain x-ray  She takes 2 Advil q day p r n  as acetaminophen is not effective    Discussed with the patient avoid NSAID due to CKD

## 2022-07-23 NOTE — ASSESSMENT & PLAN NOTE
Blood pressure elevated  Patient states when she checked her blood pressure at home  systolic blood pressure was 130 but she does not know how was diastolic blood pressure  She has blood pressure elevated advised to increase metoprolol 50 mg b i d  to 75 mg b i d   Monitor blood pressure at home

## 2022-07-23 NOTE — ASSESSMENT & PLAN NOTE
She had chest pain for which she was seen at emergency room  Had a CTA of chest did not reveal any pulmonary embolism  She was given 1 nitroglycerin with relief of chest pain per patient  Discussed with the patient to see cardiologist but she would like to wait  No further chest pain since after emergency room visit

## 2022-07-25 ENCOUNTER — TELEPHONE (OUTPATIENT)
Dept: INTERNAL MEDICINE CLINIC | Facility: CLINIC | Age: 87
End: 2022-07-25

## 2022-07-25 RX ORDER — LEVOTHYROXINE SODIUM 0.03 MG/1
TABLET ORAL
Qty: 90 TABLET | Refills: 0 | Status: SHIPPED | OUTPATIENT
Start: 2022-07-25

## 2022-07-25 NOTE — TELEPHONE ENCOUNTER
Please call [facility] for result of x-rays of  her neck and left shoulder, it was done on July 22,2022

## 2022-08-12 ENCOUNTER — OFFICE VISIT (OUTPATIENT)
Dept: INTERNAL MEDICINE CLINIC | Facility: CLINIC | Age: 87
End: 2022-08-12
Payer: MEDICARE

## 2022-08-12 VITALS
DIASTOLIC BLOOD PRESSURE: 80 MMHG | SYSTOLIC BLOOD PRESSURE: 132 MMHG | TEMPERATURE: 97.3 F | HEIGHT: 67 IN | OXYGEN SATURATION: 96 % | BODY MASS INDEX: 29.49 KG/M2 | HEART RATE: 64 BPM

## 2022-08-12 DIAGNOSIS — I10 ESSENTIAL HYPERTENSION: Primary | Chronic | ICD-10-CM

## 2022-08-12 DIAGNOSIS — M25.512 CHRONIC LEFT SHOULDER PAIN: ICD-10-CM

## 2022-08-12 DIAGNOSIS — N18.32 STAGE 3B CHRONIC KIDNEY DISEASE (HCC): Chronic | ICD-10-CM

## 2022-08-12 DIAGNOSIS — M54.2 NECK PAIN: ICD-10-CM

## 2022-08-12 DIAGNOSIS — E03.9 ACQUIRED HYPOTHYROIDISM: ICD-10-CM

## 2022-08-12 DIAGNOSIS — I35.8 HEART MURMUR, AORTIC: ICD-10-CM

## 2022-08-12 DIAGNOSIS — G89.29 CHRONIC LEFT SHOULDER PAIN: ICD-10-CM

## 2022-08-12 PROCEDURE — 99213 OFFICE O/P EST LOW 20 MIN: CPT | Performed by: INTERNAL MEDICINE

## 2022-08-12 NOTE — PATIENT INSTRUCTIONS
Patient was advised to continue present medications  discussed with the patient medications and laboratory data in detail  Follow-up with me as advised  If any blood test was ordered please do 1 week prior to next appointment unless advise to get earlier    If you have any questions please call the office [phone]

## 2022-08-12 NOTE — PROGRESS NOTES
Assessment/Plan:    1  Essential hypertension  Assessment & Plan:  Patient states after her metoprolol dose was  increased from  50 mg b i d  to 75 mg b i d  she developed dizziness so she cut back on metoprolol back to 50 mg b i d  since then she has no dizziness  Now her  Blood pressure well controlled  Advised to continue present medications  Advised for low-salt diet  2  Acquired hypothyroidism  Assessment & Plan:  Last time TSH was elevated to 4 6  Discussed with the patient follow-up TSH level next time but she would like to wait  Will continue present dose of levothyroxine 25 mcg daily  3  Stage 3b chronic kidney disease [facility])  Assessment & Plan:  Lab Results   Component Value Date    EGFR 45 06/30/2022    EGFR 36 04/09/2022    EGFR 37 10/16/2021    CREATININE 1 04 06/30/2022    CREATININE 1 25 04/09/2022    CREATININE 1 23 (H) 10/16/2021   Stable  her GFR  increased from 37-45  Advised to maintain hydration  4  Neck pain  Assessment & Plan:  Her neck pain is better  She has been seen  by chiropractor [facility] [employer] [facility]  And she has been applying heat  Her x-ray revealed osteoarthritis  5  Heart murmur, aortic  Assessment & Plan:  She has a heart murmur aortic area  It was discussed with the patient in the past regarding seening cardiologist but she does not want as she is asymptomatic  6  Chronic left shoulder pain  Assessment & Plan:  Her x-ray revealed significant arthritis  As mentioned above she was seen by chiropractor and feeling better  Subjective:  Patient presents for follow-up      Patient ID: Becky Mello is a 80 y o  female  HPI   63-year-old white female patient presents for follow-up of  her medical problems  She is here with her daughter  She denies any chest pain, shortness  of breath, pain in abdomen  She was seen by chiropractor for her neck and shoulder pain she [facility] and has been using heat since then her pain is better    She increased her metoprolol 50 mg b i d  to 75 mg b i d  then after she got dizziness so she decrease back to 50 mg b i d  since then her dizziness is all resolved  Her blood pressure is better  No new problems  The following portions of the patient's history were reviewed and updated as appropriate:     Past Medical History:  She has a past medical history of Abdominal pain, LLQ (10/22/2021), Acute rhinitis (04/22/2022), Age related osteoporosis (01/22/2021), Arthritis, BMI 28 0-28 9,adult (01/22/2021), BMI 29 0-29 9,adult (7/22/2022), Cancer ([facility] ), Chest pain (7/22/2022), CKD (chronic kidney disease), Colonoscopy refused, Diverticulitis of colon, Dizziness, DJD (degenerative joint disease), Edema of both lower legs (01/22/2021), Essential hypertension (01/22/2021), GE reflux, General weakness, Heart murmur, aortic, Heart murmur, aortic (10/22/2021), Hematoma of scalp (09/15/2021), History of breast cancer, Hypercalcemia, Hyperkalemia (01/22/2021), Hypertension, Hypothyroidism (07/23/2021), Laceration of right thumb (09/15/2021), Left shoulder pain (7/22/2022), Left shoulder pain (8/12/2022), Leukocytosis (01/21/2022), Macular degeneration, Nausea (12/29/2021), Neck pain (7/22/2022), Nodule of left lung (7/22/2022), Numbness and tingling (07/23/2021), Osteoporosis, Other chest pain (7/22/2022), Overweight, Primary osteoarthritis (01/22/2021), Sinus headache (12/29/2021), Skin lesion, SOB (shortness of breath) (12/29/2021), Stasis edema of both lower extremities, and URI (upper respiratory infection) (04/22/2022)  ,  _______________________________________________________________________  Past Surgical History:   has a past surgical history that includes Breast surgery; Squamous cell carcinoma excision; Cholecystectomy; Total hip arthroplasty (Bilateral); Total shoulder replacement (Right); Abdominal hysterectomy; Mastectomy (Left); Colonoscopy (07/18/2011); DXA procedure(historical) (01/29/2014);  Mammo (historical) (10/05/2020); IR kyphoplasty/vertebroplasty; [facility]  ,  _______________________________________________________________________  Family History:  family history includes Lung cancer in her father ,  _______________________________________________________________________  Social History:   reports that she has never smoked  She has never used smokeless tobacco  She reports current alcohol use of about 1 0 standard drink of alcohol per week  She reports that she does not use drugs  ,  _______________________________________________________________________  Allergies:  is allergic to actonel [risedronate sodium], amlodipine, and orudis [ketoprofen]     _______________________________________________________________________  Current Outpatient Medications   Medication Sig Dispense Refill    Ascorbic Acid (Vitamin C) 500 MG CAPS Take by mouth      aspirin 81 mg chewable tablet Chew 81 mg daily      calcium carbonate (OS-HUMBERTO) 600 MG tablet Take 600 mg by mouth 2 (two) times a day with meals 500mg BID      hydrochlorothiazide (HYDRODIURIL) 12 5 mg tablet Take 1 tablet (12 5 mg total) by mouth daily 90 tablet 1    levothyroxine 25 mcg tablet TAKE ONE TABLET BY MOUTH ONCE DAILY 90 tablet 0    Lutein 10 MG TABS Take by mouth      metoprolol tartrate (LOPRESSOR) 50 mg tablet Take 1 tablet (50 mg total) by mouth 2 (two) times a day 180 tablet 1    Multiple Vitamins-Minerals (multivitamin with minerals) tablet Take 1 tablet by mouth daily      omega-3-acid ethyl esters (LOVAZA) 1 g capsule Take 2 g by mouth 2 (two) times a day      zinc gluconate 50 mg tablet Take 50 mg by mouth daily      methylPREDNISolone 4 MG tablet therapy pack Use as directed on package (Patient not taking: Reported on 8/12/2022) 21 each 0     No current facility-administered medications for this visit      _______________________________________________________________________  Review of Systems Constitutional: Negative for chills and fever  HENT: Negative for congestion, ear pain, hearing loss, nosebleeds, sinus pain, sore throat and trouble swallowing  Eyes: Negative for discharge, redness and visual disturbance  Respiratory: Negative for cough, chest tightness and shortness of breath  Cardiovascular: Negative for chest pain and palpitations  Gastrointestinal: Negative for abdominal pain, blood in stool, constipation, diarrhea, nausea and vomiting  Genitourinary: Negative for dysuria, flank pain and hematuria  Musculoskeletal: Positive for arthralgias ( left shoulder pain but better) and neck pain (But better)  Negative for myalgias  Skin: Negative for color change and rash  Neurological: Negative for dizziness, speech difficulty, weakness and headaches  Hematological: Does not bruise/bleed easily  Psychiatric/Behavioral: Negative for agitation and behavioral problems  Objective:  Vitals:    08/12/22 1209   BP: 132/80   BP Location: Right arm   Patient Position: Sitting   Cuff Size: Large   Pulse: 64   Temp: (!) 97 3 °F (36 3 °C)   TempSrc: Tympanic   SpO2: 96%   Height: 5' 7" (1 702 m)     Body mass index is 29 49 kg/m²  Physical Exam  Vitals and nursing note reviewed  Constitutional:       General: She is not in acute distress  Appearance: Normal appearance  HENT:      Head: Normocephalic and atraumatic  Right Ear: Ear canal and external ear normal       Left Ear: Ear canal and external ear normal       Nose: Nose normal       Mouth/Throat:      Mouth: Mucous membranes are moist    Eyes:      General: No scleral icterus  Right eye: No discharge  Left eye: No discharge  Extraocular Movements: Extraocular movements intact  Conjunctiva/sclera: Conjunctivae normal    Cardiovascular:      Rate and Rhythm: Normal rate and regular rhythm  Pulses: Normal pulses  Heart sounds: No murmur heard    Pulmonary:      Effort: Pulmonary effort is normal  No respiratory distress  Breath sounds: Normal breath sounds  Abdominal:      General: Bowel sounds are normal       Palpations: Abdomen is soft  Tenderness: There is no abdominal tenderness  Musculoskeletal:      Cervical back: Normal range of motion and neck supple  Tenderness (Has mild tenderness but has a good range of motion  And tenderness better) present  No muscular tenderness  Right lower leg: No edema  Left lower leg: No edema  Comments: Left shoulder has a decreased range of motion when she lifts left shoulder up   Skin:     General: Skin is warm  Findings: No rash  Neurological:      General: No focal deficit present  Mental Status: She is alert and oriented to person, place, and time  Motor: No weakness        Coordination: Coordination normal    Psychiatric:         Mood and Affect: Mood normal          Behavior: Behavior normal

## 2022-08-13 NOTE — ASSESSMENT & PLAN NOTE
Her neck pain is better  She has been seen  by chiropractor [facility]  And she has been applying heat  Her x-ray revealed osteoarthritis

## 2022-08-13 NOTE — ASSESSMENT & PLAN NOTE
Lab Results   Component Value Date    EGFR 45 06/30/2022    EGFR 36 04/09/2022    EGFR 37 10/16/2021    CREATININE 1 04 06/30/2022    CREATININE 1 25 04/09/2022    CREATININE 1 23 (H) 10/16/2021   Stable  her GFR  increased from 37-45  Advised to maintain hydration

## 2022-08-13 NOTE — ASSESSMENT & PLAN NOTE
Last time TSH was elevated to 4 6  Discussed with the patient follow-up TSH level next time but she would like to wait  Will continue present dose of levothyroxine 25 mcg daily

## 2022-08-13 NOTE — ASSESSMENT & PLAN NOTE
She has a heart murmur aortic area  It was discussed with the patient in the past regarding seening cardiologist but she does not want as she is asymptomatic

## 2022-08-13 NOTE — ASSESSMENT & PLAN NOTE
Her x-ray revealed significant arthritis  As mentioned above she was seen by chiropractor and feeling better

## 2022-10-12 PROBLEM — S61.011A LACERATION OF RIGHT THUMB: Status: RESOLVED | Noted: 2021-09-15 | Resolved: 2022-10-12

## 2022-10-21 ENCOUNTER — OFFICE VISIT (OUTPATIENT)
Dept: INTERNAL MEDICINE CLINIC | Facility: CLINIC | Age: 87
End: 2022-10-21
Payer: MEDICARE

## 2022-10-21 VITALS — SYSTOLIC BLOOD PRESSURE: 140 MMHG | HEART RATE: 72 BPM | DIASTOLIC BLOOD PRESSURE: 88 MMHG

## 2022-10-21 DIAGNOSIS — R01.1 HEART MURMUR: ICD-10-CM

## 2022-10-21 DIAGNOSIS — N18.31 STAGE 3A CHRONIC KIDNEY DISEASE (HCC): Chronic | ICD-10-CM

## 2022-10-21 DIAGNOSIS — I10 ESSENTIAL HYPERTENSION: Primary | Chronic | ICD-10-CM

## 2022-10-21 DIAGNOSIS — D72.829 LEUKOCYTOSIS, UNSPECIFIED TYPE: ICD-10-CM

## 2022-10-21 DIAGNOSIS — M25.512 CHRONIC LEFT SHOULDER PAIN: ICD-10-CM

## 2022-10-21 DIAGNOSIS — H35.30 MACULAR DEGENERATION OF BOTH EYES, UNSPECIFIED TYPE: Chronic | ICD-10-CM

## 2022-10-21 DIAGNOSIS — G89.29 CHRONIC LEFT SHOULDER PAIN: ICD-10-CM

## 2022-10-21 DIAGNOSIS — E66.3 OVER WEIGHT: ICD-10-CM

## 2022-10-21 DIAGNOSIS — E03.9 ACQUIRED HYPOTHYROIDISM: ICD-10-CM

## 2022-10-21 DIAGNOSIS — R60.0 EDEMA OF BOTH LOWER LEGS: Chronic | ICD-10-CM

## 2022-10-21 PROCEDURE — 99214 OFFICE O/P EST MOD 30 MIN: CPT | Performed by: INTERNAL MEDICINE

## 2022-10-21 NOTE — ASSESSMENT & PLAN NOTE
Last time TSH was slightly elevated to 4 6 on levothyroxine 25 mcg daily  Advised to continue same dose and follow TSH

## 2022-10-21 NOTE — ASSESSMENT & PLAN NOTE
Edema both legs resolved  She takes hydrochlorothiazide 12 5 mg once a day and has been keeping legs elevated

## 2022-10-21 NOTE — ASSESSMENT & PLAN NOTE
Lab Results   Component Value Date    EGFR 45 06/30/2022    EGFR 36 04/09/2022    EGFR 37 10/16/2021    CREATININE 1 04 06/30/2022    CREATININE 1 25 04/09/2022    CREATININE 1 23 (H) 10/16/2021   Her renal function stable  Advised to maintain hydration  Will follow electrolytes renal function   Continue present medications

## 2022-10-21 NOTE — ASSESSMENT & PLAN NOTE
Patient  was advised to decrease portion size  Advised to decrease carb, sugar, cholesterol intake  Advised to ambulate as much as she can Advised to lose weight

## 2022-10-21 NOTE — ASSESSMENT & PLAN NOTE
She has a murmur aortic area  Presently asymptomatic  Discussed with the patient about seeing a cardiologist but she does not want any consult  with Cardiology as presently she is asymptomatic

## 2022-10-21 NOTE — ASSESSMENT & PLAN NOTE
She gets pain in left shoulder due to significant osteoarthritis  Discussed with the patient to see orthopedic specialist but she does not want to see orthopedic specialist   She takes Advil 2 tablets at bedtime as needed for pain  Acetaminophen is not effective  Discussed with the patient about avoiding NSAIDs due to CKD but patient states Advil  works for her for pain

## 2022-10-21 NOTE — ASSESSMENT & PLAN NOTE
Patient  was advised to decrease portion size  Advised to decrease carb, sugar, cholesterol intake  Advised to ambulate as much as she can Advised to lose weight  Patient declined for weight

## 2022-10-21 NOTE — ASSESSMENT & PLAN NOTE
She has been followed by ophthalmologist   She is getting injection in her right eye every 3 months  She has a blurring of vision bilaterally  She does not drive

## 2022-10-21 NOTE — PATIENT INSTRUCTIONS
Patient was advised to continue present medications  discussed with the patient medications and laboratory data in detail  Follow-up with me in 3 months or as advised  If any blood test was ordered please do 1 week prior to next appointment unless advise to get earlier    If you have any questions please call the office [phone]

## 2022-10-21 NOTE — PROGRESS NOTES
Assessment/Plan:    1  Essential hypertension  Assessment & Plan:  Blood pressure well controlled  Advised to continue present medication  Advised for low-salt diet  Orders:  -     Basic metabolic panel; Future    2  Acquired hypothyroidism  Assessment & Plan:  Last time TSH was slightly elevated to 4 6 on levothyroxine 25 mcg daily  Advised to continue same dose and follow TSH  Orders:  -     TSH, 3rd generation; Future    3  Stage 3a chronic kidney disease [facility])  Assessment & Plan:  Lab Results   Component Value Date    EGFR 45 06/30/2022    EGFR 36 04/09/2022    EGFR 37 10/16/2021    CREATININE 1 04 06/30/2022    CREATININE 1 25 04/09/2022    CREATININE 1 23 (H) 10/16/2021   Her renal function stable  Advised to maintain hydration  Will follow electrolytes renal function   Continue present medications  Orders:  -     Basic metabolic panel; Future    4  Edema of both lower legs  Assessment & Plan:  Edema both legs resolved  She takes hydrochlorothiazide 12 5 mg once a day and has been keeping legs elevated  5  Leukocytosis, unspecified type  Assessment & Plan:  Last time her WBC was 13 9 with elevated hemoglobin  No signs of infection  Will observe and follow CBC  Orders:  -     CBC and differential; Future    6  Chronic left shoulder pain  Assessment & Plan:  She gets pain in left shoulder due to significant osteoarthritis  Discussed with the patient to see orthopedic specialist but she does not want to see orthopedic specialist   She takes Advil 2 tablets at bedtime as needed for pain  Acetaminophen is not effective  Discussed with the patient about avoiding NSAIDs due to CKD but patient states Advil  works for her for pain  7  Macular degeneration of both eyes, unspecified type  Assessment & Plan:  She has been followed by ophthalmologist   She is getting injection in her right eye every 3 months  She has a blurring of vision bilaterally  She does not drive        8  Over weight  Assessment & Plan:  Patient  was advised to decrease portion size  Advised to decrease carb, sugar, cholesterol intake  Advised to ambulate as much as she can Advised to lose weight  Patient declined for weight  9  Heart murmur  Assessment & Plan:  She has a murmur aortic area  Presently asymptomatic  Discussed with the patient about seeing a cardiologist but she does not want any consult  with Cardiology as presently she is asymptomatic  Subjective:  Patient presents for follow-up  Patient ID: Sierra Daley is a 80 y o  female  HPI   26-year-old white female patient presents for follow-up her medical problems  She denies any chest pain, shortness of breath, pain in abdomen  Denies any cough headache, chills  Denies nausea vomiting diarrhea      The following portions of the patient's history were reviewed and updated as appropriate:     Past Medical History:  She has a past medical history of Abdominal pain, LLQ (10/22/2021), Acute rhinitis (04/22/2022), Age related osteoporosis (01/22/2021), Arthritis, BMI 28 0-28 9,adult (01/22/2021), BMI 29 0-29 9,adult (7/22/2022), Cancer ([facility] [address] ), Chest pain (7/22/2022), CKD (chronic kidney disease), Colonoscopy refused, Diverticulitis of colon, Dizziness, DJD (degenerative joint disease), Edema of both lower legs (01/22/2021), Essential hypertension (01/22/2021), GE reflux, General weakness, Heart murmur (10/21/2022), Heart murmur, aortic, Heart murmur, aortic (10/22/2021), Hematoma of scalp (09/15/2021), History of breast cancer, Hypercalcemia, Hyperkalemia (01/22/2021), Hypertension, Hypothyroidism (07/23/2021), Laceration of right thumb (09/15/2021), Left shoulder pain (7/22/2022), Left shoulder pain (8/12/2022), Leukocytosis (01/21/2022), Macular degeneration, Nausea (12/29/2021), Neck pain (7/22/2022), Nodule of left lung (7/22/2022), Numbness and tingling (07/23/2021), Osteoporosis, Other chest pain (7/22/2022), Over weight (10/21/2022), Overweight, Primary osteoarthritis (01/22/2021), Sinus headache (12/29/2021), Skin lesion, SOB (shortness of breath) (12/29/2021), Stasis edema of both lower extremities, and URI (upper respiratory infection) (04/22/2022)  ,  _______________________________________________________________________  Past Surgical History:   has a past surgical history that includes Breast surgery; Squamous cell carcinoma excision; Cholecystectomy; Total hip arthroplasty (Bilateral); Total shoulder replacement (Right); Abdominal hysterectomy; Mastectomy (Left); Colonoscopy (07/18/2011); DXA procedure(historical) (01/29/2014); Mammo (historical) (10/05/2020); IR kyphoplasty/vertebroplasty; and [facility]ITH[facility] [employer] CKD PROGRAM (HISTORICAL)  ,  _______________________________________________________________________  Family History:  family history includes Lung cancer in her father ,  _______________________________________________________________________  Social History:   reports that she has never smoked  She has never used smokeless tobacco  She reports current alcohol use of about 1 0 standard drink of alcohol per week  She reports that she does not use drugs  ,  _______________________________________________________________________  Allergies:  is allergic to actonel [risedronate sodium], amlodipine, and orudis [ketoprofen]     _______________________________________________________________________  Current Outpatient Medications   Medication Sig Dispense Refill   • Ascorbic Acid (Vitamin C) 500 MG CAPS Take by mouth     • aspirin 81 mg chewable tablet Chew 81 mg daily     • calcium carbonate (OS-HUMBERTO) 600 MG tablet Take 600 mg by mouth 2 (two) times a day with meals 500mg BID     • hydrochlorothiazide (HYDRODIURIL) 12 5 mg tablet Take 1 tablet (12 5 mg total) by mouth daily 90 tablet 1   • levothyroxine 25 mcg tablet TAKE ONE TABLET BY MOUTH ONCE DAILY 90 tablet 0   • Lutein 10 MG TABS Take by mouth     • metoprolol tartrate (LOPRESSOR) 50 mg tablet Take 1 tablet (50 mg total) by mouth 2 (two) times a day 180 tablet 1   • Multiple Vitamins-Minerals (multivitamin with minerals) tablet Take 1 tablet by mouth daily     • omega-3-acid ethyl esters (LOVAZA) 1 g capsule Take 2 g by mouth 2 (two) times a day     • zinc gluconate 50 mg tablet Take 50 mg by mouth daily       No current facility-administered medications for this visit      _______________________________________________________________________  Review of Systems   Constitutional: Negative for chills and fever  HENT: Negative for congestion, ear pain, hearing loss, nosebleeds, sinus pain, sore throat and trouble swallowing  Eyes: Positive for visual disturbance ( she has a blurring of vision bilaterally due to her macular degeneration  )  Negative for discharge and redness  Respiratory: Negative for cough, chest tightness and shortness of breath  Cardiovascular: Negative for chest pain and palpitations  Gastrointestinal: Negative for abdominal pain, blood in stool, constipation, diarrhea, nausea and vomiting  Genitourinary: Negative for dysuria, flank pain and hematuria  Musculoskeletal: Positive for arthralgias (She gets some mainly pain in the left shoulder worse at night )  Negative for myalgias and neck pain  Skin: Negative for color change and rash  Neurological: Negative for dizziness, speech difficulty, weakness and headaches  Hematological: Does not bruise/bleed easily  Psychiatric/Behavioral: Negative for agitation and behavioral problems  Objective:  Vitals:    10/21/22 0857   BP: 140/88   Pulse: 72     There is no height or weight on file to calculate BMI  Physical Exam  Vitals and nursing note reviewed  Constitutional:       General: She is not in acute distress  Appearance: Normal appearance  HENT:      Head: Normocephalic and atraumatic        Right Ear: Ear canal and external ear normal       Left Ear: Ear canal and external ear normal       Nose: Nose normal       Mouth/Throat:      Mouth: Mucous membranes are moist    Eyes:      General: No scleral icterus  Right eye: No discharge  Left eye: No discharge  Extraocular Movements: Extraocular movements intact  Conjunctiva/sclera: Conjunctivae normal    Cardiovascular:      Rate and Rhythm: Normal rate and regular rhythm  Pulses: Normal pulses  Heart sounds: Murmur heard  Pulmonary:      Effort: Pulmonary effort is normal  No respiratory distress  Breath sounds: Normal breath sounds  Abdominal:      General: Bowel sounds are normal       Palpations: Abdomen is soft  Tenderness: There is no abdominal tenderness  Musculoskeletal:         General: Tenderness (Has pain in left shoulder when she tried to lift  left shoulder up  ) present  Normal range of motion  Cervical back: Normal range of motion and neck supple  No muscular tenderness  Right lower leg: No edema  Left lower leg: No edema  Skin:     General: Skin is warm  Findings: No rash  Neurological:      General: No focal deficit present  Mental Status: She is alert and oriented to person, place, and time  Motor: No weakness  Coordination: Coordination normal       Comments: She is ambulating without any assistance   Psychiatric:         Mood and Affect: Mood normal          Behavior: Behavior normal          I spent 30 minutes with the patient today    More than 50% time spent for reviewing of external notes, reviewing of the results of diagnostics test, management of care, patient education and ordering of test

## 2022-10-26 DIAGNOSIS — I10 ESSENTIAL HYPERTENSION: Chronic | ICD-10-CM

## 2022-10-26 RX ORDER — METOPROLOL TARTRATE 50 MG/1
TABLET, FILM COATED ORAL
Qty: 180 TABLET | Refills: 0 | Status: SHIPPED | OUTPATIENT
Start: 2022-10-26

## 2022-10-28 ENCOUNTER — IMMUNIZATIONS (OUTPATIENT)
Dept: INTERNAL MEDICINE CLINIC | Facility: CLINIC | Age: 87
End: 2022-10-28

## 2022-10-28 DIAGNOSIS — Z23 NEED FOR PROPHYLACTIC VACCINATION AND INOCULATION AGAINST INFLUENZA: Primary | ICD-10-CM

## 2022-11-01 ENCOUNTER — TELEMEDICINE (OUTPATIENT)
Dept: INTERNAL MEDICINE CLINIC | Facility: CLINIC | Age: 87
End: 2022-11-01

## 2022-11-01 ENCOUNTER — TELEPHONE (OUTPATIENT)
Dept: INTERNAL MEDICINE CLINIC | Facility: CLINIC | Age: 87
End: 2022-11-01

## 2022-11-01 DIAGNOSIS — J32.9 SINOBRONCHITIS: Primary | ICD-10-CM

## 2022-11-01 DIAGNOSIS — E03.9 ACQUIRED HYPOTHYROIDISM: ICD-10-CM

## 2022-11-01 DIAGNOSIS — N18.31 STAGE 3A CHRONIC KIDNEY DISEASE (HCC): Chronic | ICD-10-CM

## 2022-11-01 DIAGNOSIS — I10 ESSENTIAL HYPERTENSION: Chronic | ICD-10-CM

## 2022-11-01 DIAGNOSIS — J40 SINOBRONCHITIS: Primary | ICD-10-CM

## 2022-11-01 RX ORDER — FLUTICASONE PROPIONATE 50 MCG
2 SPRAY, SUSPENSION (ML) NASAL DAILY
Qty: 9.9 ML | Refills: 0 | Status: SHIPPED | OUTPATIENT
Start: 2022-11-01

## 2022-11-01 RX ORDER — CEFUROXIME AXETIL 250 MG/1
250 TABLET ORAL EVERY 12 HOURS SCHEDULED
Qty: 20 TABLET | Refills: 0 | Status: SHIPPED | OUTPATIENT
Start: 2022-11-01 | End: 2022-11-11

## 2022-11-01 NOTE — PROGRESS NOTES
Virtual Regular Visit    Verification of patient location:    Patient is located in the following state in which I hold an active license [place]      Assessment/Plan:    Problem List Items Addressed This Visit        Endocrine    Hypothyroidism       Respiratory    Sinobronchitis - Primary     Patient has a Mucinex DM at home so advised to take Mucinex DM p r n  for cough  Advised to increase fluid  Advised to check COVID-19 test at home and notify me if abnormal   will prescribe antibiotic and nasal spray         Relevant Medications    cefuroxime (CEFTIN) 250 mg tablet    fluticasone (FLONASE) 50 mcg/act nasal spray       Cardiovascular and Mediastinum    Essential hypertension (Chronic)       Genitourinary    CKD (chronic kidney disease) (Chronic)               Reason for visit is   Chief Complaint   Patient presents with   • Virtual Regular Visit        Encounter provider Minerva Hernandze MD    Provider located at [address] [place] [id number] [phone]      Recent Visits  No visits were found meeting these conditions  Showing recent visits within past 7 days and meeting all other requirements  Today's Visits  Date Type Provider Dept   11/01/22 [id number] [facility] 1 Internal Med   11/01/22 Telephone [id number]   Showing today's visits and meeting all other requirements  Future Appointments  No visits were found meeting these conditions  Showing future appointments within next 150 days and meeting all other requirements       The patient was identified by name and date of birth  Collin Kenny was informed that this is a telemedicine visit and that the visit is being conducted through the [address] [place] [address] Now platform  She agrees to proceed     My office door was closed  No one else was in the room    She acknowledged consent and understanding of privacy and security of the video platform  The patient has agreed to participate and understands they can discontinue the visit at any time  Patient is aware this is a billable service  Subjective sinus congestion, headache, cough, sore throat for last 4 days  Kevin Blanco is a 80 y o  female    HPI   55-year-old white female patient who developed sinus congestion, headache, cough, sore throat for last 4 days  She has been taking  Tylenol for headache  She denies any chest pain or shortness of breath  Denies any fever  Denies nausea vomiting diarrhea pain in abdomen  Nobody sick at home  Denies any exposure to known person with COVID-19 infection      Past Medical History:   Diagnosis Date   • Abdominal pain, LLQ 10/22/2021   • Acute rhinitis 04/22/2022   • Age related osteoporosis 01/22/2021   • Arthritis    • BMI 28 0-28 9,adult 01/22/2021   • BMI 29 0-29 9,adult 7/22/2022   • Cancer ([address] )    • Chest pain 7/22/2022   • CKD (chronic kidney disease)    • Colonoscopy refused    • Diverticulitis of colon    • Dizziness    • DJD (degenerative joint disease)    • Edema of both lower legs 01/22/2021   • Essential hypertension 01/22/2021   • GE reflux    • General weakness    • Heart murmur 10/21/2022   • Heart murmur, aortic    • Heart murmur, aortic 10/22/2021   • Hematoma of scalp 09/15/2021   • History of breast cancer    • Hypercalcemia    • Hyperkalemia 01/22/2021   • Hypertension    • Hypothyroidism 07/23/2021   • Laceration of right thumb 09/15/2021   • Left shoulder pain 7/22/2022   • Left shoulder pain 8/12/2022   • Leukocytosis 01/21/2022   • Macular degeneration    • Nausea 12/29/2021   • Neck pain 7/22/2022   • Nodule of left lung 7/22/2022   • Numbness and tingling 07/23/2021   • Osteoporosis    • Other chest pain 7/22/2022   • Over weight 10/21/2022   • Overweight    • Primary osteoarthritis 01/22/2021   • Sinobronchitis 11/1/2022   • Sinus headache 12/29/2021   • Skin lesion    • SOB (shortness of breath) 12/29/2021   • Stasis edema of both lower extremities    • URI (upper respiratory infection) 04/22/2022       Past Surgical History:   Procedure Laterality Date   • ABDOMINAL HYSTERECTOMY     • BREAST SURGERY     • CHOLECYSTECTOMY     • COLONOSCOPY  07/18/2011    Does not want any more   • DXA PROCEDURE (HISTORICAL)  01/29/2014    Does not want any more   • IR KYPHOPLASTY/VERTEBROPLASTY     • LITHOLINK CKD PROGRAM (HISTORICAL)     • MAMMO (HISTORICAL)  10/05/2020    Mammo and US    • MASTECTOMY Left    • SQUAMOUS CELL CARCINOMA EXCISION      Left forearm    • TOTAL HIP ARTHROPLASTY Bilateral    • TOTAL SHOULDER REPLACEMENT Right        Current Outpatient Medications   Medication Sig Dispense Refill   • cefuroxime (CEFTIN) 250 mg tablet Take 1 tablet (250 mg total) by mouth every 12 (twelve) hours for 10 days 20 tablet 0   • fluticasone (FLONASE) 50 mcg/act nasal spray 2 sprays into each nostril daily 9 9 mL 0   • Ascorbic Acid (Vitamin C) 500 MG CAPS Take by mouth     • aspirin 81 mg chewable tablet Chew 81 mg daily     • calcium carbonate (OS-HUMBERTO) 600 MG tablet Take 600 mg by mouth 2 (two) times a day with meals 500mg BID     • hydrochlorothiazide (HYDRODIURIL) 12 5 mg tablet Take 1 tablet (12 5 mg total) by mouth daily 90 tablet 1   • levothyroxine 25 mcg tablet TAKE ONE TABLET BY MOUTH ONCE DAILY 90 tablet 0   • Lutein 10 MG TABS Take by mouth     • metoprolol tartrate (LOPRESSOR) 50 mg tablet TAKE ONE TABLET BY MOUTH TWICE DAILY 180 tablet 0   • Multiple Vitamins-Minerals (multivitamin with minerals) tablet Take 1 tablet by mouth daily     • omega-3-acid ethyl esters (LOVAZA) 1 g capsule Take 2 g by mouth 2 (two) times a day     • zinc gluconate 50 mg tablet Take 50 mg by mouth daily       No current facility-administered medications for this visit          Allergies   Allergen Reactions   • Actonel [Risedronate Sodium] Fatigue   • Amlodipine Fatigue   • Orudis [Ketoprofen] Fatigue       Review of Systems   Constitutional: Negative for fever  HENT: Positive for congestion, sinus pressure and sore throat  Eyes: Positive for visual disturbance (Has very poor vision chronic)  Respiratory: Positive for cough  Negative for shortness of breath  Cardiovascular: Negative for chest pain  Gastrointestinal: Negative for abdominal pain, constipation, diarrhea, nausea and vomiting  Musculoskeletal: Positive for arthralgias ( has a chronic pain in shoulders more on left than right)  Neurological: Positive for headaches  Negative for dizziness, syncope, facial asymmetry and speech difficulty  Psychiatric/Behavioral: Negative for agitation and behavioral problems  Video Exam    There were no vitals filed for this visit  Physical Exam  HENT:      Head: Normocephalic  Eyes:      General:         Right eye: No discharge  Left eye: No discharge  Pulmonary:      Effort: Pulmonary effort is normal  No respiratory distress  Musculoskeletal:         General: Normal range of motion  Cervical back: Normal range of motion  Neurological:      General: No focal deficit present  Mental Status: She is alert and oriented to person, place, and time     Psychiatric:         Mood and Affect: Mood normal          Behavior: Behavior normal           I spent 20 minutes directly with the patient during this visit

## 2022-11-01 NOTE — TELEPHONE ENCOUNTER
Harmeet Jerri called Dot has a terrible sore throat and cough - would like to do a virtual visit with you  - she has to wait until her  comes home from work tho - so she will call back

## 2022-11-02 ENCOUNTER — TELEPHONE (OUTPATIENT)
Dept: INTERNAL MEDICINE CLINIC | Facility: CLINIC | Age: 87
End: 2022-11-02

## 2022-11-02 DIAGNOSIS — U07.1 COVID-19: Primary | ICD-10-CM

## 2022-11-02 RX ORDER — NIRMATRELVIR AND RITONAVIR 150-100 MG
2 KIT ORAL 2 TIMES DAILY
Qty: 20 TABLET | Refills: 0 | Status: SHIPPED | OUTPATIENT
Start: 2022-11-02 | End: 2022-11-07

## 2022-11-02 NOTE — TELEPHONE ENCOUNTER
Patient's daughter called and said after virtual wih you yesterday, she slept very well last night and woke up feeling better  She did the COVID test and it did come out positive    She wants to know if she should continue the antibiotics or if you want to give her something else seeing as how she is positive for COVID

## 2022-11-02 NOTE — ASSESSMENT & PLAN NOTE
Patient has a Mucinex DM at home so advised to take Mucinex DM p r n  for cough  Advised to increase fluid    Advised to check COVID-19 test at home and notify me if abnormal   will prescribe antibiotic and nasal spray

## 2022-11-02 NOTE — TELEPHONE ENCOUNTER
Discussed with patient's daughter  Since he did not have any COVID-19 vaccine booster doses  And today is day 5 of Her symptoms and due to advanced age  advised to start paxlovid and stop cefuroxime  She agreed  Will order

## 2022-11-28 ENCOUNTER — TELEPHONE (OUTPATIENT)
Dept: INTERNAL MEDICINE CLINIC | Facility: CLINIC | Age: 87
End: 2022-11-28

## 2022-11-28 NOTE — TELEPHONE ENCOUNTER
Dr Mick Whitlock    Pts daughter called - Dot was in the hospital and they lowered her Metoprolol to 25 mg - since being home her BP has been around 140/80 - she is wondering if she should go back up to 50 mg

## 2022-11-30 NOTE — TELEPHONE ENCOUNTER
Continue monitoring blood pressure no change in treatment at present time if consistently remain elevated then we will decide whether we want to do virtual or Dr Zackary Carrillo  can take care of it when he comes back

## 2022-11-30 NOTE — TELEPHONE ENCOUNTER
Patients daughter called back - the home health RN and the Physical Therapist saw her yesterday and her BP was 160/90   - She is homebound do you want to do a Virtual or change her BP meds

## 2022-11-30 NOTE — TELEPHONE ENCOUNTER
Dr Cassy Rios advised    She did say to let you know that the VN also wanted to bring to your attention her Thryoid level is a little high as well

## 2022-12-05 ENCOUNTER — TELEMEDICINE (OUTPATIENT)
Dept: INTERNAL MEDICINE CLINIC | Facility: CLINIC | Age: 87
End: 2022-12-05

## 2022-12-05 DIAGNOSIS — E03.9 ACQUIRED HYPOTHYROIDISM: ICD-10-CM

## 2022-12-05 DIAGNOSIS — J18.9 PNEUMONIA OF RIGHT MIDDLE LOBE DUE TO INFECTIOUS ORGANISM: ICD-10-CM

## 2022-12-05 DIAGNOSIS — I10 ESSENTIAL HYPERTENSION: Chronic | ICD-10-CM

## 2022-12-05 DIAGNOSIS — Z76.89 ENCOUNTER FOR SUPPORT AND COORDINATION OF TRANSITION OF CARE: Primary | ICD-10-CM

## 2022-12-05 DIAGNOSIS — R29.898 MUSCULAR DECONDITIONING: ICD-10-CM

## 2022-12-06 NOTE — PROGRESS NOTES
Virtual TCM Visit:    Verification of patient location:    Patient is located in the following state in which I hold an active license [place]        Assessment/Plan:        Problem List Items Addressed This Visit        Endocrine    Hypothyroidism     Her last TSH was slightly elevated to 4 6  She was advised to continue levothyroxine 25 mcg daily  Will take check her TSH if persistently elevated then will adjust dose  Respiratory    Pneumonia due to infectious organism     She was found to have a right middle lobe pneumonia  She was started on IV antibiotic and was discharged on Augmentin for 7 days which she finished  Her cough is all resolved  Denies any fever or chills  Breathing is okay  Will order repeat chest x-ray for follow-up of her pneumonia  Relevant Orders    XR chest pa & lateral       Cardiovascular and Mediastinum    Essential hypertension (Chronic)     Her blood pressure was elevated since her metoprolol was decreased from 50 mg b i d  to 25 mg bid and HCTZ was discontinued upon discharge due to low blood pressure during hospitalization  But she increased her metoprolol back to 50 mg b i d  for last 3 days since then her blood pressure is better  Today her blood pressure was 119/78 advised continue present medication and low-salt diet            Musculoskeletal and Integument    Muscular deconditioning     Due to recent pneumonia and sepsis she has generalized deconditioning for which she is on physical therapy at home and also she has home health nurse who comes and checks her once a week as well  Her endurance is improving  She needs walker for an ambulation  Advised to maintain hydration  Other    Encounter for support and coordination of transition of care - Primary     She was admitted at [facility] from November 23, 2022 to November 25, 2022 for pneumonia  Her medical records of this hospitalization reviewed  Reason for visit is : For  transition of care management    Encounter provider Basim Dangelo MD       Provider located at [address] [id number] [phone]      Recent Visits  Date Type Provider Dept   11/28/22 Telephone Basim Dangelo MD [address] Internal Med   Showing recent visits within past 7 days and meeting all other requirements  Today's Visits  Date Type Provider Dept   12/05/22 Telemedicine Basim Dangelo MD [address] Internal Med   Showing today's visits and meeting all other requirements  Future Appointments  No visits were found meeting these conditions  Showing future appointments within next 150 days and meeting all other requirements       After connecting through [employer], the patient was identified by name and date of birth  Kerrie Solano was informed that this is a telemedicine visit and that the visit is being conducted through the [address] [employer] platform  She agrees to proceed     My office door was closed  No one else was in the room  She acknowledged consent and understanding of privacy and security of the video platform  The patient has agreed to participate and understands they can discontinue the visit at any time  Patient is aware this is a billable service  Subjective: For transition of care management     Patient ID: Kerrie Solano is a 80 y o  female  HPI   80-year-old white female patient presents for transition of care management after she was hospitalized at [facility] on November 23, 2022  She was found to have sepsis and right middle lobe pneumonia  She was treated with IV antibiotic and was discharged on p o  antibiotic Augmentin to continue for 7 days  Presently she is feeling better  She has some cough but much better  Breathing is better    Denies any chest pain or shortness for breath  Denies any cough fever chills  Denies nausea vomiting diarrhea pain abdomen  She has generalized weakness and deconditioning for which she is getting  home physical therapy and home visiting nurse  Review of Systems   Constitutional: Positive for fatigue  Negative for chills and fever  HENT: Negative for congestion, ear pain, hearing loss, nosebleeds, sinus pain, sore throat and trouble swallowing  Eyes: Negative for discharge, redness and visual disturbance  Respiratory: Negative for cough, chest tightness and shortness of breath  Cardiovascular: Negative for chest pain and palpitations  Gastrointestinal: Negative for abdominal pain, blood in stool, constipation, diarrhea, nausea and vomiting  Genitourinary: Negative for dysuria, flank pain, frequency and hematuria  Musculoskeletal: Negative for arthralgias, myalgias and neck pain  Skin: Negative for color change and rash  Neurological: Negative for dizziness, speech difficulty, weakness and headaches  Hematological: Does not bruise/bleed easily  Psychiatric/Behavioral: Negative for agitation and behavioral problems  Objective: There were no vitals filed for this visit  Physical Exam  Constitutional:       General: She is not in acute distress  Appearance: Normal appearance  HENT:      Head: Normocephalic and atraumatic  Eyes:      General:         Right eye: No discharge  Left eye: No discharge  Pulmonary:      Effort: Pulmonary effort is normal  No respiratory distress  Musculoskeletal:         General: Normal range of motion  Cervical back: Normal range of motion and neck supple  No muscular tenderness  Comments: She ambulates with walker   Skin:     General: Skin is warm  Neurological:      General: No focal deficit present  Mental Status: She is alert and oriented to person, place, and time     Psychiatric:         Mood and Affect: Mood normal          Behavior: Behavior normal          TCM Call     None      TCM Call     None              Transitional Care Management Review:  Kerrie Solano is a 80 y o  female here for TCM follow up  During the TCM phone call patient stated:    TCM Call     None      TCM Call     None          I spent 20 minutes with the patient during this visit  Basim Dangelo MD      VIRTUAL VISIT DISCLAIMER    Kerrie Solano verbally agrees to participate in [facility]  Pt is aware that [facility] could be limited without vital signs or the ability to perform a full hands-on physical exam  Jayna Lewis understands she or the provider may request at any time to terminate the video visit and request the patient to seek care or treatment in person

## 2022-12-06 NOTE — ASSESSMENT & PLAN NOTE
She was admitted at [facility] from November 23, 2022 to November 25, 2022 for pneumonia  Her medical records of this hospitalization reviewed

## 2022-12-06 NOTE — ASSESSMENT & PLAN NOTE
She was found to have a right middle lobe pneumonia  She was started on IV antibiotic and was discharged on Augmentin for 7 days which she finished  Her cough is all resolved  Denies any fever or chills  Breathing is okay  Will order repeat chest x-ray for follow-up of her pneumonia

## 2022-12-06 NOTE — ASSESSMENT & PLAN NOTE
Her blood pressure was elevated since her metoprolol was decreased from 50 mg b i d  to 25 mg bid and HCTZ was discontinued upon discharge due to low blood pressure during hospitalization  But she increased her metoprolol back to 50 mg b i d  for last 3 days since then her blood pressure is better    Today her blood pressure was 119/78 advised continue present medication and low-salt diet

## 2022-12-06 NOTE — ASSESSMENT & PLAN NOTE
Due to recent pneumonia and sepsis she has generalized deconditioning for which she is on physical therapy at home and also she has home health nurse who comes and checks her once a week as well  Her endurance is improving  She needs walker for an ambulation  Advised to maintain hydration

## 2022-12-06 NOTE — ASSESSMENT & PLAN NOTE
Her last TSH was slightly elevated to 4 6  She was advised to continue levothyroxine 25 mcg daily  Will take check her TSH if persistently elevated then will adjust dose

## 2023-01-20 ENCOUNTER — OFFICE VISIT (OUTPATIENT)
Dept: INTERNAL MEDICINE CLINIC | Facility: CLINIC | Age: 88
End: 2023-01-20

## 2023-01-20 VITALS
DIASTOLIC BLOOD PRESSURE: 82 MMHG | HEART RATE: 73 BPM | TEMPERATURE: 98.7 F | SYSTOLIC BLOOD PRESSURE: 128 MMHG | OXYGEN SATURATION: 97 % | RESPIRATION RATE: 16 BRPM

## 2023-01-20 DIAGNOSIS — Z00.00 MEDICARE ANNUAL WELLNESS VISIT, SUBSEQUENT: Primary | ICD-10-CM

## 2023-01-20 DIAGNOSIS — R60.0 EDEMA OF BOTH LOWER LEGS: Chronic | ICD-10-CM

## 2023-01-20 DIAGNOSIS — J18.9 PNEUMONIA OF RIGHT MIDDLE LOBE DUE TO INFECTIOUS ORGANISM: ICD-10-CM

## 2023-01-20 DIAGNOSIS — I10 ESSENTIAL HYPERTENSION: Chronic | ICD-10-CM

## 2023-01-20 DIAGNOSIS — R06.02 SOB (SHORTNESS OF BREATH) ON EXERTION: ICD-10-CM

## 2023-01-20 DIAGNOSIS — C50.012 MALIGNANT NEOPLASM OF NIPPLE OF LEFT BREAST IN FEMALE, UNSPECIFIED ESTROGEN RECEPTOR STATUS (HCC): ICD-10-CM

## 2023-01-20 DIAGNOSIS — H35.30 MACULAR DEGENERATION OF BOTH EYES, UNSPECIFIED TYPE: Chronic | ICD-10-CM

## 2023-01-20 DIAGNOSIS — N18.32 STAGE 3B CHRONIC KIDNEY DISEASE (HCC): Chronic | ICD-10-CM

## 2023-01-20 DIAGNOSIS — E03.9 ACQUIRED HYPOTHYROIDISM: ICD-10-CM

## 2023-01-20 DIAGNOSIS — R01.1 HEART MURMUR: ICD-10-CM

## 2023-01-20 NOTE — ASSESSMENT & PLAN NOTE
As mentioned above she was advised to see a cardiologist but she does not want to see any specialist   So will observe

## 2023-01-20 NOTE — ASSESSMENT & PLAN NOTE
Her TSH was low 0 22 so she was advised to change her levothyroxine 25 mcg daily to every other day  We will follow TSH

## 2023-01-20 NOTE — ASSESSMENT & PLAN NOTE
She has a poor vision due to macular degeneration  She is not driving    Had seen an ophthalmologist

## 2023-01-20 NOTE — PROGRESS NOTES
Assessment and Plan:     Problem List Items Addressed This Visit    None       Preventive health issues were discussed with patient, and age appropriate screening tests were ordered as noted in patient's After Visit Summary  Personalized health advice and appropriate referrals for health education or preventive services given if needed, as noted in patient's After Visit Summary       History of Present Illness:     Patient presents for a Medicare Wellness Visit    HPI   Patient Care Team:  Sarah Hill MD as PCP - General (Internal Medicine)     Review of Systems:     Review of Systems     Problem List:     Patient Active Problem List   Diagnosis   • Essential hypertension   • Hypercalcemia   • Hyperkalemia   • CKD (chronic kidney disease)   • Edema of both lower legs   • Age related osteoporosis   • Primary osteoarthritis   • BMI 28 0-28 9,adult   • Macular degeneration   • Malignant neoplasm of nipple of left breast in female [facility])   • Hypothyroidism   • Numbness and tingling   • Hematoma of scalp   • Abdominal pain, LLQ   • Heart murmur, aortic   • Sinus headache   • Nausea   • SOB (shortness of breath)   • Headache   • Leukocytosis   • URI (upper respiratory infection)   • Acute rhinitis   • Neck pain   • Chest pain   • BMI 29 0-29 9,adult   • Other chest pain   • Nodule of left lung   • Left shoulder pain   • Over weight   • Heart murmur   • Sinobronchitis   • Encounter for support and coordination of transition of care   • Pneumonia due to infectious organism   • Muscular deconditioning      Past Medical and Surgical History:     Past Medical History:   Diagnosis Date   • Abdominal pain, LLQ 10/22/2021   • Acute rhinitis 04/22/2022   • Age related osteoporosis 01/22/2021   • Arthritis    • BMI 28 0-28 9,adult 01/22/2021   • BMI 29 0-29 9,adult 7/22/2022   • Cancer ([facility] [address] )    • Chest pain 7/22/2022   • CKD (chronic kidney disease)    • Colonoscopy refused    • Diverticulitis of colon    • Dizziness    • DJD (degenerative joint disease)    • Edema of both lower legs 01/22/2021   • Encounter for support and coordination of transition of care 12/5/2022   • Essential hypertension 01/22/2021   • GE reflux    • General weakness    • Heart murmur 10/21/2022   • Heart murmur, aortic    • Heart murmur, aortic 10/22/2021   • Hematoma of scalp 09/15/2021   • History of breast cancer    • Hypercalcemia    • Hyperkalemia 01/22/2021   • Hypertension    • Hypothyroidism 07/23/2021   • Laceration of right thumb 09/15/2021   • Left shoulder pain 7/22/2022   • Left shoulder pain 8/12/2022   • Leukocytosis 01/21/2022   • Macular degeneration    • Muscular deconditioning 12/5/2022   • Nausea 12/29/2021   • Neck pain 7/22/2022   • Nodule of left lung 7/22/2022   • Numbness and tingling 07/23/2021   • Osteoporosis    • Other chest pain 7/22/2022   • Over weight 10/21/2022   • Overweight    • Pneumonia due to infectious organism 12/5/2022   • Primary osteoarthritis 01/22/2021   • Sinobronchitis 11/1/2022   • Sinus headache 12/29/2021   • Skin lesion    • SOB (shortness of breath) 12/29/2021   • Stasis edema of both lower extremities    • URI (upper respiratory infection) 04/22/2022     Past Surgical History:   Procedure Laterality Date   • ABDOMINAL HYSTERECTOMY     • BREAST SURGERY     • CHOLECYSTECTOMY     • COLONOSCOPY  07/18/2011    Does not want any more   • DXA PROCEDURE (HISTORICAL)  01/29/2014    Does not want any more   • IR KYPHOPLASTY/VERTEBROPLASTY     • LITHOLINK CKD PROGRAM (HISTORICAL)     • MAMMO (HISTORICAL)  10/05/2020    Mammo and US    • MASTECTOMY Left    • SQUAMOUS CELL CARCINOMA EXCISION      Left forearm    • TOTAL HIP ARTHROPLASTY Bilateral    • TOTAL SHOULDER REPLACEMENT Right       Family History:     Family History   Problem Relation Age of Onset   • Lung cancer Father       Social History:     Social History     Socioeconomic History   • Marital status:       Spouse name: None   • Number of children: None   • Years of education: None   • Highest education level: None   Occupational History   • Occupation: Retired    Tobacco Use   • Smoking status: Never   • Smokeless tobacco: Never   Vaping Use   • Vaping Use: Never used   Substance and Sexual Activity   • Alcohol use: Yes     Alcohol/week: 1 0 standard drink     Types: 1 Glasses of wine per week   • Drug use: Never   • Sexual activity: Not Currently   Other Topics Concern   • None   Social History Narrative    Annual eye exam: Follows Ophthalmology     Social Determinants of Health     Financial Resource Strain: Not on file   Food Insecurity: Not on file   Transportation Needs: Not on file   Physical Activity: Not on file   Stress: Not on file   Social Connections: Not on file   Intimate Partner Violence: Not on file   Housing Stability: Not on file      Medications and Allergies:     Current Outpatient Medications   Medication Sig Dispense Refill   • Ascorbic Acid (Vitamin C) 500 MG CAPS Take by mouth     • aspirin 81 mg chewable tablet Chew 81 mg daily     • calcium carbonate (OS-HUMBERTO) 600 MG tablet Take 600 mg by mouth 2 (two) times a day with meals 500mg BID     • levothyroxine 25 mcg tablet TAKE ONE TABLET BY MOUTH ONCE DAILY (Patient taking differently: 25 mcg Patient takes every other day) 90 tablet 0   • Lutein 10 MG TABS Take by mouth     • metoprolol tartrate (LOPRESSOR) 50 mg tablet TAKE ONE TABLET BY MOUTH TWICE DAILY 180 tablet 0   • Multiple Vitamins-Minerals (multivitamin with minerals) tablet Take 1 tablet by mouth daily     • omega-3-acid ethyl esters (LOVAZA) 1 g capsule Take 2 g by mouth 2 (two) times a day     • zinc gluconate 50 mg tablet Take 50 mg by mouth daily     • fluticasone (FLONASE) 50 mcg/act nasal spray 2 sprays into each nostril daily (Patient not taking: Reported on 1/20/2023) 9 9 mL 0     No current facility-administered medications for this visit       Allergies   Allergen Reactions   • Actonel [Risedronate Sodium] Fatigue   • Amlodipine Fatigue   • Orudis [Ketoprofen] Fatigue      Immunizations:     Immunization History   Administered Date(s) Administered   • COVID-19 MODERNA VACC 0 5 ML IM 01/25/2021, 02/22/2021   • INFLUENZA 10/30/2015, 11/03/2016, 09/14/2017, 10/11/2018   • Influenza, high dose seasonal 0 7 mL 10/22/2020, 09/15/2021, 10/28/2022   • Pneumococcal Conjugate 13-Valent 11/17/2016   • Tdap 02/22/2020, 09/03/2021      Health Maintenance:         Topic Date Due   • Breast Cancer Screening: Mammogram  04/09/2022         Topic Date Due   • Pneumococcal Vaccine: 65+ Years (2 - PPSV23 if available, else PCV20) 11/17/2017   • COVID-19 Vaccine (3 - Booster for [employer] series) 04/19/2021      Medicare Screening Tests and Risk Assessments:     Addie Melendez is here for her Subsequent Wellness visit  Health Risk Assessment:   Patient rates overall health as fair  Patient feels that their physical health rating is same  Patient is satisfied with their life  Eyesight was rated as slightly worse  Hearing was rated as same  Patient feels that their emotional and mental health rating is same  Patients states they are never, rarely angry  Patient states they are often unusually tired/fatigued  Pain experienced in the last 7 days has been none  Patient states that she has experienced no weight loss or gain in last 6 months  Depression Screening:   PHQ-2 Score: 0      Fall Risk Screening: In the past year, patient has experienced: no history of falling in past year      Urinary Incontinence Screening:   Patient has leaked urine accidently in the last six months  Home Safety:  Patient has trouble with stairs inside or outside of their home  Patient has working smoke alarms and has working carbon monoxide detector  Home safety hazards include: none  Nutrition:   Current diet is No Added Salt  Medications:   Patient is currently taking over-the-counter supplements   OTC medications include: see medication list  Patient is able to manage medications  Activities of Daily Living (ADLs)/Instrumental Activities of Daily Living (IADLs):   Walk and transfer into and out of bed and chair?: Yes  Dress and groom yourself?: Yes    Bathe or shower yourself?: Yes    Feed yourself? Yes  Do your laundry/housekeeping?: No  Manage your money, pay your bills and track your expenses?: No  Make your own meals?: Yes    Do your own shopping?: No    ADL comments: Patient's children help with living activities patient can not complete on her own     Previous Hospitalizations:   Any hospitalizations or ED visits within the last 12 months?: Yes      Advance Care Planning:   Living will: Yes    Durable POA for healthcare: Yes    Advanced directive: Yes    Advanced directive counseling given: Yes    Patient declined ACP directive: No    End of Life Decisions reviewed with patient: Yes      Cognitive Screening:   Provider or family/friend/caregiver concerned regarding cognition?: No    PREVENTIVE SCREENINGS      Cardiovascular Screening:    General: Screening Current      Diabetes Screening:     General: Screening Current      Colorectal Cancer Screening:     General: Screening Not Indicated      Breast Cancer Screening:     General: History Breast Cancer and Screening Current      Cervical Cancer Screening:    General: Screening Not Indicated      Osteoporosis Screening:    General: Screening Not Indicated and History Osteoporosis      Abdominal Aortic Aneurysm (AAA) Screening:        General: Screening Not Indicated      Lung Cancer Screening:     General: Screening Not Indicated    Screening, Brief Intervention, and Referral to Treatment (SBIRT)    Screening  Typical number of drinks in a day: 0  Typical number of drinks in a week: 0  Interpretation: Low risk drinking behavior  Brief Intervention  Alcohol & drug use screenings were reviewed  No concerns regarding substance use disorder identified       Other Counseling Topics:   Car/seat belt/driving safety and calcium and vitamin D intake and regular weightbearing exercise  No results found       Physical Exam:     /82 (BP Location: Left arm, Patient Position: Sitting, Cuff Size: Adult)   Pulse 73   Temp 98 7 °F (37 1 °C) (Tympanic)   Resp 16   SpO2 97%     Physical Exam     Ashly Lance MD

## 2023-01-20 NOTE — ASSESSMENT & PLAN NOTE
Lab Results   Component Value Date    EGFR 45 06/30/2022    EGFR 36 04/09/2022    EGFR 37 10/16/2021    CREATININE 1 04 06/30/2022    CREATININE 1 25 04/09/2022    CREATININE 1 23 (H) 10/16/2021   Renal function stable  Advised to maintain hydration  We will follow electrolyte and renal function

## 2023-01-20 NOTE — ASSESSMENT & PLAN NOTE
She has 1+ pedal edema of both lower extremities most likely dependent  She was on HCTZ prior to her pneumonia  She is off HCTZ as her blood pressure was low during hospitalization  Discussed with the patient to restart HCTZ 12 5 mg once a day  Keep legs elevated  Wear elastic stockings during daytime

## 2023-01-20 NOTE — PATIENT INSTRUCTIONS
Medicare Preventive Visit Patient Instructions  Thank you for completing your Welcome to Medicare Visit or Medicare Annual Wellness Visit today  Your next wellness visit will be due in one year (1/21/2024)  The screening/preventive services that you may require over the next 5-10 years are detailed below  Some tests may not apply to you based off risk factors and/or age  Screening tests ordered at today's visit but not completed yet may show as past due  Also, please note that scanned in results may not display below  Preventive Screenings:  Service Recommendations Previous Testing/Comments   Colorectal Cancer Screening  * Colonoscopy    * Fecal Occult Blood Test (FOBT)/Fecal Immunochemical Test (FIT)  * Fecal DNA/Cologuard Test  * Flexible Sigmoidoscopy Age: 39-70 years old   Colonoscopy: every 10 years (may be performed more frequently if at higher risk)  OR  FOBT/FIT: every 1 year  OR  Cologuard: every 3 years  OR  Sigmoidoscopy: every 5 years  Screening may be recommended earlier than age 39 if at higher risk for colorectal cancer  Also, an individualized decision between you and your healthcare provider will decide whether screening between the ages of 74-80 would be appropriate  Colonoscopy: 11/26/2002  FOBT/FIT: Not on file  Cologuard: Not on file  Sigmoidoscopy: Not on file    Screening Not Indicated     Breast Cancer Screening Age: 36 years old  Frequency: every 1-2 years  Not required if history of left and right mastectomy Mammogram: 04/09/2021    History Breast Cancer   Cervical Cancer Screening Between the ages of 21-29, pap smear recommended once every 3 years  Between the ages of 33-67, can perform pap smear with HPV co-testing every 5 years     Recommendations may differ for women with a history of total hysterectomy, cervical cancer, or abnormal pap smears in past  Pap Smear: Not on file    Screening Not Indicated   Hepatitis C Screening Once for adults born between [facility] frequently in patients at high risk for Hepatitis C Hep C Antibody: Not on file        Diabetes Screening 1-2 times per year if you're at risk for diabetes or have pre-diabetes Fasting glucose: 88 mg/dL (4/9/2022)  A1C: No results in last 5 years (No results in last 5 years)  Screening Current   Cholesterol Screening Once every 5 years if you don't have a lipid disorder  May order more often based on risk factors  Lipid panel: Not on file          Other Preventive Screenings Covered by Medicare:  Abdominal Aortic Aneurysm (AAA) Screening: covered once if your at risk  You're considered to be at risk if you have a family history of AAA  Lung Cancer Screening: covers low dose CT scan once per year if you meet all of the following conditions: (1) Age 50-69; (2) No signs or symptoms of lung cancer; (3) Current smoker or have quit smoking within the last 15 years; (4) You have a tobacco smoking history of at least 20 pack years (packs per day multiplied by number of years you smoked); (5) You get a written order from a healthcare provider  Glaucoma Screening: covered annually if you're considered high risk: (1) You have diabetes OR (2) Family history of glaucoma OR (3)  aged 48 and older OR (3)  American aged 72 and older  Osteoporosis Screening: covered every 2 years if you meet one of the following conditions: (1) You're estrogen deficient and at risk for osteoporosis based off medical history and other findings; (2) Have a vertebral abnormality; (3) On glucocorticoid therapy for more than 3 months; (4) Have primary hyperparathyroidism; (5) On osteoporosis medications and need to assess response to drug therapy  Last bone density test (DXA Scan): Not on file  HIV Screening: covered annually if you're between the age of 12-76  Also covered annually if you are younger than 13 and older than 72 with risk factors for HIV infection   For pregnant patients, it is covered up to 3 times per pregnancy  Immunizations:  Immunization Recommendations   Influenza Vaccine Annual influenza vaccination during flu season is recommended for all persons aged >= 6 months who do not have contraindications   Pneumococcal Vaccine   * Pneumococcal conjugate vaccine = PCV13 (Prevnar 13), PCV15 (Vaxneuvance), PCV20 (Prevnar 20)  * Pneumococcal polysaccharide vaccine = PPSV23 (Pneumovax) Adults 25-60 years old: 1-3 doses may be recommended based on certain risk factors  Adults 72 years old: 1-2 doses may be recommended based off what pneumonia vaccine you previously received   Hepatitis B Vaccine 3 dose series if at intermediate or high risk (ex: diabetes, end stage renal disease, liver disease)   Tetanus (Td) Vaccine - COST NOT COVERED BY MEDICARE PART B Following completion of primary series, a booster dose should be given every 10 years to maintain immunity against tetanus  Td may also be given as tetanus wound prophylaxis  Tdap Vaccine - COST NOT COVERED BY MEDICARE PART B Recommended at least once for all adults  For pregnant patients, recommended with each pregnancy  Shingles Vaccine (Shingrix) - COST NOT COVERED BY MEDICARE PART B  2 shot series recommended in those aged 48 and above     Health Maintenance Due:      Topic Date Due    Breast Cancer Screening: Mammogram  04/09/2022     Immunizations Due:      Topic Date Due    Pneumococcal Vaccine: 65+ Years (2 - PPSV23 if available, else PCV20) 11/17/2017    COVID-19 Vaccine (3 - Booster for [place] series) 04/19/2021     Advance Directives   What are advance directives? Advance directives are legal documents that state your wishes and plans for medical care  These plans are made ahead of time in case you lose your ability to make decisions for yourself  Advance directives can apply to any medical decision, such as the treatments you want, and if you want to donate organs  What are the types of advance directives?   There are many types of advance directives, and each state has rules about how to use them  You may choose a combination of any of the following:  Living will: This is a written record of the treatment you want  You can also choose which treatments you do not want, which to limit, and which to stop at a certain time  This includes surgery, medicine, IV fluid, and tube feedings  Durable power of  for [facility]): This is a written record that states who you want to make healthcare choices for you when you are unable to make them for yourself  This person, called a proxy, is usually a family member or a friend  You may choose more than 1 proxy  Do not resuscitate (DNR) order:  A DNR order is used in case your heart stops beating or you stop breathing  It is a request not to have certain forms of treatment, such as CPR  A DNR order may be included in other types of advance directives  Medical directive: This covers the care that you want if you are in a coma, near death, or unable to make decisions for yourself  You can list the treatments you want for each condition  Treatment may include pain medicine, surgery, blood transfusions, dialysis, IV or tube feedings, and a ventilator (breathing machine)  Values history: This document has questions about your views, beliefs, and how you feel and think about life  This information can help others choose the care that you would choose  Why are advance directives important? An advance directive helps you control your care  Although spoken wishes may be used, it is better to have your wishes written down  Spoken wishes can be misunderstood, or not followed  Treatments may be given even if you do not want them  An advance directive may make it easier for your family to make difficult choices about your care  Urinary Incontinence   Urinary incontinence (UI)  is when you lose control of your bladder  UI develops because your bladder cannot store or empty urine properly   The 3 most common types of UI are stress incontinence, urge incontinence, or both  Medicines:   May be given to help strengthen your bladder control  Report any side effects of medication to your healthcare provider  Do pelvic muscle exercises often:  Your pelvic muscles help you stop urinating  Squeeze these muscles tight for 5 seconds, then relax for 5 seconds  Gradually work up to squeezing for 10 seconds  Do 3 sets of 15 repetitions a day, or as directed  This will help strengthen your pelvic muscles and improve bladder control  Train your bladder:  Go to the bathroom at set times, such as every 2 hours, even if you do not feel the urge to go  You can also try to hold your urine when you feel the urge to go  For example, hold your urine for 5 minutes when you feel the urge to go  As that becomes easier, hold your urine for 10 minutes  Self-care:   Keep a UI record  Write down how often you leak urine and how much you leak  Make a note of what you were doing when you leaked urine  Drink liquids as directed  You may need to limit the amount of liquid you drink to help control your urine leakage  Do not drink any liquid right before you go to bed  Limit or do not have drinks that contain caffeine or alcohol  Prevent constipation  Eat a variety of high-fiber foods  Good examples are high-fiber cereals, beans, vegetables, and whole-grain breads  Walking is the best way to trigger your intestines to have a bowel movement  Exercise regularly and maintain a healthy weight  Weight loss and exercise will decrease pressure on your bladder and help you control your leakage  Use a catheter as directed  to help empty your bladder  A catheter is a tiny, plastic tube that is put into your bladder to drain your urine  Go to behavior therapy as directed  Behavior therapy may be used to help you learn to control your urge to urinate      Weight Management   Why it is important to manage your weight:  Being overweight increases your risk of health conditions such as heart disease, high blood pressure, type 2 diabetes, and certain types of cancer  It can also increase your risk for osteoarthritis, sleep apnea, and other respiratory problems  Aim for a slow, steady weight loss  Even a small amount of weight loss can lower your risk of health problems  How to lose weight safely:  A safe and healthy way to lose weight is to eat fewer calories and get regular exercise  You can lose up about 1 pound a week by decreasing the number of calories you eat by 500 calories each day  Healthy meal plan for weight management:  A healthy meal plan includes a variety of foods, contains fewer calories, and helps you stay healthy  A healthy meal plan includes the following:  Eat whole-grain foods more often  A healthy meal plan should contain fiber  Fiber is the part of grains, fruits, and vegetables that is not broken down by your body  Whole-grain foods are healthy and provide extra fiber in your diet  Some examples of whole-grain foods are whole-wheat breads and pastas, oatmeal, brown rice, and bulgur  Eat a variety of vegetables every day  Include dark, leafy greens such as spinach, kale, sue greens, and mustard greens  Eat yellow and orange vegetables such as carrots, sweet potatoes, and winter squash  Eat a variety of fruits every day  Choose fresh or canned fruit (canned in its own juice or light syrup) instead of juice  Fruit juice has very little or no fiber  Eat low-fat dairy foods  Drink fat-free (skim) milk or 1% milk  Eat fat-free yogurt and low-fat cottage cheese  Try low-fat cheeses such as mozzarella and other reduced-fat cheeses  Choose meat and other protein foods that are low in fat  Choose beans or other legumes such as split peas or lentils  Choose fish, skinless poultry (chicken or turkey), or lean cuts of red meat (beef or pork)  Before you cook meat or poultry, cut off any visible fat  Use less fat and oil  Try baking foods instead of frying them  Add less fat, such as margarine, sour cream, regular salad dressing and mayonnaise to foods  Eat fewer high-fat foods  Some examples of high-fat foods include french fries, doughnuts, ice cream, and cakes  Eat fewer sweets  Limit foods and drinks that are high in sugar  This includes candy, cookies, regular soda, and sweetened drinks  Exercise:  Exercise at least 30 minutes per day on most days of the week  Some examples of exercise include walking, biking, dancing, and swimming  You can also fit in more physical activity by taking the stairs instead of the elevator or parking farther away from stores  Ask your healthcare provider about the best exercise plan for you  © Copyright [employer] 2018 Information is for End User's use only and may not be sold, redistributed or otherwise used for commercial purposes  All illustrations and images included in CareNotes® are the copyrighted property of [employer]  or [address] [place] [address]  Patient was advised to continue present medications  discussed with the patient medications and laboratory data in detail  Follow-up with me in 4 months or as advised  If any blood test was ordered please do 1 week prior to next appointment unless advise to get earlier    If you have any questions please call the office [phone]

## 2023-01-20 NOTE — ASSESSMENT & PLAN NOTE
Patient  was advised to decrease portion size  Advised to decrease carb, sugar, cholesterol intake  Advised to exercise 3-5 times per week as much as she can do it advised to lose weight

## 2023-01-20 NOTE — ASSESSMENT & PLAN NOTE
She gets shortness of breath when she goes up hills or up stairs  Discussed with the patient to have a cardiac evaluation as she has a murmur also  Chest x-ray revealed resolving of her pneumonia and possible mild interstitial changes could be mild edema  Although clinically she does not look volume overloaded  She has a chronic edema of lower extremities  Patient does not want to see a specialist   Will observe for now

## 2023-01-20 NOTE — PROGRESS NOTES
Assessment/Plan:    1  Medicare annual wellness visit, subsequent    2  Essential hypertension  Assessment & Plan:  Blood pressure well controlled  Advised to continue present medication  Advised for low-salt diet  Orders:  -     Basic metabolic panel; Future    3  Acquired hypothyroidism  Assessment & Plan:  Her TSH was low 0 22 so she was advised to change her levothyroxine 25 mcg daily to every other day  We will follow TSH  Orders:  -     TSH, 3rd generation; Future    4  Stage 3b chronic kidney disease [facility])  Assessment & Plan:  Lab Results   Component Value Date    EGFR 45 06/30/2022    EGFR 36 04/09/2022    EGFR 37 10/16/2021    CREATININE 1 04 06/30/2022    CREATININE 1 25 04/09/2022    CREATININE 1 23 (H) 10/16/2021   Renal function stable  Advised to maintain hydration  We will follow electrolyte and renal function  Orders:  -     Basic metabolic panel; Future    5  Heart murmur  Assessment & Plan:  As mentioned above she was advised to see a cardiologist but she does not want to see any specialist   So will observe  6  SOB (shortness of breath) on exertion  Assessment & Plan:  She gets shortness of breath when she goes up hills or up stairs  Discussed with the patient to have a cardiac evaluation as she has a murmur also  Chest x-ray revealed resolving of her pneumonia and possible mild interstitial changes could be mild edema  Although clinically she does not look volume overloaded  She has a chronic edema of lower extremities  Patient does not want to see a specialist   Will observe for now  7  Edema of both lower legs  Assessment & Plan:  She has 1+ pedal edema of both lower extremities most likely dependent  She was on HCTZ prior to her pneumonia  She is off HCTZ as her blood pressure was low during hospitalization  Discussed with the patient to restart HCTZ 12 5 mg once a day  Keep legs elevated  Wear elastic stockings during daytime      Orders:  -     Basic metabolic panel; Future    8  BMI 29 0-29 9,adult  Assessment & Plan:  Patient  was advised to decrease portion size  Advised to decrease carb, sugar, cholesterol intake  Advised to exercise 3-5 times per week as much as she can do it advised to lose weight  9  Malignant neoplasm of nipple of left breast in female, unspecified estrogen receptor status ([facility] )  Assessment & Plan:  She has been getting a yearly mammogram and she would like to continue as well  No recurrence  10  Pneumonia of right middle lobe due to infectious organism  Assessment & Plan:  She had a repeat chest x-ray revealed almost resolution of her pneumonia  11  Macular degeneration of both eyes, unspecified type  Assessment & Plan:  She has a poor vision due to macular degeneration  She is not driving  Had seen an ophthalmologist             Subjective: Patient presents for annual wellness exam as well as follow-up her medical problems  Patient ID: Antolin Delacruz is a 80 y o  female  HPI   60-year-old white female patient presents for annual wellness exam as well as follow-up her medical problems  She denies any chest pain  She gets shortness of breath on exertion mainly when she goes up hills or up stairs  She denies any cough, fever, chills  Denies nausea vomiting diarrhea  Denies pain in abdomen  Overall she is feeling better    She is here with her daughter    The following portions of the patient's history were reviewed and updated as appropriate:     Past Medical History:  She has a past medical history of Abdominal pain, LLQ (10/22/2021), Acute rhinitis (04/22/2022), Age related osteoporosis (01/22/2021), Arthritis, BMI 28 0-28 9,adult (01/22/2021), BMI 29 0-29 9,adult (7/22/2022), Cancer ([facility] ), Chest pain (7/22/2022), CKD (chronic kidney disease), Colonoscopy refused, Diverticulitis of colon, Dizziness, DJD (degenerative joint disease), Edema of both lower legs (01/22/2021), Encounter for support and coordination of transition of care (12/5/2022), Essential hypertension (01/22/2021), GE reflux, General weakness, Heart murmur (10/21/2022), Heart murmur, aortic, Heart murmur, aortic (10/22/2021), Hematoma of scalp (09/15/2021), History of breast cancer, Hypercalcemia, Hyperkalemia (01/22/2021), Hypertension, Hypothyroidism (07/23/2021), Laceration of right thumb (09/15/2021), Left shoulder pain (7/22/2022), Left shoulder pain (8/12/2022), Leukocytosis (01/21/2022), Macular degeneration, Medicare annual wellness visit, subsequent (1/20/2023), Muscular deconditioning (12/5/2022), Nausea (12/29/2021), Neck pain (7/22/2022), Nodule of left lung (7/22/2022), Numbness and tingling (07/23/2021), Osteoporosis, Other chest pain (7/22/2022), Over weight (10/21/2022), Overweight, Pneumonia due to infectious organism (12/5/2022), Primary osteoarthritis (01/22/2021), Sinobronchitis (11/1/2022), Sinus headache (12/29/2021), Skin lesion, SOB (shortness of breath) (12/29/2021), SOB (shortness of breath) on exertion (1/20/2023), Stasis edema of both lower extremities, and URI (upper respiratory infection) (04/22/2022)  ,  _______________________________________________________________________  Past Surgical History:   has a past surgical history that includes Breast surgery; Squamous cell carcinoma excision; Cholecystectomy; Total hip arthroplasty (Bilateral); Total shoulder replacement (Right); Abdominal hysterectomy; Mastectomy (Left); Colonoscopy (07/18/2011); DXA procedure(historical) (01/29/2014); Mammo (historical) (10/05/2020); IR kyphoplasty/vertebroplasty; and LITHOLINK CKD PROGRAM (HISTORICAL)  ,  _______________________________________________________________________  Family History:  family history includes Lung cancer in her father ,  _______________________________________________________________________  Social History:   reports that she has never smoked   She has never used smokeless tobacco  She reports current alcohol use of about 1 0 standard drink per week  She reports that she does not use drugs  ,  _______________________________________________________________________  Allergies:  is allergic to actonel [risedronate sodium], amlodipine, and orudis [ketoprofen]     _______________________________________________________________________  Current Outpatient Medications   Medication Sig Dispense Refill   • Ascorbic Acid (Vitamin C) 500 MG CAPS Take by mouth     • aspirin 81 mg chewable tablet Chew 81 mg daily     • calcium carbonate (OS-HUMBERTO) 600 MG tablet Take 600 mg by mouth 2 (two) times a day with meals 500mg BID     • levothyroxine 25 mcg tablet TAKE ONE TABLET BY MOUTH ONCE DAILY (Patient taking differently: 25 mcg Patient takes every other day) 90 tablet 0   • Lutein 10 MG TABS Take by mouth     • metoprolol tartrate (LOPRESSOR) 50 mg tablet TAKE ONE TABLET BY MOUTH TWICE DAILY 180 tablet 0   • Multiple Vitamins-Minerals (multivitamin with minerals) tablet Take 1 tablet by mouth daily     • omega-3-acid ethyl esters (LOVAZA) 1 g capsule Take 2 g by mouth 2 (two) times a day     • zinc gluconate 50 mg tablet Take 50 mg by mouth daily       No current facility-administered medications for this visit      _______________________________________________________________________  Review of Systems   Constitutional: Negative for chills, fatigue and fever  HENT: Negative for congestion, ear pain, hearing loss, nosebleeds, sinus pain, sore throat and trouble swallowing  Eyes: Positive for visual disturbance ( She has a poor vision due to macular degeneration  )  Negative for discharge and redness  Respiratory: Positive for shortness of breath ( On exertion when she goes upstairs or up hills  )  Negative for cough and chest tightness  Cardiovascular: Positive for leg swelling ( Has some edema of lower extremities with)  Negative for chest pain and palpitations     Gastrointestinal: Negative for abdominal pain, blood in stool, constipation, diarrhea, nausea and vomiting  Genitourinary: Negative for dysuria, flank pain, frequency and hematuria  Musculoskeletal: Negative for arthralgias, myalgias and neck pain  Skin: Negative for color change and rash  Neurological: Negative for dizziness, speech difficulty, weakness and headaches  Hematological: Does not bruise/bleed easily  Psychiatric/Behavioral: Negative for agitation and behavioral problems  Objective:  Vitals:    01/20/23 0823   BP: 128/82   BP Location: Left arm   Patient Position: Sitting   Cuff Size: Adult   Pulse: 73   Resp: 16   Temp: 98 7 °F (37 1 °C)   TempSrc: Tympanic   SpO2: 97%     There is no height or weight on file to calculate BMI  Physical Exam  Vitals and nursing note reviewed  Constitutional:       General: She is not in acute distress  Appearance: Normal appearance  HENT:      Head: Normocephalic and atraumatic  Right Ear: Ear canal and external ear normal       Left Ear: Ear canal and external ear normal       Nose: Nose normal       Mouth/Throat:      Mouth: Mucous membranes are moist    Eyes:      General: No scleral icterus  Right eye: No discharge  Left eye: No discharge  Extraocular Movements: Extraocular movements intact  Conjunctiva/sclera: Conjunctivae normal    Cardiovascular:      Rate and Rhythm: Normal rate and regular rhythm  Pulses: Normal pulses  Heart sounds: Murmur heard  Pulmonary:      Effort: Pulmonary effort is normal  No respiratory distress  Breath sounds: Normal breath sounds  Abdominal:      General: Bowel sounds are normal       Palpations: Abdomen is soft  Tenderness: There is no abdominal tenderness  Musculoskeletal:         General: Normal range of motion  Cervical back: Normal range of motion and neck supple  No muscular tenderness  Right lower leg: Edema ( 1+ pedal edema  Kelley [place]' sign negative) present        Left lower leg: Edema ( 1+ edema  Luberta Almo is negative ) present  Comments: She is ambulating without assistance or cane  Skin:     General: Skin is warm  Findings: No rash  Neurological:      General: No focal deficit present  Mental Status: She is alert and oriented to person, place, and time  Motor: No weakness        Coordination: Coordination normal    Psychiatric:         Mood and Affect: Mood normal          Behavior: Behavior normal

## 2023-02-03 PROBLEM — J18.9 PNEUMONIA DUE TO INFECTIOUS ORGANISM: Status: RESOLVED | Noted: 2022-12-05 | Resolved: 2023-02-03

## 2023-03-03 DIAGNOSIS — I10 ESSENTIAL HYPERTENSION: Chronic | ICD-10-CM

## 2023-03-03 RX ORDER — METOPROLOL TARTRATE 50 MG/1
TABLET, FILM COATED ORAL
Qty: 180 TABLET | Refills: 0 | Status: SHIPPED | OUTPATIENT
Start: 2023-03-03

## 2023-03-21 PROBLEM — Z00.00 MEDICARE ANNUAL WELLNESS VISIT, SUBSEQUENT: Status: RESOLVED | Noted: 2023-01-20 | Resolved: 2023-03-21

## 2023-05-06 ENCOUNTER — APPOINTMENT (OUTPATIENT)
Dept: LAB | Facility: HOSPITAL | Age: 88
End: 2023-05-06
Attending: INTERNAL MEDICINE

## 2023-05-06 DIAGNOSIS — I10 ESSENTIAL HYPERTENSION: Chronic | ICD-10-CM

## 2023-05-06 DIAGNOSIS — R60.0 EDEMA OF BOTH LOWER LEGS: Chronic | ICD-10-CM

## 2023-05-06 DIAGNOSIS — N18.31 STAGE 3A CHRONIC KIDNEY DISEASE (HCC): Chronic | ICD-10-CM

## 2023-05-06 DIAGNOSIS — E03.9 ACQUIRED HYPOTHYROIDISM: ICD-10-CM

## 2023-05-06 DIAGNOSIS — N18.32 STAGE 3B CHRONIC KIDNEY DISEASE (HCC): Chronic | ICD-10-CM

## 2023-05-06 DIAGNOSIS — D72.829 LEUKOCYTOSIS, UNSPECIFIED TYPE: ICD-10-CM

## 2023-05-06 LAB
ANION GAP SERPL CALCULATED.3IONS-SCNC: 10 MMOL/L (ref 4–13)
BASOPHILS # BLD AUTO: 0.1 THOUSANDS/ÂΜL (ref 0–0.1)
BASOPHILS NFR BLD AUTO: 1 % (ref 0–1)
BUN SERPL-MCNC: 22 MG/DL (ref 5–25)
CALCIUM SERPL-MCNC: 9.9 MG/DL (ref 8.4–10.2)
CHLORIDE SERPL-SCNC: 103 MMOL/L (ref 96–108)
CO2 SERPL-SCNC: 26 MMOL/L (ref 21–32)
CREAT SERPL-MCNC: 1.11 MG/DL (ref 0.6–1.3)
EOSINOPHIL # BLD AUTO: 0.71 THOUSAND/ÂΜL (ref 0–0.61)
EOSINOPHIL NFR BLD AUTO: 6 % (ref 0–6)
ERYTHROCYTE [DISTWIDTH] IN BLOOD BY AUTOMATED COUNT: 14.2 % (ref 11.6–15.1)
GFR SERPL CREATININE-BSD FRML MDRD: 41 ML/MIN/1.73SQ M
GLUCOSE P FAST SERPL-MCNC: 89 MG/DL (ref 65–99)
HCT VFR BLD AUTO: 43.1 % (ref 34.8–46.1)
HGB BLD-MCNC: 14.6 G/DL (ref 11.5–15.4)
IMM GRANULOCYTES # BLD AUTO: 0.05 THOUSAND/UL (ref 0–0.2)
IMM GRANULOCYTES NFR BLD AUTO: 0 % (ref 0–2)
LYMPHOCYTES # BLD AUTO: 2.23 THOUSANDS/ÂΜL (ref 0.6–4.47)
LYMPHOCYTES NFR BLD AUTO: 20 % (ref 14–44)
MCH RBC QN AUTO: 30.5 PG (ref 26.8–34.3)
MCHC RBC AUTO-ENTMCNC: 33.9 G/DL (ref 31.4–37.4)
MCV RBC AUTO: 90 FL (ref 82–98)
MONOCYTES # BLD AUTO: 0.88 THOUSAND/ÂΜL (ref 0.17–1.22)
MONOCYTES NFR BLD AUTO: 8 % (ref 4–12)
NEUTROPHILS # BLD AUTO: 7.44 THOUSANDS/ÂΜL (ref 1.85–7.62)
NEUTS SEG NFR BLD AUTO: 65 % (ref 43–75)
NRBC BLD AUTO-RTO: 0 /100 WBCS
PLATELET # BLD AUTO: 296 THOUSANDS/UL (ref 149–390)
PMV BLD AUTO: 9.2 FL (ref 8.9–12.7)
POTASSIUM SERPL-SCNC: 4.3 MMOL/L (ref 3.5–5.3)
RBC # BLD AUTO: 4.79 MILLION/UL (ref 3.81–5.12)
SODIUM SERPL-SCNC: 139 MMOL/L (ref 135–147)
TSH SERPL DL<=0.05 MIU/L-ACNC: 4.51 UIU/ML (ref 0.45–4.5)
WBC # BLD AUTO: 11.41 THOUSAND/UL (ref 4.31–10.16)

## 2023-05-11 ENCOUNTER — APPOINTMENT (OUTPATIENT)
Dept: LAB | Facility: HOSPITAL | Age: 88
End: 2023-05-11
Attending: INTERNAL MEDICINE

## 2023-05-11 ENCOUNTER — OFFICE VISIT (OUTPATIENT)
Dept: INTERNAL MEDICINE CLINIC | Facility: CLINIC | Age: 88
End: 2023-05-11

## 2023-05-11 ENCOUNTER — HOSPITAL ENCOUNTER (OUTPATIENT)
Dept: RADIOLOGY | Facility: HOSPITAL | Age: 88
Discharge: HOME | End: 2023-05-11

## 2023-05-11 VITALS
RESPIRATION RATE: 18 BRPM | HEIGHT: 67 IN | WEIGHT: 179 LBS | HEART RATE: 60 BPM | OXYGEN SATURATION: 97 % | BODY MASS INDEX: 28.09 KG/M2 | TEMPERATURE: 98.4 F | SYSTOLIC BLOOD PRESSURE: 126 MMHG | DIASTOLIC BLOOD PRESSURE: 80 MMHG

## 2023-05-11 DIAGNOSIS — R06.02 SOB (SHORTNESS OF BREATH) ON EXERTION: ICD-10-CM

## 2023-05-11 DIAGNOSIS — D72.828 OTHER ELEVATED WHITE BLOOD CELL (WBC) COUNT: ICD-10-CM

## 2023-05-11 DIAGNOSIS — R35.0 URINARY FREQUENCY: ICD-10-CM

## 2023-05-11 DIAGNOSIS — R53.83 OTHER FATIGUE: ICD-10-CM

## 2023-05-11 DIAGNOSIS — I10 ESSENTIAL HYPERTENSION: Chronic | ICD-10-CM

## 2023-05-11 DIAGNOSIS — N18.32 STAGE 3B CHRONIC KIDNEY DISEASE (HCC): Chronic | ICD-10-CM

## 2023-05-11 DIAGNOSIS — R61 NIGHT SWEAT: ICD-10-CM

## 2023-05-11 DIAGNOSIS — E03.9 ACQUIRED HYPOTHYROIDISM: ICD-10-CM

## 2023-05-11 DIAGNOSIS — R06.02 SOB (SHORTNESS OF BREATH) ON EXERTION: Primary | ICD-10-CM

## 2023-05-11 LAB
BACTERIA UR QL AUTO: ABNORMAL /HPF
BILIRUB UR QL STRIP: NEGATIVE
CLARITY UR: CLEAR
COLOR UR: YELLOW
GLUCOSE UR STRIP-MCNC: NEGATIVE MG/DL
HGB UR QL STRIP.AUTO: NEGATIVE
KETONES UR STRIP-MCNC: NEGATIVE MG/DL
LEUKOCYTE ESTERASE UR QL STRIP: ABNORMAL
NITRITE UR QL STRIP: NEGATIVE
NON-SQ EPI CELLS URNS QL MICRO: ABNORMAL /HPF
PH UR STRIP.AUTO: 6.5 [PH]
PROT UR STRIP-MCNC: NEGATIVE MG/DL
RBC #/AREA URNS AUTO: ABNORMAL /HPF
SP GR UR STRIP.AUTO: <=1.005 (ref 1–1.03)
UROBILINOGEN UR QL STRIP.AUTO: 0.2 E.U./DL
WBC #/AREA URNS AUTO: ABNORMAL /HPF

## 2023-05-11 RX ORDER — METOPROLOL TARTRATE 50 MG/1
50 TABLET, FILM COATED ORAL 2 TIMES DAILY
Qty: 180 TABLET | Refills: 1 | Status: SHIPPED | OUTPATIENT
Start: 2023-05-11

## 2023-05-11 NOTE — ASSESSMENT & PLAN NOTE
She does have a chronic urinary frequency could be overactive bladder  She has a leukocytosis so we will order a urine for UA and C&S make sure there is no any infection causing her symptoms

## 2023-05-11 NOTE — ASSESSMENT & PLAN NOTE
Her TSH is elevated to 4 51  she  feels tired and generalized weakness  She takes levothyroxine 25 program every other day  Advised to increase to levothyroxine 25 mcg every day  We will follow TSH

## 2023-05-11 NOTE — ASSESSMENT & PLAN NOTE
Patient complain of getting night sweats  EKG unremarkable  She is afebrile  Blood test reviewed  Will order UA UA C&S to rule out any UTI and causing her symptoms  Will order chest x-ray  TSH elevated so dose of her levothyroxine increased to about

## 2023-05-11 NOTE — PATIENT INSTRUCTIONS
Patient was advised to continue present medications  discussed with the patient medications and laboratory data in detail  Follow-up with me as advised  If any blood test was ordered please do 1 week prior to next appointment unless advise to get earlier    If you have any questions please call the office [phone]

## 2023-05-11 NOTE — ASSESSMENT & PLAN NOTE
Complaint of feeling tired and generalized weakness  CBC revealed leukocytosis normal hemoglobin  Electrolytes renal function stable  TSH elevated so we will increase her levothyroxine dose as above  Will order urine for UA and CNS to rule out any underlying infection  Will order chest x-ray as above

## 2023-05-11 NOTE — ASSESSMENT & PLAN NOTE
Patient complain of getting shortness of breath on exertion although denies any associated chest pain  She is getting some night sweats  Denies any cough  Denies any fever  EKG was done in the office today revealed normal sinus rhythm has a left ventricular hypertrophy with repolarization and left axis deviation unchanged from June 2022  Will order chest x-ray  Her pulse ox is 97% room air  She is afebrile

## 2023-05-11 NOTE — ASSESSMENT & PLAN NOTE
Lab Results   Component Value Date    EGFR 41 05/06/2023    EGFR 45 06/30/2022    EGFR 36 04/09/2022    CREATININE 1 11 05/06/2023    CREATININE 1 04 06/30/2022    CREATININE 1 25 04/09/2022   Her renal function stable  Advised to continue present medication and maintain hydration

## 2023-05-11 NOTE — ASSESSMENT & PLAN NOTE
WBC 11 4 with normal hemoglobin platelet counts  Rule out infection as above versus other etiology  Will observe and follow

## 2023-05-11 NOTE — PROGRESS NOTES
Assessment/Plan:    1  SOB (shortness of breath) on exertion  Assessment & Plan:  Patient complain of getting shortness of breath on exertion although denies any associated chest pain  She is getting some night sweats  Denies any cough  Denies any fever  EKG was done in the office today revealed normal sinus rhythm has a left ventricular hypertrophy with repolarization and left axis deviation unchanged from June 2022  Will order chest x-ray  Her pulse ox is 97% room air  She is afebrile  Orders:  -     POCT ECG  -     XR chest pa & lateral; Future; Expected date: 05/11/2023    2  Other fatigue  Assessment & Plan:  Complaint of feeling tired and generalized weakness  CBC revealed leukocytosis normal hemoglobin  Electrolytes renal function stable  TSH elevated so we will increase her levothyroxine dose as above  Will order urine for UA and CNS to rule out any underlying infection  Will order chest x-ray as above  Orders:  -     UA w Reflex to Microscopic w Reflex to Culture -Lab Collect; Future    3  Night sweat  Assessment & Plan:  Patient complain of getting night sweats  EKG unremarkable  She is afebrile  Blood test reviewed  Will order UA UA C&S to rule out any UTI and causing her symptoms  Will order chest x-ray  TSH elevated so dose of her levothyroxine increased to about  Orders:  -     POCT ECG  -     UA w Reflex to Microscopic w Reflex to Culture -Lab Collect; Future    4  Essential hypertension  Assessment & Plan:  Blood pressure well controlled  Advised to continue present medication  Advised for low-salt diet  Orders:  -     POCT ECG  -     XR chest pa & lateral; Future; Expected date: 05/11/2023  -     metoprolol tartrate (LOPRESSOR) 50 mg tablet; Take 1 tablet (50 mg total) by mouth 2 (two) times a day    5  Acquired hypothyroidism  Assessment & Plan:  Her TSH is elevated to 4 51  she  feels tired and generalized weakness    She takes levothyroxine 25 program every other day   Advised to increase to levothyroxine 25 mcg every day  We will follow TSH  Orders:  -     TSH, 3rd generation; Future    6  Urinary frequency  Assessment & Plan:  She does have a chronic urinary frequency could be overactive bladder  She has a leukocytosis so we will order a urine for UA and C&S make sure there is no any infection causing her symptoms  Orders:  -     UA w Reflex to Microscopic w Reflex to Culture -Lab Collect; Future    7  Other elevated white blood cell (WBC) count  Assessment & Plan:  WBC 11 4 with normal hemoglobin platelet counts  Rule out infection as above versus other etiology  Will observe and follow  Orders:  -     UA w Reflex to Microscopic w Reflex to Culture -Lab Collect; Future  -     CBC and differential; Future    8  Stage 3b chronic kidney disease [facility])  Assessment & Plan:  Lab Results   Component Value Date    EGFR 41 05/06/2023    EGFR 45 06/30/2022    EGFR 36 04/09/2022    CREATININE 1 11 05/06/2023    CREATININE 1 04 06/30/2022    CREATININE 1 25 04/09/2022   Her renal function stable  Advised to continue present medication and maintain hydration  Subjective: Patient presents with complaint of shortness of breath on exertion, night sweats, feeling tired and generalized weakness  Patient ID: Richa Yancey is a 80 y o  female  HPI   41-year-old white female patient presents with complaint of getting shortness of breath on exertion  She complains of night sweats and feeling tired and generalized weakness  She denies any fever  Denies any cough  Denies nausea vomiting diarrhea pain abdomen  Denies chest pain  States urinary frequency is somewhat chronic in nature      The following portions of the patient's history were reviewed and updated as appropriate:     Past Medical History:  She has a past medical history of Abdominal pain, LLQ (10/22/2021), Acute rhinitis (04/22/2022), Age related osteoporosis (01/22/2021), Arthritis, BMI 28 0-28 9,adult (01/22/2021), BMI 29 0-29 9,adult (7/22/2022), Cancer ([facility] [address] ), Chest pain (7/22/2022), CKD (chronic kidney disease), Colonoscopy refused, Diverticulitis of colon, Dizziness, DJD (degenerative joint disease), Edema of both lower legs (01/22/2021), Encounter for support and coordination of transition of care (12/5/2022), Essential hypertension (01/22/2021), GE reflux, General weakness, Heart murmur (10/21/2022), Heart murmur, aortic, Heart murmur, aortic (10/22/2021), Hematoma of scalp (09/15/2021), History of breast cancer, Hypercalcemia, Hyperkalemia (01/22/2021), Hypertension, Hypothyroidism (07/23/2021), Laceration of right thumb (09/15/2021), Left shoulder pain (7/22/2022), Left shoulder pain (8/12/2022), Leukocytosis (01/21/2022), Macular degeneration, Medicare annual wellness visit, subsequent (1/20/2023), Muscular deconditioning (12/5/2022), Nausea (12/29/2021), Neck pain (7/22/2022), Night sweat (5/11/2023), Nodule of left lung (7/22/2022), Numbness and tingling (07/23/2021), Osteoporosis, Other chest pain (7/22/2022), Other fatigue (5/11/2023), Over weight (10/21/2022), Overweight, Pneumonia due to infectious organism (12/5/2022), Primary osteoarthritis (01/22/2021), Sinobronchitis (11/1/2022), Sinus headache (12/29/2021), Skin lesion, SOB (shortness of breath) (12/29/2021), SOB (shortness of breath) on exertion (1/20/2023), Stasis edema of both lower extremities, URI (upper respiratory infection) (04/22/2022), and Urinary frequency (5/11/2023)  ,  _______________________________________________________________________  Past Surgical History:   has a past surgical history that includes Breast surgery; Squamous cell carcinoma excision; Cholecystectomy; Total hip arthroplasty (Bilateral); Total shoulder replacement (Right); Abdominal hysterectomy; Mastectomy (Left); Colonoscopy (07/18/2011); DXA procedure(historical) (01/29/2014); Mammo (historical) (10/05/2020);  IR kyphoplasty/vertebroplasty; and [address] [place] [address] PROGRAM (HISTORICAL)  ,  _______________________________________________________________________  Family History:  family history includes Lung cancer in her father ,  _______________________________________________________________________  Social History:   reports that she has never smoked  She has never used smokeless tobacco  She reports current alcohol use of about 1 0 standard drink per week  She reports that she does not use drugs  ,  _______________________________________________________________________  Allergies:  is allergic to actonel [risedronate sodium], amlodipine, and orudis [ketoprofen]     _______________________________________________________________________  Current Outpatient Medications   Medication Sig Dispense Refill   • Ascorbic Acid (Vitamin C) 500 MG CAPS Take by mouth     • aspirin 81 mg chewable tablet Chew 81 mg daily     • calcium carbonate (OS-HUMBERTO) 600 MG tablet Take 600 mg by mouth 2 (two) times a day with meals 500mg BID     • levothyroxine 25 mcg tablet TAKE ONE TABLET BY MOUTH ONCE DAILY (Patient taking differently: 25 mcg every other day Patient takes every other day) 90 tablet 0   • Lutein 10 MG TABS Take by mouth     • metoprolol tartrate (LOPRESSOR) 50 mg tablet Take 1 tablet (50 mg total) by mouth 2 (two) times a day 180 tablet 1   • Multiple Vitamins-Minerals (multivitamin with minerals) tablet Take 1 tablet by mouth daily     • omega-3-acid ethyl esters (LOVAZA) 1 g capsule Take 2 g by mouth 2 (two) times a day     • zinc gluconate 50 mg tablet Take 50 mg by mouth daily       No current facility-administered medications for this visit      _______________________________________________________________________  Review of Systems   Constitutional: Positive for fatigue  Negative for chills and fever  HENT: Negative for congestion, ear pain, hearing loss, nosebleeds, sinus pain, sore throat and trouble swallowing      Eyes: Negative for discharge, redness and "visual disturbance  Respiratory: Positive for shortness of breath  Negative for cough and chest tightness  Cardiovascular: Negative for chest pain and palpitations  Gastrointestinal: Negative for abdominal pain, blood in stool, constipation, diarrhea, nausea and vomiting  Genitourinary: Positive for frequency  Negative for dysuria, flank pain and hematuria  Musculoskeletal: Positive for arthralgias  Negative for myalgias and neck pain  Skin: Negative for color change and rash  Neurological: Positive for weakness ( Generalized weakness)  Negative for dizziness, speech difficulty and headaches  Hematological: Does not bruise/bleed easily  Psychiatric/Behavioral: Negative for agitation and behavioral problems  Objective:  Vitals:    05/11/23 1104   BP: 126/80   BP Location: Right arm   Patient Position: Sitting   Cuff Size: Adult   Pulse: 60   Resp: 18   Temp: 98 4 °F (36 9 °C)   TempSrc: Temporal   SpO2: 97%   Weight: 81 2 kg (179 lb)   Height: 5' 7\" (1 702 m)     Body mass index is 28 04 kg/m²  Physical Exam  Vitals and nursing note reviewed  Constitutional:       General: She is not in acute distress  Appearance: Normal appearance  HENT:      Head: Normocephalic and atraumatic  Nose: Nose normal       Mouth/Throat:      Pharynx: Oropharynx is clear  Eyes:      General: No scleral icterus  Right eye: No discharge  Left eye: No discharge  Conjunctiva/sclera: Conjunctivae normal    Cardiovascular:      Rate and Rhythm: Normal rate and regular rhythm  Pulses: Normal pulses  Heart sounds: Murmur heard  Pulmonary:      Effort: Pulmonary effort is normal  No respiratory distress  Breath sounds: Normal breath sounds  No wheezing, rhonchi or rales  Abdominal:      General: Bowel sounds are normal       Palpations: Abdomen is soft  Tenderness: There is no abdominal tenderness  Musculoskeletal:         General: Normal range of motion   " Cervical back: Normal range of motion and neck supple  No muscular tenderness  Right lower leg: No edema  Left lower leg: No edema  Skin:     General: Skin is warm  Findings: No rash  Neurological:      General: No focal deficit present  Mental Status: She is alert and oriented to person, place, and time  Motor: No weakness  Psychiatric:         Mood and Affect: Mood normal          Behavior: Behavior normal          I spent 30 minutes with the patient today    More than 50% time spent for reviewing of external notes, reviewing of the results of diagnostics test, management of care, patient education and ordering of test

## 2023-06-01 ENCOUNTER — TELEPHONE (OUTPATIENT)
Dept: INTERNAL MEDICINE CLINIC | Facility: CLINIC | Age: 88
End: 2023-06-01

## 2023-06-01 DIAGNOSIS — I10 ESSENTIAL HYPERTENSION: Chronic | ICD-10-CM

## 2023-06-01 RX ORDER — METOPROLOL TARTRATE 50 MG/1
50 TABLET, FILM COATED ORAL 2 TIMES DAILY
Qty: 180 TABLET | Refills: 1 | Status: SHIPPED | OUTPATIENT
Start: 2023-06-01

## 2023-06-01 NOTE — TELEPHONE ENCOUNTER
Patient daughter left the message in regards for the patient BP medication and to be fill because she will be all out  She said she spoke someone from the [address] [place] [address] stating it was not sent for fill  Spoke with [address] [place] [address] and confirmed medication status and the gentlemen stated the medication will be ready when its done doing the count  Made daughter aware of this and had no further questions

## 2023-06-01 NOTE — TELEPHONE ENCOUNTER
LM    This is Shantell Alba, a patient of Doctor Porter Man, and I would like my blood pressure metoprolol, 50 milligrams, twice a day of reordered for me from [facility] and my number is [phone]  Thank you   Bye

## 2023-08-29 ENCOUNTER — APPOINTMENT (OUTPATIENT)
Dept: LAB | Facility: HOSPITAL | Age: 88
End: 2023-08-29
Attending: INTERNAL MEDICINE
Payer: MEDICARE

## 2023-08-29 DIAGNOSIS — E03.9 ACQUIRED HYPOTHYROIDISM: ICD-10-CM

## 2023-08-29 DIAGNOSIS — D72.828 OTHER ELEVATED WHITE BLOOD CELL (WBC) COUNT: ICD-10-CM

## 2023-08-29 LAB
BASOPHILS # BLD AUTO: 0.1 THOUSANDS/ÂΜL (ref 0–0.1)
BASOPHILS NFR BLD AUTO: 1 % (ref 0–1)
EOSINOPHIL # BLD AUTO: 0.83 THOUSAND/ÂΜL (ref 0–0.61)
EOSINOPHIL NFR BLD AUTO: 9 % (ref 0–6)
ERYTHROCYTE [DISTWIDTH] IN BLOOD BY AUTOMATED COUNT: 14.4 % (ref 11.6–15.1)
HCT VFR BLD AUTO: 43.6 % (ref 34.8–46.1)
HGB BLD-MCNC: 14.7 G/DL (ref 11.5–15.4)
IMM GRANULOCYTES # BLD AUTO: 0.04 THOUSAND/UL (ref 0–0.2)
IMM GRANULOCYTES NFR BLD AUTO: 0 % (ref 0–2)
LYMPHOCYTES # BLD AUTO: 2.36 THOUSANDS/ÂΜL (ref 0.6–4.47)
LYMPHOCYTES NFR BLD AUTO: 25 % (ref 14–44)
MCH RBC QN AUTO: 30.6 PG (ref 26.8–34.3)
MCHC RBC AUTO-ENTMCNC: 33.7 G/DL (ref 31.4–37.4)
MCV RBC AUTO: 91 FL (ref 82–98)
MONOCYTES # BLD AUTO: 0.68 THOUSAND/ÂΜL (ref 0.17–1.22)
MONOCYTES NFR BLD AUTO: 7 % (ref 4–12)
NEUTROPHILS # BLD AUTO: 5.41 THOUSANDS/ÂΜL (ref 1.85–7.62)
NEUTS SEG NFR BLD AUTO: 58 % (ref 43–75)
NRBC BLD AUTO-RTO: 0 /100 WBCS
PLATELET # BLD AUTO: 230 THOUSANDS/UL (ref 149–390)
PMV BLD AUTO: 9.4 FL (ref 8.9–12.7)
RBC # BLD AUTO: 4.81 MILLION/UL (ref 3.81–5.12)
TSH SERPL DL<=0.05 MIU/L-ACNC: 5.93 UIU/ML (ref 0.45–4.5)
WBC # BLD AUTO: 9.42 THOUSAND/UL (ref 4.31–10.16)

## 2023-08-29 PROCEDURE — 85025 COMPLETE CBC W/AUTO DIFF WBC: CPT

## 2023-08-29 PROCEDURE — 84443 ASSAY THYROID STIM HORMONE: CPT

## 2023-08-29 PROCEDURE — 36415 COLL VENOUS BLD VENIPUNCTURE: CPT

## 2023-09-01 ENCOUNTER — OFFICE VISIT (OUTPATIENT)
Dept: INTERNAL MEDICINE CLINIC | Facility: CLINIC | Age: 88
End: 2023-09-01
Payer: MEDICARE

## 2023-09-01 VITALS
DIASTOLIC BLOOD PRESSURE: 84 MMHG | BODY MASS INDEX: 28.09 KG/M2 | SYSTOLIC BLOOD PRESSURE: 136 MMHG | OXYGEN SATURATION: 99 % | WEIGHT: 179 LBS | HEIGHT: 67 IN | TEMPERATURE: 97.6 F | HEART RATE: 64 BPM

## 2023-09-01 DIAGNOSIS — G89.29 CHRONIC LEFT SHOULDER PAIN: ICD-10-CM

## 2023-09-01 DIAGNOSIS — R01.1 HEART MURMUR: ICD-10-CM

## 2023-09-01 DIAGNOSIS — M25.512 CHRONIC LEFT SHOULDER PAIN: ICD-10-CM

## 2023-09-01 DIAGNOSIS — R06.02 SOB (SHORTNESS OF BREATH) ON EXERTION: ICD-10-CM

## 2023-09-01 DIAGNOSIS — E03.9 ACQUIRED HYPOTHYROIDISM: Primary | ICD-10-CM

## 2023-09-01 DIAGNOSIS — D72.828 OTHER ELEVATED WHITE BLOOD CELL (WBC) COUNT: ICD-10-CM

## 2023-09-01 DIAGNOSIS — C50.012 MALIGNANT NEOPLASM OF NIPPLE OF LEFT BREAST IN FEMALE, UNSPECIFIED ESTROGEN RECEPTOR STATUS (HCC): ICD-10-CM

## 2023-09-01 DIAGNOSIS — H35.30 MACULAR DEGENERATION OF BOTH EYES, UNSPECIFIED TYPE: Chronic | ICD-10-CM

## 2023-09-01 DIAGNOSIS — N18.32 STAGE 3B CHRONIC KIDNEY DISEASE (HCC): Chronic | ICD-10-CM

## 2023-09-01 DIAGNOSIS — I10 ESSENTIAL HYPERTENSION: Chronic | ICD-10-CM

## 2023-09-01 PROCEDURE — 99214 OFFICE O/P EST MOD 30 MIN: CPT | Performed by: INTERNAL MEDICINE

## 2023-09-01 RX ORDER — METOPROLOL TARTRATE 50 MG/1
50 TABLET, FILM COATED ORAL 2 TIMES DAILY
Qty: 180 TABLET | Refills: 1 | Status: SHIPPED | OUTPATIENT
Start: 2023-09-01

## 2023-09-01 RX ORDER — LEVOTHYROXINE SODIUM 0.03 MG/1
25 TABLET ORAL SEE ADMIN INSTRUCTIONS
Qty: 135 TABLET | Refills: 1 | Status: SHIPPED | OUTPATIENT
Start: 2023-09-01

## 2023-09-01 RX ORDER — LEVOTHYROXINE SODIUM 0.03 MG/1
25 TABLET ORAL DAILY
COMMUNITY
End: 2023-09-01 | Stop reason: SDUPTHER

## 2023-09-01 RX ORDER — ACETAMINOPHEN 500 MG
500 TABLET ORAL 2 TIMES DAILY PRN
COMMUNITY

## 2023-09-01 NOTE — ASSESSMENT & PLAN NOTE
Has a chronic left shoulder pain most likely from arthritis. She had seen orthopedic specialist in the past.  Now she takes acetaminophen as needed for pain mainly at night. She does not want any physical therapy or orthopedic evaluation.

## 2023-09-01 NOTE — PROGRESS NOTES
Assessment/Plan:    1. Acquired hypothyroidism  Assessment & Plan:  TSH increased from 4.5-5.9 so advised to increase the levothyroxine from 25 mcg once a day to 25 mcg alternating with 50 mcg daily. Check TSH next time. Orders:  -     levothyroxine 25 mcg tablet; Take 1 tablet (25 mcg total) by mouth see administration instructions Take 1 tablet alternating with 2 tablets daily.  -     TSH, 3rd generation; Future    2. Essential hypertension  Assessment & Plan:  Blood pressure elevated today. She takes metoprolol 50 mg twice a day. Discussed with the patient will observe at this time meanwhile try to watch diet for salt intake and lose weight. If persistently elevated will need to add another medication next time. Orders:  -     metoprolol tartrate (LOPRESSOR) 50 mg tablet; Take 1 tablet (50 mg total) by mouth 2 (two) times a day  -     Comprehensive metabolic panel; Future    3. Stage 3b chronic kidney disease [facility])  Assessment & Plan:  Lab Results   Component Value Date    EGFR 41 05/06/2023    EGFR 45 06/30/2022    EGFR 36 04/09/2022    CREATININE 1.11 05/06/2023    CREATININE 1.04 06/30/2022    CREATININE 1.25 04/09/2022   Renal function stable. Advised to maintain hydration. We will follow electrolyte renal function. Orders:  -     Comprehensive metabolic panel; Future    4. Heart murmur  Assessment & Plan:  She had seen cardiology in the past.  But now she does not want to see any cardiologist for any further evaluation. She does have a exertional shortness of breath particularly when she goes up steps or hills could be from cardiac versus deconditioning. Chest x-ray unremarkable denies any pulmonary symptoms      5. BMI 28.0-28.9,adult    6. SOB (shortness of breath) on exertion  Assessment & Plan:  See detailed under heart murmur assessment and plan. Orders:  -     Comprehensive metabolic panel; Future    7.  Other elevated white blood cell (WBC) count  Assessment & Plan:  Leukocytosis resolved. White cell count back to normal hemoglobin and platelet counts normal.      8. Chronic left shoulder pain  Assessment & Plan:  Has a chronic left shoulder pain most likely from arthritis. She had seen orthopedic specialist in the past.  Now she takes acetaminophen as needed for pain mainly at night. She does not want any physical therapy or orthopedic evaluation. 9. Malignant neoplasm of nipple of left breast in female, unspecified estrogen receptor status ([address])  Assessment & Plan:  She gets yearly mammogram.  Last mammogram April 2023. 10. Macular degeneration of both eyes, unspecified type  Assessment & Plan:  She is a poor vision due to macular degeneration. She does not drive. Had seen an ophthalmologist       BMI Counseling: Body mass index is 28.04 kg/m². The BMI is above normal. Nutrition recommendations include decreasing portion sizes, decreasing fast food intake, consuming healthier snacks, limiting drinks that contain sugar, moderation in carbohydrate intake, reducing intake of saturated and trans fat and reducing intake of cholesterol. Exercise recommendations include exercising 3-5 times per week. No pharmacotherapy was ordered. Rationale for BMI follow-up plan is due to patient being overweight or obese. Advised to exercise as much as she can. She is ambulating without any assistance. Advised to use cane when she goes out for balance and safety. Subjective: Patient presents for follow-up. Patient ID: Dee Courtney is a 80 y.o. female. HPI  49-year-old white female patient presents to follow-up of her medical problems. She is here with her daughter. She denies any chest pain. Complain of shortness of breath particularly when she goes up steps or up hills. Denies any cough, fever, chills. Denies nausea vomiting diarrhea or pain in abdomen. Chronic left shoulder pain and also gets neck pain for which she takes acetaminophen mainly at nighttime.       The following portions of the patient's history were reviewed and updated as appropriate:     Past Medical History:  She has a past medical history of Abdominal pain, LLQ (10/22/2021), Acute rhinitis (04/22/2022), Age related osteoporosis (01/22/2021), Arthritis, BMI 28.0-28.9,adult (01/22/2021), BMI 29.0-29.9,adult (7/22/2022), Cancer ([address]), Chest pain (7/22/2022), CKD (chronic kidney disease), Colonoscopy refused, Diverticulitis of colon, Dizziness, DJD (degenerative joint disease), Edema of both lower legs (01/22/2021), Encounter for support and coordination of transition of care (12/5/2022), Essential hypertension (01/22/2021), GE reflux, General weakness, Heart murmur (10/21/2022), Heart murmur, aortic, Heart murmur, aortic (10/22/2021), Hematoma of scalp (09/15/2021), History of breast cancer, Hypercalcemia, Hyperkalemia (01/22/2021), Hypertension, Hypothyroidism (07/23/2021), Laceration of right thumb (09/15/2021), Left shoulder pain (7/22/2022), Left shoulder pain (8/12/2022), Leukocytosis (01/21/2022), Macular degeneration, Medicare annual wellness visit, subsequent (1/20/2023), Muscular deconditioning (12/5/2022), Nausea (12/29/2021), Neck pain (7/22/2022), Night sweat (5/11/2023), Nodule of left lung (7/22/2022), Numbness and tingling (07/23/2021), Osteoporosis, Other chest pain (7/22/2022), Other fatigue (5/11/2023), Over weight (10/21/2022), Overweight, Pneumonia due to infectious organism (12/5/2022), Primary osteoarthritis (01/22/2021), Sinobronchitis (11/1/2022), Sinus headache (12/29/2021), Skin lesion, SOB (shortness of breath) (12/29/2021), SOB (shortness of breath) on exertion (1/20/2023), Stasis edema of both lower extremities, URI (upper respiratory infection) (04/22/2022), and Urinary frequency (5/11/2023). ,  _______________________________________________________________________  Past Surgical History:   has a past surgical history that includes Breast surgery;  Squamous cell carcinoma excision; Cholecystectomy; Total hip arthroplasty (Bilateral); Total shoulder replacement (Right); Abdominal hysterectomy; Mastectomy (Left); Colonoscopy (07/18/2011); DXA procedure(historical) (01/29/2014); Mammo (historical) (10/05/2020); IR kyphoplasty/vertebroplasty; and LITHOLINK CKD PROGRAM (HISTORICAL). ,  _______________________________________________________________________  Family History:  family history includes Lung cancer in her father.,  _______________________________________________________________________  Social History:   reports that she has never smoked. She has never used smokeless tobacco. She reports current alcohol use of about 1.0 standard drink of alcohol per week. She reports that she does not use drugs. ,  _______________________________________________________________________  Allergies:  is allergic to actonel [risedronate sodium], amlodipine, and orudis [ketoprofen]. .  _______________________________________________________________________  Current Outpatient Medications   Medication Sig Dispense Refill   • acetaminophen (TYLENOL) 500 mg tablet Take 500 mg by mouth 2 (two) times a day as needed     • levothyroxine 25 mcg tablet Take 1 tablet (25 mcg total) by mouth see administration instructions Take 1 tablet alternating with 2 tablets daily.  135 tablet 1   • metoprolol tartrate (LOPRESSOR) 50 mg tablet Take 1 tablet (50 mg total) by mouth 2 (two) times a day 180 tablet 1   • Ascorbic Acid (Vitamin C) 500 MG CAPS Take by mouth     • aspirin 81 mg chewable tablet Chew 81 mg daily     • calcium carbonate (OS-HUMBERTO) 600 MG tablet Take 600 mg by mouth 2 (two) times a day with meals 500mg BID     • Lutein 10 MG TABS Take by mouth     • Multiple Vitamins-Minerals (multivitamin with minerals) tablet Take 1 tablet by mouth daily     • omega-3-acid ethyl esters (LOVAZA) 1 g capsule Take 2 g by mouth 2 (two) times a day     • zinc gluconate 50 mg tablet Take 50 mg by mouth daily       No current facility-administered medications for this visit.     _______________________________________________________________________  Review of Systems   Constitutional: Negative for chills and fever. HENT: Negative for congestion, ear pain, hearing loss, nosebleeds, sinus pain, sore throat and trouble swallowing. Eyes: Positive for visual disturbance ( Has a poor vision due to macular degeneration. ). Negative for discharge and redness. Respiratory: Positive for shortness of breath ( On exertion mainly when she goes up steps up hills. ). Negative for cough and chest tightness. Cardiovascular: Negative for chest pain and palpitations. Gastrointestinal: Negative for abdominal pain, blood in stool, constipation, diarrhea, nausea and vomiting. Genitourinary: Negative for dysuria, flank pain and hematuria. Musculoskeletal: Positive for arthralgias ( She gets left shoulder pain chronic.) and neck pain ( Sometimes she gets neck pain ). Negative for myalgias. Skin: Negative for color change and rash. Neurological: Negative for dizziness, speech difficulty, weakness and headaches. Hematological: Does not bruise/bleed easily. Psychiatric/Behavioral: Negative for agitation and behavioral problems. Objective:  Vitals:    09/01/23 0830   BP: 136/84   Pulse: 64   Temp: 97.6 °F (36.4 °C)   SpO2: 99%   Weight: 81.2 kg (179 lb)   Height: 5' 7" (1.702 m)     Body mass index is 28.04 kg/m². Physical Exam  Vitals and nursing note reviewed. Constitutional:       General: She is not in acute distress. Appearance: Normal appearance. HENT:      Head: Normocephalic and atraumatic. Right Ear: External ear normal.      Nose: Nose normal.      Mouth/Throat:      Mouth: Mucous membranes are moist.   Eyes:      General: No scleral icterus. Right eye: No discharge. Left eye: No discharge. Extraocular Movements: Extraocular movements intact.       Conjunctiva/sclera: Conjunctivae

## 2023-09-01 NOTE — ASSESSMENT & PLAN NOTE
Blood pressure elevated today. She takes metoprolol 50 mg twice a day. Discussed with the patient will observe at this time meanwhile try to watch diet for salt intake and lose weight. If persistently elevated will need to add another medication next time.

## 2023-09-01 NOTE — ASSESSMENT & PLAN NOTE
Lab Results   Component Value Date    EGFR 41 05/06/2023    EGFR 45 06/30/2022    EGFR 36 04/09/2022    CREATININE 1.11 05/06/2023    CREATININE 1.04 06/30/2022    CREATININE 1.25 04/09/2022   Renal function stable. Advised to maintain hydration. We will follow electrolyte renal function.

## 2023-09-01 NOTE — ASSESSMENT & PLAN NOTE
She had seen cardiology in the past.  But now she does not want to see any cardiologist for any further evaluation. She does have a exertional shortness of breath particularly when she goes up steps or hills could be from cardiac versus deconditioning.   Chest x-ray unremarkable denies any pulmonary symptoms

## 2023-09-01 NOTE — PATIENT INSTRUCTIONS
Patient was advised to continue present medications. discussed with the patient medications and laboratory data in detail. Follow-up with me in 4 months or as advised. If any blood test was ordered please do 1 week prior to next appointment unless advise to get earlier.   If you have any questions please call the office [phone]

## 2023-09-01 NOTE — ASSESSMENT & PLAN NOTE
TSH increased from 4.5-5.9 so advised to increase the levothyroxine from 25 mcg once a day to 25 mcg alternating with 50 mcg daily. Check TSH next time.

## 2023-09-29 ENCOUNTER — CLINICAL SUPPORT (OUTPATIENT)
Dept: INTERNAL MEDICINE CLINIC | Facility: CLINIC | Age: 88
End: 2023-09-29
Payer: MEDICARE

## 2023-09-29 DIAGNOSIS — Z23 NEED FOR PROPHYLACTIC VACCINATION AND INOCULATION AGAINST INFLUENZA: Primary | ICD-10-CM

## 2023-09-29 PROCEDURE — G0008 ADMIN INFLUENZA VIRUS VAC: HCPCS | Performed by: INTERNAL MEDICINE

## 2023-09-29 PROCEDURE — 90662 IIV NO PRSV INCREASED AG IM: CPT | Performed by: INTERNAL MEDICINE

## 2024-01-06 ENCOUNTER — APPOINTMENT (OUTPATIENT)
Dept: LAB | Facility: HOSPITAL | Age: 89
End: 2024-01-06
Attending: INTERNAL MEDICINE
Payer: MEDICARE

## 2024-01-06 DIAGNOSIS — I10 ESSENTIAL HYPERTENSION: Chronic | ICD-10-CM

## 2024-01-06 DIAGNOSIS — N18.32 STAGE 3B CHRONIC KIDNEY DISEASE (HCC): Chronic | ICD-10-CM

## 2024-01-06 DIAGNOSIS — E03.9 ACQUIRED HYPOTHYROIDISM: ICD-10-CM

## 2024-01-06 DIAGNOSIS — R06.02 SOB (SHORTNESS OF BREATH) ON EXERTION: ICD-10-CM

## 2024-01-06 LAB
ALBUMIN SERPL BCP-MCNC: 4.2 G/DL (ref 3.5–5)
ALP SERPL-CCNC: 94 U/L (ref 34–104)
ALT SERPL W P-5'-P-CCNC: 15 U/L (ref 7–52)
ANION GAP SERPL CALCULATED.3IONS-SCNC: 9 MMOL/L
AST SERPL W P-5'-P-CCNC: 24 U/L (ref 13–39)
BILIRUB SERPL-MCNC: 0.75 MG/DL (ref 0.2–1)
BUN SERPL-MCNC: 22 MG/DL (ref 5–25)
CALCIUM SERPL-MCNC: 10.2 MG/DL (ref 8.4–10.2)
CHLORIDE SERPL-SCNC: 102 MMOL/L (ref 96–108)
CO2 SERPL-SCNC: 28 MMOL/L (ref 21–32)
CREAT SERPL-MCNC: 1.07 MG/DL (ref 0.6–1.3)
GFR SERPL CREATININE-BSD FRML MDRD: 43 ML/MIN/1.73SQ M
GLUCOSE P FAST SERPL-MCNC: 94 MG/DL (ref 65–99)
POTASSIUM SERPL-SCNC: 4.9 MMOL/L (ref 3.5–5.3)
PROT SERPL-MCNC: 7.3 G/DL (ref 6.4–8.4)
SODIUM SERPL-SCNC: 139 MMOL/L (ref 135–147)
TSH SERPL DL<=0.05 MIU/L-ACNC: 3.12 UIU/ML (ref 0.45–4.5)

## 2024-01-06 PROCEDURE — 80053 COMPREHEN METABOLIC PANEL: CPT

## 2024-01-06 PROCEDURE — 84443 ASSAY THYROID STIM HORMONE: CPT

## 2024-01-06 PROCEDURE — 36415 COLL VENOUS BLD VENIPUNCTURE: CPT

## 2024-01-26 ENCOUNTER — TELEPHONE (OUTPATIENT)
Dept: INTERNAL MEDICINE CLINIC | Facility: CLINIC | Age: 89
End: 2024-01-26

## 2024-01-26 NOTE — TELEPHONE ENCOUNTER
LM on VM to see if she can move apt to 2/8 @ 11:20 am - Holding apt.  DO NOT CANCEL 1/30/24 apt.  Just let us know if she can take the 2/8 apt.

## 2024-01-30 ENCOUNTER — OFFICE VISIT (OUTPATIENT)
Dept: INTERNAL MEDICINE CLINIC | Facility: CLINIC | Age: 89
End: 2024-01-30
Payer: MEDICARE

## 2024-01-30 VITALS
OXYGEN SATURATION: 97 % | DIASTOLIC BLOOD PRESSURE: 90 MMHG | SYSTOLIC BLOOD PRESSURE: 150 MMHG | TEMPERATURE: 97.5 F | HEART RATE: 80 BPM | RESPIRATION RATE: 18 BRPM

## 2024-01-30 DIAGNOSIS — E03.8 OTHER SPECIFIED HYPOTHYROIDISM: ICD-10-CM

## 2024-01-30 DIAGNOSIS — C50.012 MALIGNANT NEOPLASM OF NIPPLE OF LEFT BREAST IN FEMALE, UNSPECIFIED ESTROGEN RECEPTOR STATUS (HCC): ICD-10-CM

## 2024-01-30 DIAGNOSIS — Z00.00 MEDICARE ANNUAL WELLNESS VISIT, SUBSEQUENT: Primary | ICD-10-CM

## 2024-01-30 DIAGNOSIS — H35.30 MACULAR DEGENERATION OF BOTH EYES, UNSPECIFIED TYPE: Chronic | ICD-10-CM

## 2024-01-30 DIAGNOSIS — I10 ESSENTIAL HYPERTENSION: Chronic | ICD-10-CM

## 2024-01-30 DIAGNOSIS — N18.32 STAGE 3B CHRONIC KIDNEY DISEASE (HCC): Chronic | ICD-10-CM

## 2024-01-30 PROCEDURE — 99213 OFFICE O/P EST LOW 20 MIN: CPT | Performed by: INTERNAL MEDICINE

## 2024-01-30 PROCEDURE — G0439 PPPS, SUBSEQ VISIT: HCPCS | Performed by: INTERNAL MEDICINE

## 2024-01-30 RX ORDER — HYDROCHLOROTHIAZIDE 12.5 MG/1
12.5 TABLET ORAL DAILY
Qty: 30 TABLET | Refills: 3 | Status: SHIPPED | OUTPATIENT
Start: 2024-01-30 | End: 2024-07-28

## 2024-01-30 NOTE — PATIENT INSTRUCTIONS
Patient was advised to continue present medications. discussed with the patient medications and laboratory data in detail.  Follow-up with me in 4 months or as advised.  If any blood test was ordered please do 1 week prior to next appointment unless advise to get earlier.  If you have any questions please call the office [phone]

## 2024-01-30 NOTE — PROGRESS NOTES
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  Will restart HCTZ as she had taken before.  Advised to take hydrochlorothiazide 12.5 mg 1 every other day if not affecting her urination then increase to 1 tablet daily.  Continue metoprolol as prescribed.  Advised for low-salt diet.  CKD stable.  Advised to maintain hydration.  She has macular degeneration of both eyes has been seen and followed by eye specialist every 3 months.  She does not drive anymore.  Has a history of malignant neoplasm of the left breast last mammogram April 2023 unremarkable.  Patient does not want any more mammograms.  Her THS therapeutic 3.11 so advised to continue same dose of levothyroxine.  Will follow BMP.  Advised to wear elastic stockings as she gets dependent edema around ankles.  Advised to exercise as much she can, portion control, watch diet for carbs intake and lose weight.  Preventive health issues were discussed with patient, and age appropriate screening tests were ordered as noted in patient's After Visit Summary.  Personalized health advice and appropriate referrals for health education or preventive services given if needed, as noted in patient's After Visit Summary.     History of Present Illness:     Patient presents for a Medicare Wellness Visit    HPI   89-year-old white female here for  annual wellness exam as well as follow-up of her medical problems.  She denies any chest pain, shortness of breath, pain in abdomen.  Denies any cough, fever, chills.  Denies any nausea vomiting diarrhea.  She has a very poor eyesight for which she has been seen and followed by eye doctor for macular degeneration.    Patient Care Team:  Clara Mcnally MD as PCP - General (Internal Medicine)     Review of Systems:     Review of Systems   Constitutional:  Negative for chills and fever.   HENT:  Negative for congestion, ear pain, hearing loss, nosebleeds, sinus pain, sore throat and trouble swallowing.    Eyes:  Positive for visual disturbance (Is a very poor eyesight due to macular degeneration.). Negative for discharge and redness.   Respiratory:  Negative for cough, chest tightness and shortness of breath.    Cardiovascular:  Negative for chest pain and palpitations.   Gastrointestinal:  Negative for abdominal pain, blood in stool, constipation, diarrhea, nausea and vomiting.   Genitourinary:  Negative for dysuria, flank pain and hematuria.   Musculoskeletal:  Positive for arthralgias. Negative for myalgias and neck pain.   Skin:  Negative for color change and rash.   Neurological:  Negative for dizziness, speech difficulty, weakness and headaches.   Hematological:  Does not bruise/bleed easily.   Psychiatric/Behavioral:  Negative for agitation and behavioral problems.           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)    Hypothyroidism    Numbness and tingling    Hematoma of scalp    Abdominal pain, LLQ    Heart murmur, aortic    Sinus headache    Nausea    SOB (shortness of breath)    Headache     Leukocytosis    URI (upper respiratory infection)    Acute rhinitis    Neck pain    Chest pain    BMI 29.0-29.9,adult    Other chest pain    Nodule of left lung    Left shoulder pain    Over weight    Heart murmur    Sinobronchitis    Encounter for support and coordination of transition of care    Muscular deconditioning    SOB (shortness of breath) on exertion    Other fatigue    Night sweat    Urinary frequency    Medicare annual wellness visit, subsequent      Past Medical and Surgical History:     Past Medical History:   Diagnosis Date    Abdominal pain, LLQ 10/22/2021    Acute rhinitis 04/22/2022    Age related osteoporosis 01/22/2021    Arthritis     BMI 28.0-28.9,adult 01/22/2021    BMI 29.0-29.9,adult 07/22/2022    Cancer (HCC)     Chest pain 07/22/2022    CKD (chronic kidney disease)     Colonoscopy refused     Diverticulitis of colon     Dizziness     DJD (degenerative joint disease)     Edema of both lower legs 01/22/2021    Encounter for support and coordination of transition of care 12/05/2022    Essential hypertension 01/22/2021    GE reflux     General weakness     Heart murmur 10/21/2022    Heart murmur, aortic     Heart murmur, aortic 10/22/2021    Hematoma of scalp 09/15/2021    History of breast cancer     Hypercalcemia     Hyperkalemia 01/22/2021    Hypertension     Hypothyroidism 07/23/2021    Laceration of right thumb 09/15/2021    Left shoulder pain 07/22/2022    Left shoulder pain 08/12/2022    Leukocytosis 01/21/2022    Macular degeneration     Medicare annual wellness visit, subsequent 01/20/2023    Medicare annual wellness visit, subsequent 01/30/2024    Muscular deconditioning 12/05/2022    Nausea 12/29/2021    Neck pain 07/22/2022    Night sweat 05/11/2023    Nodule of left lung 07/22/2022    Numbness and tingling 07/23/2021    Osteoporosis     Other chest pain 07/22/2022    Other fatigue 05/11/2023    Over weight 10/21/2022    Overweight     Pneumonia due to infectious organism  12/05/2022    Primary osteoarthritis 01/22/2021    Sinobronchitis 11/01/2022    Sinus headache 12/29/2021    Skin lesion     SOB (shortness of breath) 12/29/2021    SOB (shortness of breath) on exertion 01/20/2023    Stasis edema of both lower extremities     URI (upper respiratory infection) 04/22/2022    Urinary frequency 05/11/2023     Past Surgical History:   Procedure Laterality Date    ABDOMINAL HYSTERECTOMY      BREAST SURGERY      CHOLECYSTECTOMY      COLONOSCOPY  07/18/2011    Does not want any more    DXA PROCEDURE (HISTORICAL)  01/29/2014    Does not want any more    IR KYPHOPLASTY/VERTEBROPLASTY      LITHOLINK CKD PROGRAM (HISTORICAL)      MAMMO (HISTORICAL)  10/05/2020    Mammo and US     MASTECTOMY Left     SQUAMOUS CELL CARCINOMA EXCISION      Left forearm     TOTAL HIP ARTHROPLASTY Bilateral     TOTAL SHOULDER REPLACEMENT Right       Family History:     Family History   Problem Relation Age of Onset    Lung cancer Father       Social History:     Social History     Socioeconomic History    Marital status:      Spouse name: None    Number of children: None    Years of education: None    Highest education level: None   Occupational History    Occupation: Retired    Tobacco Use    Smoking status: Never    Smokeless tobacco: Never   Vaping Use    Vaping status: Never Used   Substance and Sexual Activity    Alcohol use: Yes     Alcohol/week: 1.0 standard drink of alcohol     Types: 1 Glasses of wine per week    Drug use: Never    Sexual activity: Not Currently   Other Topics Concern    None   Social History Narrative    Annual eye exam: Follows Ophthalmology     Social Determinants of Health     Financial Resource Strain: Low Risk  (1/30/2024)    Overall Financial Resource Strain (CARDIA)     Difficulty of Paying Living Expenses: Not hard at all   Food Insecurity: Not on file   Transportation Needs: No Transportation Needs (1/30/2024)    PRAPARE - Transportation     Lack of Transportation  (Medical): No     Lack of Transportation (Non-Medical): No   Physical Activity: Not on file   Stress: Not on file   Social Connections: Not on file   Intimate Partner Violence: Not on file   Housing Stability: Not on file      Medications and Allergies:     Current Outpatient Medications   Medication Sig Dispense Refill    Ascorbic Acid (Vitamin C) 500 MG CAPS Take by mouth      aspirin 81 mg chewable tablet Chew 81 mg daily      calcium carbonate (OS-HUMBERTO) 600 MG tablet Take 600 mg by mouth 2 (two) times a day with meals 500mg BID      hydroCHLOROthiazide 12.5 mg tablet Take 1 tablet (12.5 mg total) by mouth daily 30 tablet 3    levothyroxine 25 mcg tablet Take 1 tablet (25 mcg total) by mouth see administration instructions Take 1 tablet alternating with 2 tablets daily. 135 tablet 1    Lutein 10 MG TABS Take by mouth      metoprolol tartrate (LOPRESSOR) 50 mg tablet Take 1 tablet (50 mg total) by mouth 2 (two) times a day 180 tablet 1    Multiple Vitamins-Minerals (multivitamin with minerals) tablet Take 1 tablet by mouth daily      omega-3-acid ethyl esters (LOVAZA) 1 g capsule Take 2 g by mouth 2 (two) times a day      zinc gluconate 50 mg tablet Take 50 mg by mouth daily      acetaminophen (TYLENOL) 500 mg tablet Take 500 mg by mouth 2 (two) times a day as needed (Patient not taking: Reported on 1/30/2024)       No current facility-administered medications for this visit.     Allergies   Allergen Reactions    Actonel [Risedronate Sodium] Fatigue    Amlodipine Fatigue    Orudis [Ketoprofen] Fatigue      Immunizations:     Immunization History   Administered Date(s) Administered    COVID-19 MODERNA VACC 0.5 ML IM 01/25/2021, 02/22/2021    INFLUENZA 10/30/2015, 11/03/2016, 09/14/2017, 10/11/2018, 10/28/2022    Influenza, high dose seasonal 0.7 mL 10/22/2020, 09/15/2021, 10/28/2022, 09/29/2023    Pneumococcal Conjugate 13-Valent 11/17/2016    Tdap 02/22/2020, 09/03/2021      Health Maintenance:         Topic Date  Due    Breast Cancer Screening: Mammogram  04/09/2022         Topic Date Due    Pneumococcal Vaccine: 65+ Years (2 - PPSV23 or PCV20) 11/17/2017    COVID-19 Vaccine (3 - 2023-24 season) 09/01/2023      Medicare Screening Tests and Risk Assessments:     Jayna is here for her Subsequent Wellness visit.     Health Risk Assessment:   Patient rates overall health as good. Patient feels that their physical health rating is same. Patient is satisfied with their life. Eyesight was rated as slightly worse. Hearing was rated as slightly worse. Patient feels that their emotional and mental health rating is same. Patients states they are never, rarely angry. Patient states they are sometimes unusually tired/fatigued. Pain experienced in the last 7 days has been none. Patient states that she has experienced no weight loss or gain in last 6 months.     Depression Screening:   PHQ-2 Score: 0      Fall Risk Screening:   In the past year, patient has experienced: no history of falling in past year      Urinary Incontinence Screening:   Patient has leaked urine accidently in the last six months.     Home Safety:  Patient has trouble with stairs inside or outside of their home. Patient has working smoke alarms and has working carbon monoxide detector. Home safety hazards include: not having non-slip bath and/or shower mats. Patient needs railing for stairs. Patient uses a shower chair.     Nutrition:   Current diet is Limited junk food and No Added Salt.     Medications:   Patient is currently taking over-the-counter supplements. OTC medications include: see medication list. Patient is able to manage medications.     Activities of Daily Living (ADLs)/Instrumental Activities of Daily Living (IADLs):   Walk and transfer into and out of bed and chair?: Yes  Dress and groom yourself?: Yes    Bathe or shower yourself?: Yes    Feed yourself? Yes  Do your laundry/housekeeping?: Yes  Manage your money, pay your bills and track your  expenses?: No  Make your own meals?: Yes    Do your own shopping?: No    ADL comments: Daughter assists patient with money and shopping. Patient has macular degeneration.     Previous Hospitalizations:   Any hospitalizations or ED visits within the last 12 months?: No      Advance Care Planning:   Living will: Yes    Durable POA for healthcare: Yes    Advanced directive: Yes    Advanced directive counseling given: Yes    ACP document given: Yes    Patient declined ACP directive: No      Comments: His son Tarun is her POA    Cognitive Screening:   Provider or family/friend/caregiver concerned regarding cognition?: No    PREVENTIVE SCREENINGS      Cardiovascular Screening:    General: Screening Current      Diabetes Screening:     General: Screening Current      Colorectal Cancer Screening:     General: Screening Not Indicated      Breast Cancer Screening:     General: History Breast Cancer and Patient Declines      Cervical Cancer Screening:    General: Screening Not Indicated      Osteoporosis Screening:    General: Screening Not Indicated and History Osteoporosis      Abdominal Aortic Aneurysm (AAA) Screening:        General: Screening Not Indicated      Lung Cancer Screening:     General: Screening Not Indicated      Hepatitis C Screening:    General: Screening Current    Screening, Brief Intervention, and Referral to Treatment (SBIRT)    Screening  Typical number of drinks in a day: 0  Typical number of drinks in a week: 0  Interpretation: Low risk drinking behavior.    Single Item Drug Screening:  How often have you used an illegal drug (including marijuana) or a prescription medication for non-medical reasons in the past year? never    Single Item Drug Screen Score: 0  Interpretation: Negative screen for possible drug use disorder    Brief Intervention  Alcohol & drug use screenings were reviewed. No concerns regarding substance use disorder identified.     Other Counseling Topics:   Car/seat belt/driving  safety, skin self-exam, sunscreen and regular weightbearing exercise and calcium and vitamin D intake. She does not drive    No results found.     Physical Exam:     /90 (BP Location: Right arm, Patient Position: Sitting, Cuff Size: Standard)   Pulse 80   Temp 97.5 °F (36.4 °C) (Temporal)   Resp 18   SpO2 97%     Physical Exam  Vitals and nursing note reviewed.   Constitutional:       General: She is not in acute distress.     Appearance: Normal appearance. She is well-developed.   HENT:      Head: Normocephalic and atraumatic.      Right Ear: Ear canal and external ear normal.      Left Ear: Ear canal and external ear normal.      Nose: Nose normal.      Mouth/Throat:      Pharynx: Oropharynx is clear.   Eyes:      General: No scleral icterus.        Right eye: No discharge.         Left eye: No discharge.      Extraocular Movements: Extraocular movements intact.      Conjunctiva/sclera: Conjunctivae normal.   Cardiovascular:      Rate and Rhythm: Normal rate and regular rhythm.      Pulses: Normal pulses.      Heart sounds: Murmur heard.   Pulmonary:      Effort: Pulmonary effort is normal. No respiratory distress.      Breath sounds: Normal breath sounds. No wheezing.   Abdominal:      General: Bowel sounds are normal.      Palpations: Abdomen is soft.      Tenderness: There is no abdominal tenderness.   Musculoskeletal:         General: Deformity (She has some deformities of the thumbs due to osteoarthritis.) present. No swelling.      Cervical back: Normal range of motion and neck supple.      Right lower leg: Edema (Trace ankle edema) present.      Left lower leg: Edema (Trace ankle edema) present.      Comments: She has a DJD changes of the joints of the fingers.   Skin:     General: Skin is warm.      Capillary Refill: Capillary refill takes less than 2 seconds.      Findings: No rash.   Neurological:      General: No focal deficit present.      Mental Status: She is alert and oriented to person,  place, and time.   Psychiatric:         Mood and Affect: Mood normal.         Behavior: Behavior normal.          Clara Mcnally MD

## 2024-02-21 PROBLEM — Z00.00 MEDICARE ANNUAL WELLNESS VISIT, SUBSEQUENT: Status: RESOLVED | Noted: 2024-01-30 | Resolved: 2024-02-21

## 2024-02-29 DIAGNOSIS — E03.9 ACQUIRED HYPOTHYROIDISM: ICD-10-CM

## 2024-02-29 RX ORDER — LEVOTHYROXINE SODIUM 0.03 MG/1
25 TABLET ORAL SEE ADMIN INSTRUCTIONS
Qty: 135 TABLET | Refills: 1 | Status: SHIPPED | OUTPATIENT
Start: 2024-02-29

## 2024-03-03 DIAGNOSIS — I10 ESSENTIAL HYPERTENSION: Chronic | ICD-10-CM

## 2024-03-03 RX ORDER — METOPROLOL TARTRATE 50 MG/1
50 TABLET, FILM COATED ORAL 2 TIMES DAILY
Qty: 180 TABLET | Refills: 0 | Status: SHIPPED | OUTPATIENT
Start: 2024-03-03

## 2024-03-04 DIAGNOSIS — I10 ESSENTIAL HYPERTENSION: Chronic | ICD-10-CM

## 2024-03-04 RX ORDER — METOPROLOL TARTRATE 50 MG/1
50 TABLET, FILM COATED ORAL 2 TIMES DAILY
Qty: 180 TABLET | Refills: 0 | Status: CANCELLED | OUTPATIENT
Start: 2024-03-04

## 2024-05-04 ENCOUNTER — APPOINTMENT (OUTPATIENT)
Dept: LAB | Facility: HOSPITAL | Age: 89
End: 2024-05-04
Payer: MEDICARE

## 2024-05-04 DIAGNOSIS — N18.32 STAGE 3B CHRONIC KIDNEY DISEASE (HCC): Chronic | ICD-10-CM

## 2024-05-04 DIAGNOSIS — I10 ESSENTIAL HYPERTENSION: Chronic | ICD-10-CM

## 2024-05-04 LAB
ANION GAP SERPL CALCULATED.3IONS-SCNC: 10 MMOL/L (ref 4–13)
BUN SERPL-MCNC: 20 MG/DL (ref 5–25)
CALCIUM SERPL-MCNC: 10 MG/DL (ref 8.4–10.2)
CHLORIDE SERPL-SCNC: 104 MMOL/L (ref 96–108)
CO2 SERPL-SCNC: 26 MMOL/L (ref 21–32)
CREAT SERPL-MCNC: 1.03 MG/DL (ref 0.6–1.3)
GFR SERPL CREATININE-BSD FRML MDRD: 45 ML/MIN/1.73SQ M
GLUCOSE P FAST SERPL-MCNC: 86 MG/DL (ref 65–99)
POTASSIUM SERPL-SCNC: 4.3 MMOL/L (ref 3.5–5.3)
SODIUM SERPL-SCNC: 140 MMOL/L (ref 135–147)

## 2024-05-04 PROCEDURE — 80048 BASIC METABOLIC PNL TOTAL CA: CPT

## 2024-05-04 PROCEDURE — 36415 COLL VENOUS BLD VENIPUNCTURE: CPT

## 2024-05-06 ENCOUNTER — TELEPHONE (OUTPATIENT)
Dept: INTERNAL MEDICINE CLINIC | Facility: CLINIC | Age: 89
End: 2024-05-06

## 2024-05-06 NOTE — TELEPHONE ENCOUNTER
Patient's daughter Devi advised per communication consent. She verbalized understanding and will relay message to patient.

## 2024-05-06 NOTE — TELEPHONE ENCOUNTER
----- Message from Clara Mcnally MD sent at 5/5/2024 10:49 AM EDT -----  Please call her on Monday, 5/6/2024 that her blood test is okay.  Continue same medications.  No new changes.  To see me as scheduled.

## 2024-05-30 ENCOUNTER — OFFICE VISIT (OUTPATIENT)
Dept: INTERNAL MEDICINE CLINIC | Facility: CLINIC | Age: 89
End: 2024-05-30
Payer: MEDICARE

## 2024-05-30 VITALS
OXYGEN SATURATION: 98 % | SYSTOLIC BLOOD PRESSURE: 138 MMHG | DIASTOLIC BLOOD PRESSURE: 90 MMHG | HEART RATE: 68 BPM | TEMPERATURE: 98.1 F | RESPIRATION RATE: 18 BRPM

## 2024-05-30 DIAGNOSIS — N18.31 STAGE 3A CHRONIC KIDNEY DISEASE (HCC): Chronic | ICD-10-CM

## 2024-05-30 DIAGNOSIS — E03.9 ACQUIRED HYPOTHYROIDISM: ICD-10-CM

## 2024-05-30 DIAGNOSIS — E03.8 OTHER SPECIFIED HYPOTHYROIDISM: ICD-10-CM

## 2024-05-30 DIAGNOSIS — H35.30 MACULAR DEGENERATION OF BOTH EYES, UNSPECIFIED TYPE: Chronic | ICD-10-CM

## 2024-05-30 DIAGNOSIS — I10 ESSENTIAL HYPERTENSION: Primary | Chronic | ICD-10-CM

## 2024-05-30 DIAGNOSIS — R60.0 EDEMA OF BOTH LOWER LEGS: Chronic | ICD-10-CM

## 2024-05-30 DIAGNOSIS — R06.02 SOB (SHORTNESS OF BREATH) ON EXERTION: ICD-10-CM

## 2024-05-30 DIAGNOSIS — C50.012 MALIGNANT NEOPLASM OF NIPPLE OF LEFT BREAST IN FEMALE, UNSPECIFIED ESTROGEN RECEPTOR STATUS (HCC): ICD-10-CM

## 2024-05-30 DIAGNOSIS — R01.1 HEART MURMUR: ICD-10-CM

## 2024-05-30 PROCEDURE — G2211 COMPLEX E/M VISIT ADD ON: HCPCS | Performed by: INTERNAL MEDICINE

## 2024-05-30 PROCEDURE — 99214 OFFICE O/P EST MOD 30 MIN: CPT | Performed by: INTERNAL MEDICINE

## 2024-05-30 RX ORDER — METOPROLOL TARTRATE 50 MG/1
50 TABLET, FILM COATED ORAL 2 TIMES DAILY
Qty: 180 TABLET | Refills: 2 | Status: SHIPPED | OUTPATIENT
Start: 2024-05-30

## 2024-05-30 RX ORDER — HYDROCHLOROTHIAZIDE 12.5 MG/1
12.5 TABLET ORAL DAILY
Qty: 90 TABLET | Refills: 2 | Status: SHIPPED | OUTPATIENT
Start: 2024-05-30 | End: 2024-11-26

## 2024-05-30 RX ORDER — LEVOTHYROXINE SODIUM 0.03 MG/1
25 TABLET ORAL SEE ADMIN INSTRUCTIONS
Qty: 135 TABLET | Refills: 2 | Status: SHIPPED | OUTPATIENT
Start: 2024-05-30

## 2024-05-30 NOTE — PROGRESS NOTES
Assessment/Plan:    1. Essential hypertension  -     hydroCHLOROthiazide 12.5 mg tablet; Take 1 tablet (12.5 mg total) by mouth daily  -     metoprolol tartrate (LOPRESSOR) 50 mg tablet; Take 1 tablet (50 mg total) by mouth 2 (two) times a day  -     CBC and differential; Future  -     Comprehensive metabolic panel; Future  2. Stage 3a chronic kidney disease (HCC)  -     CBC and differential; Future  -     Comprehensive metabolic panel; Future  3. Other specified hypothyroidism  -     TSH, 3rd generation; Future  4. BMI 28.0-28.9,adult  5. Heart murmur  6. Macular degeneration of both eyes, unspecified type  7. Edema of both lower legs  -     CBC and differential; Future  -     Comprehensive metabolic panel; Future  8. Acquired hypothyroidism  -     levothyroxine 25 mcg tablet; Take 1 tablet (25 mcg total) by mouth see administration instructions Take 1 tablet alternating with 2 tablets daily.  -     CBC and differential; Future  9. SOB (shortness of breath) on exertion  10. Malignant neoplasm of nipple of left breast in female, unspecified estrogen receptor status (HCC)     Discussion/summary/plan:    Blood pressure elevated.  Patient states she takes HCTZ 12.5 mg 1 tablet every other day instead of once a day.  Advised to increase HCTZ 12.5 mg to once daily.  Advised for low-salt diet and continue metoprolol as well.  CKD stable.  Advised to maintain hydration.  Last TSH 3.17 advised to continue levothyroxine 25 mcg alternate with 50 mg daily.  Will follow TSH.  Since heart murmur aortic area and right sternal border.  Also gets exertional shortness of breath.  Discussed with the patient about seeing a cardiologist but she does not want.  Also advised to get echocardiogram but she does not want.  Has edema of legs most likely dependent.  Advised to wear elastic stockings.  He has a history of malignant neoplasm of the breast last mammogram April 2023.  Patient does not want to have anymore mammograms.  Advised to  watch diet and try to lose weight.  Advised to walk exercise as much as she can.  She has macular degeneration and seen and followed by ophthalmologist.  Very poor vision.  She does not cook or drive.  She has a good family support.    Subjective: Patient presents for follow-up.      Patient ID: Jayna Kiran is a 89 y.o. female.    HPI  89-year-old white female patient presents for follow-up of her medical problems.  She denies any chest pain.  She gets shortness of breath particular when she goes up steps or up hills.  Denies any cough, fever, chills.  Denies nausea vomiting diarrhea or pain in abdomen.    The following portions of the patient's history were reviewed and updated as appropriate:     Past Medical History:  She has a past medical history of Abdominal pain, LLQ (10/22/2021), Acute rhinitis (04/22/2022), Age related osteoporosis (01/22/2021), Arthritis, BMI 28.0-28.9,adult (01/22/2021), BMI 29.0-29.9,adult (07/22/2022), Cancer (HCC), Chest pain (07/22/2022), CKD (chronic kidney disease), Colonoscopy refused, Diverticulitis of colon, Dizziness, DJD (degenerative joint disease), Edema of both lower legs (01/22/2021), Encounter for support and coordination of transition of care (12/05/2022), Essential hypertension (01/22/2021), GE reflux, General weakness, Heart murmur (10/21/2022), Heart murmur, aortic, Heart murmur, aortic (10/22/2021), Hematoma of scalp (09/15/2021), History of breast cancer, Hypercalcemia, Hyperkalemia (01/22/2021), Hypertension, Hypothyroidism (07/23/2021), Laceration of right thumb (09/15/2021), Left shoulder pain (07/22/2022), Left shoulder pain (08/12/2022), Leukocytosis (01/21/2022), Macular degeneration, Medicare annual wellness visit, subsequent (01/20/2023), Medicare annual wellness visit, subsequent (01/30/2024), Muscular deconditioning (12/05/2022), Nausea (12/29/2021), Neck pain (07/22/2022), Night sweat (05/11/2023), Nodule of left lung (07/22/2022), Numbness and  tingling (07/23/2021), Osteoporosis, Other chest pain (07/22/2022), Other fatigue (05/11/2023), Over weight (10/21/2022), Overweight, Pneumonia due to infectious organism (12/05/2022), Primary osteoarthritis (01/22/2021), Sinobronchitis (11/01/2022), Sinus headache (12/29/2021), Skin lesion, SOB (shortness of breath) (12/29/2021), SOB (shortness of breath) on exertion (01/20/2023), Stasis edema of both lower extremities, URI (upper respiratory infection) (04/22/2022), and Urinary frequency (05/11/2023).,  _______________________________________________________________________  Past Surgical History:   has a past surgical history that includes Breast surgery; Squamous cell carcinoma excision; Cholecystectomy; Total hip arthroplasty (Bilateral); Total shoulder replacement (Right); Abdominal hysterectomy; Mastectomy (Left); Colonoscopy (07/18/2011); DXA procedure(historical) (01/29/2014); Mammo (historical) (10/05/2020); IR kyphoplasty/vertebroplasty; and LITHOLINK CKD PROGRAM (HISTORICAL).,  _______________________________________________________________________  Family History:  family history includes Lung cancer in her father.,  _______________________________________________________________________  Social History:   reports that she has never smoked. She has never used smokeless tobacco. She reports current alcohol use of about 1.0 standard drink of alcohol per week. She reports that she does not use drugs.,  _______________________________________________________________________  Allergies:  is allergic to actonel [risedronate sodium], amlodipine, and orudis [ketoprofen]..  _______________________________________________________________________  Current Outpatient Medications   Medication Sig Dispense Refill   • Ascorbic Acid (Vitamin C) 500 MG CAPS Take by mouth     • aspirin 81 mg chewable tablet Chew 81 mg daily     • calcium carbonate (OS-HUMBERTO) 600 MG tablet Take 600 mg by mouth 2 (two) times a day with meals  500mg BID     • hydroCHLOROthiazide 12.5 mg tablet Take 1 tablet (12.5 mg total) by mouth daily 90 tablet 2   • levothyroxine 25 mcg tablet Take 1 tablet (25 mcg total) by mouth see administration instructions Take 1 tablet alternating with 2 tablets daily. 135 tablet 2   • Lutein 10 MG TABS Take by mouth     • metoprolol tartrate (LOPRESSOR) 50 mg tablet Take 1 tablet (50 mg total) by mouth 2 (two) times a day 180 tablet 2   • Multiple Vitamins-Minerals (multivitamin with minerals) tablet Take 1 tablet by mouth daily     • omega-3-acid ethyl esters (LOVAZA) 1 g capsule Take 2 g by mouth 2 (two) times a day     • zinc gluconate 50 mg tablet Take 50 mg by mouth daily     • acetaminophen (TYLENOL) 500 mg tablet Take 500 mg by mouth 2 (two) times a day as needed (Patient not taking: Reported on 1/30/2024)       No current facility-administered medications for this visit.     _______________________________________________________________________  Review of Systems   Constitutional:  Negative for chills and fever.   HENT:  Negative for congestion, ear pain, hearing loss, nosebleeds, sinus pain, sore throat and trouble swallowing.    Eyes:  Negative for discharge, redness and visual disturbance.   Respiratory:  Positive for shortness of breath (On exertion). Negative for cough and chest tightness.    Cardiovascular:  Positive for leg swelling (Mainly lower legs around and above ankles.). Negative for chest pain and palpitations.   Gastrointestinal:  Negative for abdominal pain, blood in stool, constipation, diarrhea, nausea and vomiting.   Genitourinary:  Negative for dysuria, flank pain, frequency and hematuria.   Musculoskeletal:  Negative for arthralgias, myalgias and neck pain.   Skin:  Negative for color change and rash.   Neurological:  Negative for dizziness, speech difficulty, weakness and headaches.   Hematological:  Does not bruise/bleed easily.   Psychiatric/Behavioral:  Negative for agitation and behavioral  problems.            Objective:  Vitals:    05/30/24 0853   BP: 138/90   BP Location: Left arm   Patient Position: Sitting   Cuff Size: Standard   Pulse: 68   Resp: 18   Temp: 98.1 °F (36.7 °C)   TempSrc: Temporal   SpO2: 98%     There is no height or weight on file to calculate BMI.     Physical Exam  Vitals and nursing note reviewed.   Constitutional:       General: She is not in acute distress.     Appearance: Normal appearance.   HENT:      Head: Normocephalic and atraumatic.      Nose: Nose normal.   Eyes:      General: No scleral icterus.        Right eye: No discharge.         Left eye: No discharge.      Conjunctiva/sclera: Conjunctivae normal.   Cardiovascular:      Rate and Rhythm: Normal rate and regular rhythm.      Pulses: Normal pulses.      Heart sounds: Murmur heard.   Pulmonary:      Effort: Pulmonary effort is normal. No respiratory distress.      Breath sounds: Normal breath sounds. No wheezing, rhonchi or rales.   Abdominal:      General: Bowel sounds are normal.      Palpations: Abdomen is soft.      Tenderness: There is no abdominal tenderness.   Musculoskeletal:         General: Normal range of motion.      Cervical back: Normal range of motion and neck supple. No muscular tenderness.      Right lower leg: Edema (Has a trace to 1+ pedal edema around ankle and above ankle.) present.      Left lower leg: Edema (Has trace to 1+ pitting edema around and above ankle.) present.   Skin:     General: Skin is warm.      Findings: No rash.   Neurological:      General: No focal deficit present.      Mental Status: She is alert and oriented to person, place, and time.   Psychiatric:         Mood and Affect: Mood normal.         Behavior: Behavior normal.

## 2024-08-28 ENCOUNTER — TELEPHONE (OUTPATIENT)
Dept: INTERNAL MEDICINE CLINIC | Facility: CLINIC | Age: 89
End: 2024-08-28

## 2024-08-28 NOTE — TELEPHONE ENCOUNTER
Hi, Dr Mcnally will not be in the office 9/3/24 through 9/6/24, therefore, we must reschedule your appointment.      Please call our office to reschedule at [phone] or you may choose to reschedule your office visit through [employer].    Quang for any inconveniences.    Thank you,  [facility].

## 2024-08-31 ENCOUNTER — APPOINTMENT (OUTPATIENT)
Dept: LAB | Facility: HOSPITAL | Age: 89
End: 2024-08-31
Attending: INTERNAL MEDICINE
Payer: MEDICARE

## 2024-08-31 DIAGNOSIS — E03.9 ACQUIRED HYPOTHYROIDISM: ICD-10-CM

## 2024-08-31 DIAGNOSIS — R60.0 EDEMA OF BOTH LOWER LEGS: Chronic | ICD-10-CM

## 2024-08-31 DIAGNOSIS — N18.31 STAGE 3A CHRONIC KIDNEY DISEASE (HCC): Chronic | ICD-10-CM

## 2024-08-31 DIAGNOSIS — E03.8 OTHER SPECIFIED HYPOTHYROIDISM: ICD-10-CM

## 2024-08-31 DIAGNOSIS — I10 ESSENTIAL HYPERTENSION: Chronic | ICD-10-CM

## 2024-08-31 LAB
ALBUMIN SERPL BCG-MCNC: 4.3 G/DL (ref 3.5–5)
ALP SERPL-CCNC: 93 U/L (ref 34–104)
ALT SERPL W P-5'-P-CCNC: 11 U/L (ref 7–52)
ANION GAP SERPL CALCULATED.3IONS-SCNC: 8 MMOL/L (ref 4–13)
AST SERPL W P-5'-P-CCNC: 18 U/L (ref 13–39)
BASOPHILS # BLD AUTO: 0.11 THOUSANDS/ÂΜL (ref 0–0.1)
BASOPHILS NFR BLD AUTO: 1 % (ref 0–1)
BILIRUB SERPL-MCNC: 0.69 MG/DL (ref 0.2–1)
BUN SERPL-MCNC: 22 MG/DL (ref 5–25)
CALCIUM SERPL-MCNC: 10 MG/DL (ref 8.4–10.2)
CHLORIDE SERPL-SCNC: 105 MMOL/L (ref 96–108)
CO2 SERPL-SCNC: 27 MMOL/L (ref 21–32)
CREAT SERPL-MCNC: 1.12 MG/DL (ref 0.6–1.3)
EOSINOPHIL # BLD AUTO: 0.47 THOUSAND/ÂΜL (ref 0–0.61)
EOSINOPHIL NFR BLD AUTO: 5 % (ref 0–6)
ERYTHROCYTE [DISTWIDTH] IN BLOOD BY AUTOMATED COUNT: 14.4 % (ref 11.6–15.1)
GFR SERPL CREATININE-BSD FRML MDRD: 40 ML/MIN/1.73SQ M
GLUCOSE P FAST SERPL-MCNC: 90 MG/DL (ref 65–99)
HCT VFR BLD AUTO: 46.1 % (ref 34.8–46.1)
HGB BLD-MCNC: 15.3 G/DL (ref 11.5–15.4)
IMM GRANULOCYTES # BLD AUTO: 0.04 THOUSAND/UL (ref 0–0.2)
IMM GRANULOCYTES NFR BLD AUTO: 0 % (ref 0–2)
LYMPHOCYTES # BLD AUTO: 3.03 THOUSANDS/ÂΜL (ref 0.6–4.47)
LYMPHOCYTES NFR BLD AUTO: 29 % (ref 14–44)
MCH RBC QN AUTO: 30.8 PG (ref 26.8–34.3)
MCHC RBC AUTO-ENTMCNC: 33.2 G/DL (ref 31.4–37.4)
MCV RBC AUTO: 93 FL (ref 82–98)
MONOCYTES # BLD AUTO: 0.84 THOUSAND/ÂΜL (ref 0.17–1.22)
MONOCYTES NFR BLD AUTO: 8 % (ref 4–12)
NEUTROPHILS # BLD AUTO: 5.83 THOUSANDS/ÂΜL (ref 1.85–7.62)
NEUTS SEG NFR BLD AUTO: 57 % (ref 43–75)
NRBC BLD AUTO-RTO: 0 /100 WBCS
PLATELET # BLD AUTO: 246 THOUSANDS/UL (ref 149–390)
PMV BLD AUTO: 9.6 FL (ref 8.9–12.7)
POTASSIUM SERPL-SCNC: 4.3 MMOL/L (ref 3.5–5.3)
PROT SERPL-MCNC: 7.1 G/DL (ref 6.4–8.4)
RBC # BLD AUTO: 4.97 MILLION/UL (ref 3.81–5.12)
SODIUM SERPL-SCNC: 140 MMOL/L (ref 135–147)
TSH SERPL DL<=0.05 MIU/L-ACNC: 4.5 UIU/ML (ref 0.45–4.5)
WBC # BLD AUTO: 10.32 THOUSAND/UL (ref 4.31–10.16)

## 2024-08-31 PROCEDURE — 80053 COMPREHEN METABOLIC PANEL: CPT

## 2024-08-31 PROCEDURE — 85025 COMPLETE CBC W/AUTO DIFF WBC: CPT

## 2024-08-31 PROCEDURE — 36415 COLL VENOUS BLD VENIPUNCTURE: CPT

## 2024-08-31 PROCEDURE — 84443 ASSAY THYROID STIM HORMONE: CPT

## 2024-09-04 ENCOUNTER — RA CDI HCC (OUTPATIENT)
Dept: OTHER | Facility: HOSPITAL | Age: 89
End: 2024-09-04

## 2024-09-19 ENCOUNTER — OFFICE VISIT (OUTPATIENT)
Dept: INTERNAL MEDICINE CLINIC | Facility: CLINIC | Age: 89
End: 2024-09-19
Payer: MEDICARE

## 2024-09-19 VITALS
HEART RATE: 64 BPM | TEMPERATURE: 97.5 F | OXYGEN SATURATION: 97 % | RESPIRATION RATE: 18 BRPM | SYSTOLIC BLOOD PRESSURE: 144 MMHG | DIASTOLIC BLOOD PRESSURE: 92 MMHG

## 2024-09-19 DIAGNOSIS — E03.9 ACQUIRED HYPOTHYROIDISM: ICD-10-CM

## 2024-09-19 DIAGNOSIS — D72.828 OTHER ELEVATED WHITE BLOOD CELL (WBC) COUNT: ICD-10-CM

## 2024-09-19 DIAGNOSIS — I35.8 HEART MURMUR, AORTIC: ICD-10-CM

## 2024-09-19 DIAGNOSIS — H35.30 MACULAR DEGENERATION OF BOTH EYES, UNSPECIFIED TYPE: Chronic | ICD-10-CM

## 2024-09-19 DIAGNOSIS — M15.9 PRIMARY OSTEOARTHRITIS INVOLVING MULTIPLE JOINTS: Chronic | ICD-10-CM

## 2024-09-19 DIAGNOSIS — N18.32 STAGE 3B CHRONIC KIDNEY DISEASE (HCC): Chronic | ICD-10-CM

## 2024-09-19 DIAGNOSIS — K59.09 OTHER CONSTIPATION: ICD-10-CM

## 2024-09-19 DIAGNOSIS — Z23 NEED FOR PROPHYLACTIC VACCINATION AND INOCULATION AGAINST INFLUENZA: ICD-10-CM

## 2024-09-19 DIAGNOSIS — R60.0 EDEMA OF BOTH LOWER LEGS: Chronic | ICD-10-CM

## 2024-09-19 DIAGNOSIS — I10 ESSENTIAL HYPERTENSION: Primary | Chronic | ICD-10-CM

## 2024-09-19 PROCEDURE — 90662 IIV NO PRSV INCREASED AG IM: CPT | Performed by: INTERNAL MEDICINE

## 2024-09-19 PROCEDURE — 99214 OFFICE O/P EST MOD 30 MIN: CPT | Performed by: INTERNAL MEDICINE

## 2024-09-19 PROCEDURE — G0008 ADMIN INFLUENZA VIRUS VAC: HCPCS | Performed by: INTERNAL MEDICINE

## 2024-09-19 RX ORDER — IBUPROFEN 200 MG
400 TABLET ORAL AS NEEDED
COMMUNITY

## 2024-09-19 RX ORDER — LEVOTHYROXINE SODIUM 25 UG/1
25 TABLET ORAL SEE ADMIN INSTRUCTIONS
Qty: 135 TABLET | Refills: 2 | Status: SHIPPED | OUTPATIENT
Start: 2024-09-19

## 2024-09-19 RX ORDER — METOPROLOL TARTRATE 50 MG
50 TABLET ORAL 2 TIMES DAILY
Qty: 180 TABLET | Refills: 2 | Status: SHIPPED | OUTPATIENT
Start: 2024-09-19

## 2024-09-19 RX ORDER — HYDROCHLOROTHIAZIDE 12.5 MG/1
12.5 TABLET ORAL DAILY
Qty: 90 TABLET | Refills: 2 | Status: SHIPPED | OUTPATIENT
Start: 2024-09-19 | End: 2025-03-18

## 2024-09-19 NOTE — PATIENT INSTRUCTIONS
Patient was advised to continue present medications. discussed with the patient medications and laboratory data in detail.  Follow-up with me in 3 months or as advised.  If any blood test was ordered please do 1 week prior to next appointment unless advise to get earlier.  If you have any questions please call the office [phone]

## 2024-09-19 NOTE — PROGRESS NOTES
Assessment/Plan:    1. Essential hypertension  -     hydroCHLOROthiazide 12.5 mg tablet; Take 1 tablet (12.5 mg total) by mouth daily  -     metoprolol tartrate (LOPRESSOR) 50 mg tablet; Take 1 tablet (50 mg total) by mouth 2 (two) times a day  2. Primary osteoarthritis involving multiple joints  3. Stage 3b chronic kidney disease (HCC)  4. Other elevated white blood cell (WBC) count  5. Macular degeneration of both eyes, unspecified type  6. Edema of both lower legs  7. Heart murmur, aortic  8. Other constipation  9. Need for prophylactic vaccination and inoculation against influenza  -     influenza vaccine, high-dose, PF 0.7 mL (FLUZONE HIGH-DOSE)  10. Acquired hypothyroidism  -     levothyroxine 25 mcg tablet; Take 1 tablet (25 mcg total) by mouth see administration instructions Take 1 tablet alternating with 2 tablets daily.     Discussion/summary/plan:    Blood pressure elevated.  Discussed with the patient about adjusting or adding medication for blood pressure at present she would like to continue same dose of current medications and advised to monitor blood pressure at home if persistently elevated she will call.  Continue low salt diet.  She has arthritis symptoms gets pain in the shoulders, hips for which she takes 2 Advil daily.  She tried acetaminophen which is not effective.  Does not want to see any specialist for arthritis.  Discussed with the patient due to CKD to avoid NSAIDs but patient states that she has been taking this medicine and it is working for her and she would like to continue Advil.  CKD stable advised to maintain hydration.  On and off she gets slightly elevated WBC with normal hemoglobin and platelet counts.  Will observe and follow.  She has macular degeneration has been seen and followed by ophthalmologist.  She is not driving.  Edema of legs all better.  She is on HCTZ 12.5 mg daily.  Does not want to increase the diuretic dose as she already having sometimes urinary incontinence.   Advised for regular toilet training.  Does not want to see any specialist for urinary problem.  Patient coming of constipation has tried Dulcolax which is not effective.  Denies any pain in abdomen.  Advised to try MiraLAX powder 17 g/day advised to maintain hydration and fibers.  TSH therapeutic so advised to continue same dose of levothyroxine.  She has a murmur in the aortic area most likely stenosis presently asymptomatic.  Was advised in the past to see cardiology but she does not want to see any cardiologist.    Subjective: Patient presents for follow-up.      Patient ID: Jayna Kiran is a 89 y.o. female.    HPI  89-year-old white female patient presents for follow-up for medical problems.  She is here with her daughter.  She denies chest pain, shortness of breath, pain abdomen.  Denies cough, fever, chills.  Denies nausea vomiting diarrhea pain abdomen.  Patient complain of constipation.  She tried Dulcolax but not effective.    The following portions of the patient's history were reviewed and updated as appropriate:     Past Medical History:  She has a past medical history of Abdominal pain, LLQ (10/22/2021), Acute rhinitis (04/22/2022), Age related osteoporosis (01/22/2021), Arthritis, BMI 28.0-28.9,adult (01/22/2021), BMI 29.0-29.9,adult (07/22/2022), Cancer (HCC), Chest pain (07/22/2022), CKD (chronic kidney disease), Colonoscopy refused, Diverticulitis of colon, Dizziness, DJD (degenerative joint disease), Edema of both lower legs (01/22/2021), Encounter for support and coordination of transition of care (12/05/2022), Essential hypertension (01/22/2021), GE reflux, General weakness, Heart murmur (10/21/2022), Heart murmur, aortic, Heart murmur, aortic (10/22/2021), Hematoma of scalp (09/15/2021), History of breast cancer, Hypercalcemia, Hyperkalemia (01/22/2021), Hypertension, Hypothyroidism (07/23/2021), Laceration of right thumb (09/15/2021), Left shoulder pain (07/22/2022), Left shoulder pain  (08/12/2022), Leukocytosis (01/21/2022), Macular degeneration, Medicare annual wellness visit, subsequent (01/20/2023), Medicare annual wellness visit, subsequent (01/30/2024), Muscular deconditioning (12/05/2022), Nausea (12/29/2021), Neck pain (07/22/2022), Night sweat (05/11/2023), Nodule of left lung (07/22/2022), Numbness and tingling (07/23/2021), Osteoporosis, Other chest pain (07/22/2022), Other constipation (09/19/2024), Other fatigue (05/11/2023), Over weight (10/21/2022), Overweight, Pneumonia due to infectious organism (12/05/2022), Primary osteoarthritis (01/22/2021), Sinobronchitis (11/01/2022), Sinus headache (12/29/2021), Skin lesion, SOB (shortness of breath) (12/29/2021), SOB (shortness of breath) on exertion (01/20/2023), Stasis edema of both lower extremities, URI (upper respiratory infection) (04/22/2022), and Urinary frequency (05/11/2023).,  _______________________________________________________________________  Past Surgical History:   has a past surgical history that includes Breast surgery; Squamous cell carcinoma excision; Cholecystectomy; Total hip arthroplasty (Bilateral); Total shoulder replacement (Right); Abdominal hysterectomy; Mastectomy (Left); Colonoscopy (07/18/2011); DXA procedure(historical) (01/29/2014); Mammo (historical) (10/05/2020); IR kyphoplasty/vertebroplasty; and LITHOLINK CKD PROGRAM (HISTORICAL).,  _______________________________________________________________________  Family History:  family history includes Lung cancer in her father.,  _______________________________________________________________________  Social History:   reports that she has never smoked. She has never used smokeless tobacco. She reports current alcohol use of about 1.0 standard drink of alcohol per week. She reports that she does not use drugs.,  _______________________________________________________________________  Allergies:  is allergic to actonel [risedronate sodium], amlodipine, and  orudis [ketoprofen]..  _______________________________________________________________________  Current Outpatient Medications   Medication Sig Dispense Refill    Ascorbic Acid (Vitamin C) 500 MG CAPS Take by mouth      aspirin 81 mg chewable tablet Chew 81 mg daily      calcium carbonate (OS-HUMBERTO) 600 MG tablet Take 600 mg by mouth 2 (two) times a day with meals 500mg BID      hydroCHLOROthiazide 12.5 mg tablet Take 1 tablet (12.5 mg total) by mouth daily 90 tablet 2    ibuprofen (MOTRIN) 200 mg tablet Take 400 mg by mouth if needed for mild pain      levothyroxine 25 mcg tablet Take 1 tablet (25 mcg total) by mouth see administration instructions Take 1 tablet alternating with 2 tablets daily. 135 tablet 2    metoprolol tartrate (LOPRESSOR) 50 mg tablet Take 1 tablet (50 mg total) by mouth 2 (two) times a day 180 tablet 2    Multiple Vitamins-Minerals (multivitamin with minerals) tablet Take 1 tablet by mouth daily      Multiple Vitamins-Minerals (PRESERVISION AREDS PO) Take 2 tablets by mouth daily      omega-3-acid ethyl esters (LOVAZA) 1 g capsule Take 2 g by mouth 2 (two) times a day       No current facility-administered medications for this visit.     _______________________________________________________________________  Review of Systems   Constitutional:  Negative for chills and fever.   HENT:  Negative for congestion, sore throat and voice change.    Eyes:  Negative for discharge and redness.   Respiratory:  Negative for cough and shortness of breath.    Cardiovascular:  Negative for chest pain and palpitations.   Gastrointestinal:  Positive for constipation. Negative for abdominal pain, diarrhea, nausea and vomiting.   Genitourinary:  Positive for urgency.   Musculoskeletal:  Positive for arthralgias.   Skin:  Negative for rash.   Neurological:  Negative for dizziness, weakness and headaches.   Psychiatric/Behavioral:  Negative for agitation and behavioral problems.          Objective:  Vitals:     09/19/24 0820   BP: 144/92   BP Location: Left arm   Patient Position: Sitting   Cuff Size: Large   Pulse: 64   Resp: 18   Temp: 97.5 °F (36.4 °C)   TempSrc: Temporal   SpO2: 97%     There is no height or weight on file to calculate BMI.     Physical Exam  Vitals and nursing note reviewed.   Constitutional:       General: She is not in acute distress.     Appearance: Normal appearance.   HENT:      Head: Normocephalic and atraumatic.      Right Ear: Ear canal normal.      Left Ear: Ear canal normal.      Nose: Nose normal.   Eyes:      General: No scleral icterus.        Right eye: No discharge.         Left eye: No discharge.      Extraocular Movements: Extraocular movements intact.      Conjunctiva/sclera: Conjunctivae normal.   Cardiovascular:      Rate and Rhythm: Normal rate and regular rhythm.      Pulses: Normal pulses.      Heart sounds: Murmur heard.   Pulmonary:      Effort: Pulmonary effort is normal. No respiratory distress.      Breath sounds: Normal breath sounds. No wheezing.   Abdominal:      General: Bowel sounds are normal.      Palpations: Abdomen is soft.      Tenderness: There is no abdominal tenderness.   Musculoskeletal:         General: Normal range of motion.      Cervical back: Normal range of motion and neck supple. No muscular tenderness.      Right lower leg: No edema.      Left lower leg: No edema.      Comments: She has a DJD changes of the joints of the fingers.  Has some decrease range of motion of shoulders.   Skin:     General: Skin is warm.      Findings: No rash.   Neurological:      General: No focal deficit present.      Mental Status: She is alert and oriented to person, place, and time.   Psychiatric:         Mood and Affect: Mood normal.         Behavior: Behavior normal.

## 2024-12-12 ENCOUNTER — TELEPHONE (OUTPATIENT)
Dept: INTERNAL MEDICINE CLINIC | Facility: CLINIC | Age: 89
End: 2024-12-12

## 2024-12-12 NOTE — TELEPHONE ENCOUNTER
Patient called requesting refill for Metoprolol. Patient made aware medication was refilled on 9/19/24 for 180 with 2 refills to [facility]  pharmacy. Patient instructed to contact the pharmacy to obtain refills of medication. Patient verbalized understanding.

## 2025-01-02 ENCOUNTER — OFFICE VISIT (OUTPATIENT)
Dept: INTERNAL MEDICINE CLINIC | Facility: CLINIC | Age: OVER 89
End: 2025-01-02
Payer: MEDICARE

## 2025-01-02 VITALS
OXYGEN SATURATION: 98 % | HEART RATE: 80 BPM | DIASTOLIC BLOOD PRESSURE: 78 MMHG | TEMPERATURE: 97.6 F | SYSTOLIC BLOOD PRESSURE: 154 MMHG | RESPIRATION RATE: 18 BRPM

## 2025-01-02 DIAGNOSIS — N18.32 STAGE 3B CHRONIC KIDNEY DISEASE (HCC): Chronic | ICD-10-CM

## 2025-01-02 DIAGNOSIS — R01.1 HEART MURMUR: ICD-10-CM

## 2025-01-02 DIAGNOSIS — H35.30 MACULAR DEGENERATION OF BOTH EYES, UNSPECIFIED TYPE: Chronic | ICD-10-CM

## 2025-01-02 DIAGNOSIS — R60.0 EDEMA OF BOTH LOWER LEGS: Chronic | ICD-10-CM

## 2025-01-02 DIAGNOSIS — D72.828 OTHER ELEVATED WHITE BLOOD CELL (WBC) COUNT: ICD-10-CM

## 2025-01-02 DIAGNOSIS — E03.8 OTHER SPECIFIED HYPOTHYROIDISM: ICD-10-CM

## 2025-01-02 DIAGNOSIS — R06.02 SOB (SHORTNESS OF BREATH) ON EXERTION: ICD-10-CM

## 2025-01-02 DIAGNOSIS — I10 ESSENTIAL HYPERTENSION: Primary | Chronic | ICD-10-CM

## 2025-01-02 PROCEDURE — 99214 OFFICE O/P EST MOD 30 MIN: CPT | Performed by: INTERNAL MEDICINE

## 2025-01-02 PROCEDURE — G2211 COMPLEX E/M VISIT ADD ON: HCPCS | Performed by: INTERNAL MEDICINE

## 2025-01-02 RX ORDER — HYDROCHLOROTHIAZIDE 25 MG/1
25 TABLET ORAL DAILY
Qty: 90 TABLET | Refills: 1 | Status: SHIPPED | OUTPATIENT
Start: 2025-01-02

## 2025-01-02 RX ORDER — LEVOTHYROXINE SODIUM 25 UG/1
25 TABLET ORAL SEE ADMIN INSTRUCTIONS
Qty: 135 TABLET | Refills: 2 | Status: SHIPPED | OUTPATIENT
Start: 2025-01-02

## 2025-01-02 NOTE — ASSESSMENT & PLAN NOTE
Blood pressure elevated.  Discussed with the patient about increasing the dose of HCTZ patient agreed so will increase HCTZ from 12.5 to 25 mg once a day.  Continue present other medication.  Continue low-salt diet.  Orders:  •  hydroCHLOROthiazide 25 mg tablet; Take 1 tablet (25 mg total) by mouth daily  •  CBC and differential; Future  •  Basic metabolic panel; Future

## 2025-01-02 NOTE — ASSESSMENT & PLAN NOTE
Edema of legs mainly around ankles.  Possible dependent.  Wears elastic stockings.  As mentioned above will try to increase the dose of HCTZ hopefully that will help for edema of legs as well as blood pressure.  Advised to keep legs elevated.

## 2025-01-02 NOTE — ASSESSMENT & PLAN NOTE
Last time he WBC was elevated.  Hemoglobin platelet counts normal.  No signs symptoms of infection.  Will follow CBC.  Orders:  •  CBC and differential; Future

## 2025-01-02 NOTE — PATIENT INSTRUCTIONS
Patient was advised to continue present medications. discussed with the patient medications and laboratory data in detail.  Follow-up with me in 4 months or as advised.  If any blood test was ordered please do 1 week prior to next appointment unless advise to get earlier.  If you have any questions please call the office [phone]

## 2025-01-02 NOTE — ASSESSMENT & PLAN NOTE
Last TSH 4.4 so advised to continue same dose of levothyroxine.  Will follow TSH.  Orders:  •  levothyroxine 25 mcg tablet; Take 1 tablet (25 mcg total) by mouth see administration instructions Take 1 tablet alternating with 2 tablets daily.  •  TSH, 3rd generation; Future

## 2025-01-02 NOTE — ASSESSMENT & PLAN NOTE
Lab Results   Component Value Date    EGFR 40 08/31/2024    EGFR 45 05/04/2024    EGFR 43 01/06/2024    CREATININE 1.12 08/31/2024    CREATININE 1.03 05/04/2024    CREATININE 1.07 01/06/2024   CKD stable advised to maintain hydration.  Will follow electrolyte and renal function.    Orders:  •  CBC and differential; Future  •  Basic metabolic panel; Future

## 2025-01-02 NOTE — ASSESSMENT & PLAN NOTE
She gets shortness of breath on exertion.  Denies any cough or fever or chest pain.  Could be deconditioning versus cannot rule out underlying heart disease.  She has a heart murmur.  Discussed with the patient and patient's family who is present in the office today regarding seeing cardiology but at present she does not want to see cardiologist as she does not want  to go for any intervention.

## 2025-01-02 NOTE — PROGRESS NOTES
Name: Jayna Kiran      : 1925      MRN: [id number]  Encounter Provider: Clara Mcnally MD  Encounter Date: 2025   Encounter department: [facility] INTERNAL MEDICINE  :  Assessment & Plan  Essential hypertension  Blood pressure elevated.  Discussed with the patient about increasing the dose of HCTZ patient agreed so will increase HCTZ from 12.5 to 25 mg once a day.  Continue present other medication.  Continue low-salt diet.  Orders:  •  hydroCHLOROthiazide 25 mg tablet; Take 1 tablet (25 mg total) by mouth daily  •  CBC and differential; Future  •  Basic metabolic panel; Future    Other specified hypothyroidism  Last TSH 4.4 so advised to continue same dose of levothyroxine.  Will follow TSH.  Orders:  •  levothyroxine 25 mcg tablet; Take 1 tablet (25 mcg total) by mouth see administration instructions Take 1 tablet alternating with 2 tablets daily.  •  TSH, 3rd generation; Future    Stage 3b chronic kidney disease (HCC)  Lab Results   Component Value Date    EGFR 40 2024    EGFR 45 2024    EGFR 43 2024    CREATININE 1.12 2024    CREATININE 1.03 2024    CREATININE 1.07 2024   CKD stable advised to maintain hydration.  Will follow electrolyte and renal function.    Orders:  •  CBC and differential; Future  •  Basic metabolic panel; Future    SOB (shortness of breath) on exertion  She gets shortness of breath on exertion.  Denies any cough or fever or chest pain.  Could be deconditioning versus cannot rule out underlying heart disease.  She has a heart murmur.  Discussed with the patient and patient's family who is present in the office today regarding seeing cardiology but at present she does not want to see cardiologist as she does not want  to go for any intervention.       Heart murmur  As above.       Edema of both lower legs  Edema of legs mainly around ankles.  Possible dependent.  Wears elastic stockings.  As mentioned above will  try to increase the dose of HCTZ hopefully that will help for edema of legs as well as blood pressure.  Advised to keep legs elevated.       Other elevated white blood cell (WBC) count  Last time he WBC was elevated.  Hemoglobin platelet counts normal.  No signs symptoms of infection.  Will follow CBC.  Orders:  •  CBC and differential; Future    Macular degeneration of both eyes, unspecified type  She does not drive.  Has a poor vision.  Follows ophthalmologist.              History of Present Illness     HPI  89-year-old white female patient present for follow-up of her medical problems.    Review of Systems   Constitutional:  Negative for chills and fever.   HENT:  Negative for congestion, postnasal drip and sore throat.    Eyes:  Positive for visual disturbance (Has a poor vision from macular degeneration.). Negative for discharge and redness.   Respiratory:  Positive for shortness of breath (She gets shortness of breath on exertion.). Negative for cough.    Cardiovascular:  Negative for chest pain and palpitations.   Gastrointestinal:  Negative for abdominal distention, constipation, diarrhea, nausea and vomiting.   Musculoskeletal:  Positive for arthralgias. Negative for myalgias.   Neurological:  Negative for dizziness, weakness and headaches.   Psychiatric/Behavioral:  Negative for agitation and behavioral problems.            Objective   /78 (BP Location: Right arm, Patient Position: Sitting, Cuff Size: Large)   Pulse 80   Temp 97.6 °F (36.4 °C) (Temporal)   Resp 18   SpO2 98%      Physical Exam  Vitals and nursing note reviewed.   Constitutional:       General: She is not in acute distress.     Appearance: She is well-developed.   HENT:      Head: Normocephalic and atraumatic.      Right Ear: External ear normal.      Left Ear: External ear normal.      Nose: Nose normal.      Mouth/Throat:      Pharynx: Oropharynx is clear.   Eyes:      General: No scleral icterus.        Right eye: No  discharge.         Left eye: No discharge.      Extraocular Movements: Extraocular movements intact.      Conjunctiva/sclera: Conjunctivae normal.   Cardiovascular:      Rate and Rhythm: Normal rate and regular rhythm.      Pulses: Normal pulses.      Heart sounds: Murmur heard.   Pulmonary:      Effort: Pulmonary effort is normal. No respiratory distress.      Breath sounds: Normal breath sounds. No wheezing, rhonchi or rales.   Abdominal:      General: Bowel sounds are normal.      Palpations: Abdomen is soft.      Tenderness: There is no abdominal tenderness.   Musculoskeletal:         General: No swelling. Normal range of motion.      Cervical back: Normal range of motion and neck supple.      Right lower leg: Edema (Has a trace to 1+ ankle  edema.) present.      Left lower leg: Edema (Has a trace to 1+ ankle edema) present.   Skin:     General: Skin is warm and dry.      Capillary Refill: Capillary refill takes less than 2 seconds.   Neurological:      General: No focal deficit present.      Mental Status: She is alert and oriented to person, place, and time.   Psychiatric:         Mood and Affect: Mood normal.         Behavior: Behavior normal.

## 2025-05-02 ENCOUNTER — APPOINTMENT (OUTPATIENT)
Dept: LAB | Facility: HOSPITAL | Age: OVER 89
End: 2025-05-02
Attending: INTERNAL MEDICINE
Payer: MEDICARE

## 2025-05-02 DIAGNOSIS — E03.8 OTHER SPECIFIED HYPOTHYROIDISM: ICD-10-CM

## 2025-05-02 DIAGNOSIS — N18.32 STAGE 3B CHRONIC KIDNEY DISEASE (HCC): Chronic | ICD-10-CM

## 2025-05-02 DIAGNOSIS — D72.828 OTHER ELEVATED WHITE BLOOD CELL (WBC) COUNT: ICD-10-CM

## 2025-05-02 DIAGNOSIS — I10 ESSENTIAL HYPERTENSION: Chronic | ICD-10-CM

## 2025-05-02 LAB
ANION GAP SERPL CALCULATED.3IONS-SCNC: 11 MMOL/L (ref 4–13)
BASOPHILS # BLD AUTO: 0.1 THOUSANDS/ÂΜL (ref 0–0.1)
BASOPHILS NFR BLD AUTO: 1 % (ref 0–1)
BUN SERPL-MCNC: 26 MG/DL (ref 5–25)
CALCIUM SERPL-MCNC: 9.8 MG/DL (ref 8.4–10.2)
CHLORIDE SERPL-SCNC: 102 MMOL/L (ref 96–108)
CO2 SERPL-SCNC: 25 MMOL/L (ref 21–32)
CREAT SERPL-MCNC: 1.21 MG/DL (ref 0.6–1.3)
EOSINOPHIL # BLD AUTO: 0.26 THOUSAND/ÂΜL (ref 0–0.61)
EOSINOPHIL NFR BLD AUTO: 3 % (ref 0–6)
ERYTHROCYTE [DISTWIDTH] IN BLOOD BY AUTOMATED COUNT: 14.5 % (ref 11.6–15.1)
GFR SERPL CREATININE-BSD FRML MDRD: 37 ML/MIN/1.73SQ M
GLUCOSE P FAST SERPL-MCNC: 106 MG/DL (ref 65–99)
HCT VFR BLD AUTO: 43.6 % (ref 34.8–46.1)
HGB BLD-MCNC: 14.6 G/DL (ref 11.5–15.4)
IMM GRANULOCYTES # BLD AUTO: 0.04 THOUSAND/UL (ref 0–0.2)
IMM GRANULOCYTES NFR BLD AUTO: 0 % (ref 0–2)
LYMPHOCYTES # BLD AUTO: 2.33 THOUSANDS/ÂΜL (ref 0.6–4.47)
LYMPHOCYTES NFR BLD AUTO: 25 % (ref 14–44)
MCH RBC QN AUTO: 30.5 PG (ref 26.8–34.3)
MCHC RBC AUTO-ENTMCNC: 33.5 G/DL (ref 31.4–37.4)
MCV RBC AUTO: 91 FL (ref 82–98)
MONOCYTES # BLD AUTO: 0.8 THOUSAND/ÂΜL (ref 0.17–1.22)
MONOCYTES NFR BLD AUTO: 9 % (ref 4–12)
NEUTROPHILS # BLD AUTO: 5.87 THOUSANDS/ÂΜL (ref 1.85–7.62)
NEUTS SEG NFR BLD AUTO: 62 % (ref 43–75)
NRBC BLD AUTO-RTO: 0 /100 WBCS
PLATELET # BLD AUTO: 240 THOUSANDS/UL (ref 149–390)
PMV BLD AUTO: 9.7 FL (ref 8.9–12.7)
POTASSIUM SERPL-SCNC: 4.1 MMOL/L (ref 3.5–5.3)
RBC # BLD AUTO: 4.79 MILLION/UL (ref 3.81–5.12)
SODIUM SERPL-SCNC: 138 MMOL/L (ref 135–147)
TSH SERPL DL<=0.05 MIU/L-ACNC: 1.64 UIU/ML (ref 0.45–4.5)
WBC # BLD AUTO: 9.4 THOUSAND/UL (ref 4.31–10.16)

## 2025-05-02 PROCEDURE — 85025 COMPLETE CBC W/AUTO DIFF WBC: CPT

## 2025-05-02 PROCEDURE — 84443 ASSAY THYROID STIM HORMONE: CPT

## 2025-05-02 PROCEDURE — 36415 COLL VENOUS BLD VENIPUNCTURE: CPT

## 2025-05-02 PROCEDURE — 80048 BASIC METABOLIC PNL TOTAL CA: CPT

## 2025-05-08 ENCOUNTER — RESULTS FOLLOW-UP (OUTPATIENT)
Dept: INTERNAL MEDICINE CLINIC | Facility: CLINIC | Age: OVER 89
End: 2025-05-08

## 2025-05-08 ENCOUNTER — HOSPITAL ENCOUNTER (OUTPATIENT)
Dept: CT IMAGING | Facility: HOSPITAL | Age: OVER 89
End: 2025-05-08
Attending: INTERNAL MEDICINE
Payer: MEDICARE

## 2025-05-08 ENCOUNTER — OFFICE VISIT (OUTPATIENT)
Dept: INTERNAL MEDICINE CLINIC | Facility: CLINIC | Age: OVER 89
End: 2025-05-08
Payer: MEDICARE

## 2025-05-08 ENCOUNTER — NURSE TRIAGE (OUTPATIENT)
Dept: INTERNAL MEDICINE CLINIC | Facility: CLINIC | Age: OVER 89
End: 2025-05-08

## 2025-05-08 VITALS
TEMPERATURE: 98 F | RESPIRATION RATE: 18 BRPM | OXYGEN SATURATION: 98 % | DIASTOLIC BLOOD PRESSURE: 90 MMHG | HEART RATE: 59 BPM | SYSTOLIC BLOOD PRESSURE: 150 MMHG

## 2025-05-08 DIAGNOSIS — H92.01 RIGHT EAR PAIN: ICD-10-CM

## 2025-05-08 DIAGNOSIS — R10.9 FLANK PAIN: ICD-10-CM

## 2025-05-08 DIAGNOSIS — R26.89 BALANCE PROBLEM: ICD-10-CM

## 2025-05-08 DIAGNOSIS — K59.09 OTHER CONSTIPATION: ICD-10-CM

## 2025-05-08 DIAGNOSIS — R42 DIZZINESS: ICD-10-CM

## 2025-05-08 DIAGNOSIS — E03.8 OTHER SPECIFIED HYPOTHYROIDISM: ICD-10-CM

## 2025-05-08 DIAGNOSIS — Z00.00 MEDICARE ANNUAL WELLNESS VISIT, SUBSEQUENT: Primary | ICD-10-CM

## 2025-05-08 DIAGNOSIS — C50.012 MALIGNANT NEOPLASM OF NIPPLE OF LEFT BREAST IN FEMALE, UNSPECIFIED ESTROGEN RECEPTOR STATUS (HCC): ICD-10-CM

## 2025-05-08 DIAGNOSIS — I10 ESSENTIAL HYPERTENSION: Chronic | ICD-10-CM

## 2025-05-08 DIAGNOSIS — N18.32 STAGE 3B CHRONIC KIDNEY DISEASE (HCC): Chronic | ICD-10-CM

## 2025-05-08 DIAGNOSIS — H35.30 MACULAR DEGENERATION OF BOTH EYES, UNSPECIFIED TYPE: Chronic | ICD-10-CM

## 2025-05-08 PROCEDURE — G2211 COMPLEX E/M VISIT ADD ON: HCPCS | Performed by: INTERNAL MEDICINE

## 2025-05-08 PROCEDURE — 93000 ELECTROCARDIOGRAM COMPLETE: CPT | Performed by: INTERNAL MEDICINE

## 2025-05-08 PROCEDURE — 70450 CT HEAD/BRAIN W/O DYE: CPT

## 2025-05-08 PROCEDURE — 99214 OFFICE O/P EST MOD 30 MIN: CPT | Performed by: INTERNAL MEDICINE

## 2025-05-08 PROCEDURE — G0439 PPPS, SUBSEQ VISIT: HCPCS | Performed by: INTERNAL MEDICINE

## 2025-05-08 PROCEDURE — 74176 CT ABD & PELVIS W/O CONTRAST: CPT

## 2025-05-08 RX ORDER — LEVOTHYROXINE SODIUM 25 UG/1
25 TABLET ORAL SEE ADMIN INSTRUCTIONS
Qty: 135 TABLET | Refills: 2 | Status: SHIPPED | OUTPATIENT
Start: 2025-05-08

## 2025-05-08 RX ORDER — METOPROLOL TARTRATE 50 MG
50 TABLET ORAL 2 TIMES DAILY
Qty: 180 TABLET | Refills: 2 | Status: SHIPPED | OUTPATIENT
Start: 2025-05-08

## 2025-05-08 RX ORDER — DOCUSATE SODIUM 100 MG/1
100 CAPSULE, LIQUID FILLED ORAL 2 TIMES DAILY
Qty: 60 CAPSULE | Refills: 5 | Status: SHIPPED | OUTPATIENT
Start: 2025-05-08

## 2025-05-08 RX ORDER — HYDROCHLOROTHIAZIDE 25 MG/1
25 TABLET ORAL DAILY
Qty: 90 TABLET | Refills: 1 | Status: SHIPPED | OUTPATIENT
Start: 2025-05-08

## 2025-05-08 NOTE — PATIENT INSTRUCTIONS
Patient was advised to continue present medications. discussed with the patient medications and laboratory data in detail.  Follow-up with me in 4 months or as advised.  If any blood test was ordered please do 1 week prior to next appointment unless advise to get earlier.  If you have any questions please call the office [phone]

## 2025-05-08 NOTE — ASSESSMENT & PLAN NOTE
Patient, no dizziness like off balance for last 3 days.  Denies any headache or any focal weakness is present.  Denies any syncope or presyncope.  Will order CT of the brain to rule out any intracranial insult versus positional.  Advised to avoid sudden head movement for sudden turns.  Advised to use cane for safety to prevent fall.  EKG was done in the office revealed a normal sinus rhythm heart rate 55/min, left ventricular hypertrophy with QRS widening unchanged from prior EKG except has a sinus bradycardia.  Most likely sinus bradycardia from being on metoprolol.  As mentioned above she has no any syncope or presyncope so for now we will continue metoprolol as prescribed.  Orders:  •  POCT ECG  •  CT head wo contrast; Future

## 2025-05-08 NOTE — ASSESSMENT & PLAN NOTE
Patient, no pain in the right ear.  On exam right ear is clear no wax, no redness, no discharge.  Tympanic membrane clear.  Has some discomfort in the TMJ area possible TM joint problem versus coming from tooth.  Advised to see dentist.  No signs of infection in the ear.

## 2025-05-08 NOTE — PROGRESS NOTES
Name: Jayna Kiran      : 1925      MRN: [id number]  Encounter Provider: Clara Mcnally MD  Encounter Date: 2025   Encounter department: [facility] INTERNAL MEDICINE  :  Assessment & Plan  Medicare annual wellness visit, subsequent         Dizziness    Orders:  •  POCT ECG  •  CT head wo contrast; Future    Essential hypertension    Orders:  •  metoprolol tartrate (LOPRESSOR) 50 mg tablet; Take 1 tablet (50 mg total) by mouth 2 (two) times a day  •  hydroCHLOROthiazide 25 mg tablet; Take 1 tablet (25 mg total) by mouth daily  •  CT head wo contrast; Future    Other specified hypothyroidism    Orders:  •  levothyroxine 25 mcg tablet; Take 1 tablet (25 mcg total) by mouth see administration instructions Take 1 tablet alternating with 2 tablets daily.    Stage 3b chronic kidney disease (HCC)  Lab Results   Component Value Date    EGFR 37 2025    EGFR 40 2024    EGFR 45 2024    CREATININE 1.21 2025    CREATININE 1.12 2024    CREATININE 1.03 2024            Macular degeneration of both eyes, unspecified type         Other constipation    Orders:  •  docusate sodium (COLACE) 100 mg capsule; Take 1 capsule (100 mg total) by mouth 2 (two) times a day    Malignant neoplasm of nipple of left breast in female, unspecified estrogen receptor status (HCC)         Right ear pain         Balance problem    Orders:  •  CT head wo contrast; Future    Flank pain    Orders:  •  CT abdomen pelvis wo contrast; Future       Preventive health issues were discussed with patient, and age appropriate screening tests were ordered as noted in patient's After Visit Summary. Personalized health advice and appropriate referrals for health education or preventive services given if needed, as noted in patient's After Visit Summary.    History of Present Illness     HPI   Patient Care Team:  Clara Mcnally MD as PCP - General (Internal Medicine)    Review  of Systems  Medical History Reviewed by provider this encounter:  Tobacco  Allergies  Meds  Problems  Med Hx  Surg Hx  Fam Hx       Annual Wellness Visit Questionnaire   Jayna is here for her Subsequent Wellness visit. Last Medicare Wellness visit information reviewed, patient interviewed and updates made to the record today.      Health Risk Assessment:   Patient rates overall health as good. Patient feels that their physical health rating is same. Patient is satisfied with their life. Eyesight was rated as slightly worse. Hearing was rated as slightly worse. Patient feels that their emotional and mental health rating is same. Patients states they are never, rarely angry. Patient states they are often unusually tired/fatigued. Pain experienced in the last 7 days has been some. Patient's pain rating has been 3/10. Patient states that she has experienced no weight loss or gain in last 6 months.     Depression Screening:   PHQ-2 Score: 2      Fall Risk Screening:   In the past year, patient has experienced: no history of falling in past year      Urinary Incontinence Screening:   Patient has leaked urine accidently in the last six months.     Home Safety:  Patient has trouble with stairs inside or outside of their home. Patient has working smoke alarms and has working carbon monoxide detector. Home safety hazards include: none.     Nutrition:   Current diet is Regular.     Medications:   Patient is currently taking over-the-counter supplements. OTC medications include: see medication list. Patient is able to manage medications.     Activities of Daily Living (ADLs)/Instrumental Activities of Daily Living (IADLs):   Walk and transfer into and out of bed and chair?: Yes  Dress and groom yourself?: Yes    Bathe or shower yourself?: Yes    Feed yourself? Yes  Do your laundry/housekeeping?: Yes  Manage your money, pay your bills and track your expenses?: No  Make your own meals?: Yes    Do your own shopping?:  No    Previous Hospitalizations:   Any hospitalizations or ED visits within the last 12 months?: No      Advance Care Planning:   Living will: Yes    Durable POA for healthcare: Yes    Advanced directive: Yes      Preventive Screenings        Diabetes Screening:     General: Screening Current      Colorectal Cancer Screening:     General: Screening Not Indicated      Breast Cancer Screening:     General: History Breast Cancer      Cervical Cancer Screening:    General: Screening Not Indicated      Osteoporosis Screening:    General: Screening Not Indicated and History Osteoporosis      Lung Cancer Screening:     General: Screening Not Indicated    Immunizations:  - Immunizations due: Prevnar 20 and Zoster (Shingrix)    Screening, Brief Intervention, and Referral to Treatment (SBIRT)     Screening  Typical number of drinks in a day: 0  Typical number of drinks in a week: 0  Interpretation: Low risk drinking behavior.    AUDIT-C Screenin) How often did you have a drink containing alcohol in the past year? never  2) How many drinks did you have on a typical day when you were drinking in the past year? 0  3) How often did you have 6 or more drinks on one occasion in the past year? never    AUDIT-C Score: 0  Interpretation: Score 0-2 (female): Negative screen for alcohol misuse    Single Item Drug Screening:  How often have you used an illegal drug (including marijuana) or a prescription medication for non-medical reasons in the past year? never    Single Item Drug Screen Score: 0  Interpretation: Negative screen for possible drug use disorder    Social Drivers of Health     Financial Resource Strain: Low Risk  (2024)    Overall Financial Resource Strain (CARDIA)    • Difficulty of Paying Living Expenses: Not hard at all   Food Insecurity: No Food Insecurity (5/3/2025)    Hunger Vital Sign    • Worried About Running Out of Food in the Last Year: Never true    • Ran Out of Food in the Last Year: Never true    Transportation Needs: No Transportation Needs (5/3/2025)    PRAPARE - Transportation    • Lack of Transportation (Medical): No    • Lack of Transportation (Non-Medical): No   Housing Stability: Low Risk  (5/3/2025)    Housing Stability Vital Sign    • Unable to Pay for Housing in the Last Year: No    • Number of Times Moved in the Last Year: 0    • Homeless in the Last Year: No   Utilities: Not At Risk (5/3/2025)    [facility] Utilities    • Threatened with loss of utilities: No     No results found.    Objective   /90 (BP Location: Right arm, Patient Position: Sitting, Cuff Size: Large)   Pulse 59   Temp 98 °F (36.7 °C) (Temporal)   Resp 18   SpO2 98%     Physical Exam

## 2025-05-08 NOTE — ASSESSMENT & PLAN NOTE
Patient, no right flank pain for last couple weeks.  Will get a CT scan to rule out any renal calculi versus other etiology of her pain.  Denies any trauma.  Orders:  •  CT abdomen pelvis wo contrast; Future

## 2025-05-08 NOTE — ASSESSMENT & PLAN NOTE
Orders:  •  levothyroxine 25 mcg tablet; Take 1 tablet (25 mcg total) by mouth see administration instructions Take 1 tablet alternating with 2 tablets daily.

## 2025-05-08 NOTE — ASSESSMENT & PLAN NOTE
Orders:  •  metoprolol tartrate (LOPRESSOR) 50 mg tablet; Take 1 tablet (50 mg total) by mouth 2 (two) times a day  •  hydroCHLOROthiazide 25 mg tablet; Take 1 tablet (25 mg total) by mouth daily  •  CT head wo contrast; Future

## 2025-05-08 NOTE — ASSESSMENT & PLAN NOTE
Blood pressure elevated.  Discussed with the patient and patient's daughter Devi was present in the office about adding another medication to control blood pressure but at present they decided to monitor blood pressure at home and will call next week with blood pressure readings if persistently elevated will consider adding medication.  For now continue metoprolol HCTZ and low-salt diet.  Orders:  •  metoprolol tartrate (LOPRESSOR) 50 mg tablet; Take 1 tablet (50 mg total) by mouth 2 (two) times a day  •  hydroCHLOROthiazide 25 mg tablet; Take 1 tablet (25 mg total) by mouth daily  •  CT head wo contrast; Future

## 2025-05-08 NOTE — TELEPHONE ENCOUNTER
Regarding: significant findings  ----- Message from Kylie CUEVAS sent at 5/8/2025  3:23 PM EDT -----  Jewels from Radiology called and there is significant findidng on the patient CT of the abdomen/pelvis today. Warm transfered was unsuccessful

## 2025-05-08 NOTE — ASSESSMENT & PLAN NOTE
Her TSH is therapeutic advised to continue same dose of levothyroxine.  Orders:  •  levothyroxine 25 mcg tablet; Take 1 tablet (25 mcg total) by mouth see administration instructions Take 1 tablet alternating with 2 tablets daily.

## 2025-05-08 NOTE — ASSESSMENT & PLAN NOTE
Complaint of constipation.  Will start docusate 1 tablet twice a day.  Patient states she tried the MiraLAX but was not effective.  Advised to maintain hydration.  Orders:  •  docusate sodium (COLACE) 100 mg capsule; Take 1 capsule (100 mg total) by mouth 2 (two) times a day

## 2025-05-08 NOTE — PROGRESS NOTES
Name: Jayna Kiran      : 1925      MRN: [id number]  Encounter Provider: Clara Mcnally MD  Encounter Date: 2025   Encounter department: [facility] INTERNAL MEDICINE  :  Assessment & Plan  Medicare annual wellness visit, subsequent         Dizziness  Patient, no dizziness like off balance for last 3 days.  Denies any headache or any focal weakness is present.  Denies any syncope or presyncope.  Will order CT of the brain to rule out any intracranial insult versus positional.  Advised to avoid sudden head movement for sudden turns.  Advised to use cane for safety to prevent fall.  EKG was done in the office revealed a normal sinus rhythm heart rate 55/min, left ventricular hypertrophy with QRS widening unchanged from prior EKG except has a sinus bradycardia.  Most likely sinus bradycardia from being on metoprolol.  As mentioned above she has no any syncope or presyncope so for now we will continue metoprolol as prescribed.  Orders:  •  POCT ECG  •  CT head wo contrast; Future    Essential hypertension  Blood pressure elevated.  Discussed with the patient and patient's daughter Devi was present in the office about adding another medication to control blood pressure but at present they decided to monitor blood pressure at home and will call next week with blood pressure readings if persistently elevated will consider adding medication.  For now continue metoprolol HCTZ and low-salt diet.  Orders:  •  metoprolol tartrate (LOPRESSOR) 50 mg tablet; Take 1 tablet (50 mg total) by mouth 2 (two) times a day  •  hydroCHLOROthiazide 25 mg tablet; Take 1 tablet (25 mg total) by mouth daily  •  CT head wo contrast; Future    Other specified hypothyroidism  Her TSH is therapeutic advised to continue same dose of levothyroxine.  Orders:  •  levothyroxine 25 mcg tablet; Take 1 tablet (25 mcg total) by mouth see administration instructions Take 1 tablet alternating with 2 tablets  daily.    Stage 3b chronic kidney disease (HCC)  Lab Results   Component Value Date    EGFR 37 05/02/2025    EGFR 40 08/31/2024    EGFR 45 05/04/2024    CREATININE 1.21 05/02/2025    CREATININE 1.12 08/31/2024    CREATININE 1.03 05/04/2024   Her BUN is elevated with a normal creatinine might have some prerenal component.  Advised to maintain hydration.       Macular degeneration of both eyes, unspecified type  Has been seen and followed by an ophthalmologist.  Patient does not drive anymore.       Other constipation  Complaint of constipation.  Will start docusate 1 tablet twice a day.  Patient states she tried the MiraLAX but was not effective.  Advised to maintain hydration.  Orders:  •  docusate sodium (COLACE) 100 mg capsule; Take 1 capsule (100 mg total) by mouth 2 (two) times a day    Malignant neoplasm of nipple of left breast in female, unspecified estrogen receptor status (HCC)  Patient does not want any more mammogram.       Right ear pain  Patient, no pain in the right ear.  On exam right ear is clear no wax, no redness, no discharge.  Tympanic membrane clear.  Has some discomfort in the TMJ area possible TM joint problem versus coming from tooth.  Advised to see dentist.  No signs of infection in the ear.       Balance problem  Patient is doing dizziness like balance problem for last 3 days as mentioned above EKG was done we will get a stat CT of the brain to rule out any intracranial insult versus positional see as above.  Discussed with the patient and patient's daughter about getting physical therapy evaluation and recommendations regarding balance but at present they would like to hold on.  Advised to use cane for safety.  Orders:  •  CT head wo contrast; Future    Flank pain  Patient, no right flank pain for last couple weeks.  Will get a CT scan to rule out any renal calculi versus other etiology of her pain.  Denies any trauma.  Orders:  •  CT abdomen pelvis wo contrast; Future       Preventive  health issues were discussed with patient, and age appropriate screening tests were ordered as noted in patient's After Visit Summary. Personalized health advice and appropriate referrals for health education or preventive services given if needed, as noted in patient's After Visit Summary.    History of Present Illness     89-year-old female patient here with her daughter Devi for Medicare annual wellness exam as well as follow-up of her medical problems.  Patient complain of dizziness like off balance for last 3 days.  Also complain of right ear pain.      Current Outpatient Medications:   •  Ascorbic Acid (Vitamin C) 500 MG CAPS, Take by mouth, Disp: , Rfl:   •  aspirin 81 mg chewable tablet, Chew 81 mg daily, Disp: , Rfl:   •  calcium carbonate (OS-HUMBERTO) 600 MG tablet, Take 600 mg by mouth 2 (two) times a day with meals 500mg BID, Disp: , Rfl:   •  docusate sodium (COLACE) 100 mg capsule, Take 1 capsule (100 mg total) by mouth 2 (two) times a day, Disp: 60 capsule, Rfl: 5  •  hydroCHLOROthiazide 25 mg tablet, Take 1 tablet (25 mg total) by mouth daily, Disp: 90 tablet, Rfl: 1  •  ibuprofen (MOTRIN) 200 mg tablet, Take 400 mg by mouth if needed for mild pain, Disp: , Rfl:   •  levothyroxine 25 mcg tablet, Take 1 tablet (25 mcg total) by mouth see administration instructions Take 1 tablet alternating with 2 tablets daily., Disp: 135 tablet, Rfl: 2  •  metoprolol tartrate (LOPRESSOR) 50 mg tablet, Take 1 tablet (50 mg total) by mouth 2 (two) times a day, Disp: 180 tablet, Rfl: 2  •  Multiple Vitamins-Minerals (multivitamin with minerals) tablet, Take 1 tablet by mouth daily, Disp: , Rfl:   •  omega-3-acid ethyl esters (LOVAZA) 1 g capsule, Take 2 g by mouth 2 (two) times a day, Disp: , Rfl:      Past Medical History:   Diagnosis Date   • Abdominal pain, LLQ 10/22/2021   • Acute rhinitis 04/22/2022   • Age related osteoporosis 01/22/2021   • Arthritis    • Balance problem 05/08/2025   • BMI 28.0-28.9,adult  01/22/2021   • BMI 29.0-29.9,adult 07/22/2022   • Cancer (HCC)    • Chest pain 07/22/2022   • CKD (chronic kidney disease)    • Colonoscopy refused    • Diverticulitis of colon    • Dizziness    • Dizziness 05/08/2025   • DJD (degenerative joint disease)    • Edema of both lower legs 01/22/2021   • Encounter for support and coordination of transition of care 12/05/2022   • Essential hypertension 01/22/2021   • Flank pain 05/08/2025   • GE reflux    • General weakness    • Heart murmur 10/21/2022   • Heart murmur, aortic    • Heart murmur, aortic 10/22/2021   • Hematoma of scalp 09/15/2021   • History of breast cancer    • Hypercalcemia    • Hyperkalemia 01/22/2021   • Hypertension    • Hypothyroidism 07/23/2021   • Laceration of right thumb 09/15/2021   • Left shoulder pain 07/22/2022   • Left shoulder pain 08/12/2022   • Leukocytosis 01/21/2022   • Macular degeneration    • Medicare annual wellness visit, subsequent 01/20/2023   • Medicare annual wellness visit, subsequent 01/30/2024   • Medicare annual wellness visit, subsequent 05/08/2025   • Muscular deconditioning 12/05/2022   • Nausea 12/29/2021   • Neck pain 07/22/2022   • Night sweat 05/11/2023   • Nodule of left lung 07/22/2022   • Numbness and tingling 07/23/2021   • Osteoporosis    • Other chest pain 07/22/2022   • Other constipation 09/19/2024   • Other fatigue 05/11/2023   • Over weight 10/21/2022   • Overweight    • Pneumonia due to infectious organism 12/05/2022   • Primary osteoarthritis 01/22/2021   • Right ear pain 05/08/2025   • Shortness of breath 01/02/2025   • Sinobronchitis 11/01/2022   • Sinus headache 12/29/2021   • Skin lesion    • SOB (shortness of breath) 12/29/2021   • SOB (shortness of breath) on exertion 01/20/2023   • Stasis edema of both lower extremities    • URI (upper respiratory infection) 04/22/2022   • Urinary frequency 05/11/2023        Back Pain  Pertinent negatives include no abdominal pain, chest pain, dysuria, fever,  headaches or weakness.      Patient Care Team:  Clara Mcnally MD as PCP - General (Internal Medicine)    Review of Systems   Constitutional:  Negative for chills and fever.   HENT:  Positive for ear pain (Right ear pain). Negative for congestion, hearing loss, nosebleeds, sinus pain, sore throat and trouble swallowing.    Eyes:  Negative for discharge, redness and visual disturbance.   Respiratory:  Negative for cough, chest tightness and shortness of breath.    Cardiovascular:  Negative for chest pain and palpitations.   Gastrointestinal:  Negative for abdominal pain, blood in stool, constipation, diarrhea, nausea and vomiting.   Genitourinary:  Positive for flank pain (Right flank pain). Negative for dysuria, frequency and hematuria.   Musculoskeletal:  Negative for arthralgias, back pain, myalgias and neck pain.   Skin:  Negative for rash.   Neurological:  Positive for dizziness. Negative for syncope, facial asymmetry, speech difficulty, weakness, light-headedness and headaches.   Hematological:  Does not bruise/bleed easily.   Psychiatric/Behavioral:  Negative for agitation and behavioral problems.      Medical History Reviewed by provider this encounter:  Tobacco  Allergies  Meds  Problems  Med Hx  Surg Hx  Fam Hx       Annual Wellness Visit Questionnaire   Jayna is here for her Subsequent Wellness visit. Last Medicare Wellness visit information reviewed, patient interviewed and updates made to the record today.      Health Risk Assessment:   Patient rates overall health as good. Patient feels that their physical health rating is same. Patient is satisfied with their life. Eyesight was rated as slightly worse. Hearing was rated as slightly worse. Patient feels that their emotional and mental health rating is same. Patients states they are never, rarely angry. Patient states they are often unusually tired/fatigued. Pain experienced in the last 7 days has been some. Patient's pain rating  has been 3/10. Patient states that she has experienced no weight loss or gain in last 6 months.     Depression Screening:   PHQ-2 Score: 2      Fall Risk Screening:   In the past year, patient has experienced: no history of falling in past year      Urinary Incontinence Screening:   Patient has leaked urine accidently in the last six months.     Home Safety:  Patient has trouble with stairs inside or outside of their home. Patient has working smoke alarms and has working carbon monoxide detector. Home safety hazards include: none.     Nutrition:   Current diet is Regular.     Medications:   Patient is currently taking over-the-counter supplements. OTC medications include: see medication list. Patient is able to manage medications.     Activities of Daily Living (ADLs)/Instrumental Activities of Daily Living (IADLs):   Walk and transfer into and out of bed and chair?: Yes  Dress and groom yourself?: Yes    Bathe or shower yourself?: Yes    Feed yourself? Yes  Do your laundry/housekeeping?: Yes  Manage your money, pay your bills and track your expenses?: No  Make your own meals?: Yes    Do your own shopping?: No    Previous Hospitalizations:   Any hospitalizations or ED visits within the last 12 months?: No      Advance Care Planning:   Living will: Yes    Durable POA for healthcare: Yes    Advanced directive: Yes    Advanced directive counseling given: Yes    Patient declined ACP directive: No      Cognitive Screening:   Provider or family/friend/caregiver concerned regarding cognition?: No    Preventive Screenings      Cardiovascular Screening:    General: Screening Current      Diabetes Screening:     General: Screening Current      Colorectal Cancer Screening:     General: Screening Not Indicated      Breast Cancer Screening:     General: History Breast Cancer and Patient Declines      Cervical Cancer Screening:    General: Screening Not Indicated      Osteoporosis Screening:    General: History Osteoporosis and  Patient Declines      Abdominal Aortic Aneurysm (AAA) Screening:        General: Screening Not Indicated      Lung Cancer Screening:     General: Screening Not Indicated    Immunizations:  - Immunizations due: Prevnar 20 and Zoster (Shingrix)    Screening, Brief Intervention, and Referral to Treatment (SBIRT)     Screening  Typical number of drinks in a day: 0  Typical number of drinks in a week: 0  Interpretation: Low risk drinking behavior.    AUDIT-C Screenin) How often did you have a drink containing alcohol in the past year? never  2) How many drinks did you have on a typical day when you were drinking in the past year? 0  3) How often did you have 6 or more drinks on one occasion in the past year? never    AUDIT-C Score: 0  Interpretation: Score 0-2 (female): Negative screen for alcohol misuse    Single Item Drug Screening:  How often have you used an illegal drug (including marijuana) or a prescription medication for non-medical reasons in the past year? never    Single Item Drug Screen Score: 0  Interpretation: Negative screen for possible drug use disorder    Brief Intervention  Alcohol & drug use screenings were reviewed. No concerns regarding substance use disorder identified.     Other Counseling Topics:   Car/seat belt/driving safety, skin self-exam and sunscreen.     Social Drivers of Health     Financial Resource Strain: Low Risk  (2024)    Overall Financial Resource Strain (CARDIA)    • Difficulty of Paying Living Expenses: Not hard at all   Food Insecurity: No Food Insecurity (5/3/2025)    Hunger Vital Sign    • Worried About Running Out of Food in the Last Year: Never true    • Ran Out of Food in the Last Year: Never true   Transportation Needs: No Transportation Needs (5/3/2025)    PRAPARE - Transportation    • Lack of Transportation (Medical): No    • Lack of Transportation (Non-Medical): No   Housing Stability: Low Risk  (5/3/2025)    Housing Stability Vital Sign    • Unable to Pay for  Housing in the Last Year: No    • Number of Times Moved in the Last Year: 0    • Homeless in the Last Year: No   Utilities: Not At Risk (5/3/2025)    [facility] Utilities    • Threatened with loss of utilities: No     No results found.    Objective   /90 (BP Location: Right arm, Patient Position: Sitting, Cuff Size: Large)   Pulse 59   Temp 98 °F (36.7 °C) (Temporal)   Resp 18   SpO2 98%     Physical Exam  Vitals and nursing note reviewed.   Constitutional:       General: She is not in acute distress.     Appearance: She is well-developed.   HENT:      Head: Normocephalic and atraumatic.      Right Ear: Tympanic membrane, ear canal and external ear normal. There is no impacted cerumen.      Left Ear: Tympanic membrane, ear canal and external ear normal. There is no impacted cerumen.      Nose: Nose normal.      Mouth/Throat:      Mouth: Mucous membranes are moist.      Comments: She has a tenderness on the TM joint area on the right side.  Eyes:      General: No scleral icterus.        Right eye: No discharge.         Left eye: No discharge.      Extraocular Movements: Extraocular movements intact.      Conjunctiva/sclera: Conjunctivae normal.   Cardiovascular:      Rate and Rhythm: Normal rate and regular rhythm.      Pulses: Normal pulses.   Pulmonary:      Effort: Pulmonary effort is normal. No respiratory distress.      Breath sounds: Normal breath sounds. No wheezing.   Abdominal:      General: Bowel sounds are normal.      Palpations: Abdomen is soft.      Tenderness: There is no abdominal tenderness. There is right CVA tenderness (Right flank tenderness).   Musculoskeletal:         General: No swelling. Normal range of motion.      Cervical back: Normal range of motion and neck supple.      Right lower leg: Edema (Trace ankle edema) present.      Left lower leg: Edema (Trace ankle edema) present.   Skin:     General: Skin is warm and dry.      Findings: No rash.   Neurological:      General: No focal  deficit present.      Mental Status: She is alert and oriented to person, place, and time.   Psychiatric:         Mood and Affect: Mood normal.         Behavior: Behavior normal.

## 2025-05-08 NOTE — ASSESSMENT & PLAN NOTE
Lab Results   Component Value Date    EGFR 37 05/02/2025    EGFR 40 08/31/2024    EGFR 45 05/04/2024    CREATININE 1.21 05/02/2025    CREATININE 1.12 08/31/2024    CREATININE 1.03 05/04/2024

## 2025-05-08 NOTE — ASSESSMENT & PLAN NOTE
Lab Results   Component Value Date    EGFR 37 05/02/2025    EGFR 40 08/31/2024    EGFR 45 05/04/2024    CREATININE 1.21 05/02/2025    CREATININE 1.12 08/31/2024    CREATININE 1.03 05/04/2024   Her BUN is elevated with a normal creatinine might have some prerenal component.  Advised to maintain hydration.

## 2025-06-07 PROBLEM — Z00.00 MEDICARE ANNUAL WELLNESS VISIT, SUBSEQUENT: Status: RESOLVED | Noted: 2025-05-08 | Resolved: 2025-06-07

## 2025-06-12 DIAGNOSIS — K59.09 OTHER CONSTIPATION: ICD-10-CM

## 2025-06-12 RX ORDER — DOCUSATE SODIUM 100 MG/1
100 CAPSULE, LIQUID FILLED ORAL 2 TIMES DAILY
Qty: 60 CAPSULE | Refills: 5 | OUTPATIENT
Start: 2025-06-12

## 2025-06-12 NOTE — TELEPHONE ENCOUNTER
Patients daughter called because she is out of the docusate sodium (COLACE) 100 mg capsule and needs a refill sent to the pharmacy on file. Please advise. Thank you.